# Patient Record
Sex: FEMALE | Race: WHITE | NOT HISPANIC OR LATINO | Employment: OTHER | URBAN - NONMETROPOLITAN AREA
[De-identification: names, ages, dates, MRNs, and addresses within clinical notes are randomized per-mention and may not be internally consistent; named-entity substitution may affect disease eponyms.]

---

## 2017-08-01 ENCOUNTER — OFFICE VISIT (OUTPATIENT)
Dept: FAMILY MEDICINE | Facility: OTHER | Age: 62
End: 2017-08-01
Attending: NURSE PRACTITIONER
Payer: COMMERCIAL

## 2017-08-01 VITALS
HEART RATE: 84 BPM | TEMPERATURE: 98.3 F | OXYGEN SATURATION: 99 % | SYSTOLIC BLOOD PRESSURE: 120 MMHG | WEIGHT: 128 LBS | HEIGHT: 65 IN | RESPIRATION RATE: 18 BRPM | DIASTOLIC BLOOD PRESSURE: 70 MMHG | BODY MASS INDEX: 21.33 KG/M2

## 2017-08-01 DIAGNOSIS — Z76.89 ENCOUNTER TO ESTABLISH CARE: Primary | ICD-10-CM

## 2017-08-01 PROCEDURE — 99213 OFFICE O/P EST LOW 20 MIN: CPT | Performed by: NURSE PRACTITIONER

## 2017-08-01 RX ORDER — VALACYCLOVIR HYDROCHLORIDE 500 MG/1
500 TABLET, FILM COATED ORAL DAILY
Refills: 0 | COMMUNITY
Start: 2017-02-13 | End: 2017-10-10 | Stop reason: SINTOL

## 2017-08-01 ASSESSMENT — ANXIETY QUESTIONNAIRES
1. FEELING NERVOUS, ANXIOUS, OR ON EDGE: NOT AT ALL
2. NOT BEING ABLE TO STOP OR CONTROL WORRYING: NOT AT ALL
6. BECOMING EASILY ANNOYED OR IRRITABLE: NOT AT ALL
7. FEELING AFRAID AS IF SOMETHING AWFUL MIGHT HAPPEN: NOT AT ALL
3. WORRYING TOO MUCH ABOUT DIFFERENT THINGS: NOT AT ALL
5. BEING SO RESTLESS THAT IT IS HARD TO SIT STILL: NOT AT ALL
GAD7 TOTAL SCORE: 0
IF YOU CHECKED OFF ANY PROBLEMS ON THIS QUESTIONNAIRE, HOW DIFFICULT HAVE THESE PROBLEMS MADE IT FOR YOU TO DO YOUR WORK, TAKE CARE OF THINGS AT HOME, OR GET ALONG WITH OTHER PEOPLE: NOT DIFFICULT AT ALL
4. TROUBLE RELAXING: NOT AT ALL

## 2017-08-01 ASSESSMENT — PAIN SCALES - GENERAL: PAINLEVEL: NO PAIN (0)

## 2017-08-01 NOTE — PATIENT INSTRUCTIONS
Psychologists/ counselors  [facility]  [phone]  Dr. Ermias Pete [phone] - for testing of ADD diagnosis  [facility]  [phone]  [facility] [phone]  Terry Leiva  [phone]   [facility]  [phone]  (kids)  [facility] [phone]  (teens)  [facility]      [phone]  [facility] Mental [facility] [phone]    [facility]     [phone]   [facility] counseling [phone]  [facility]  [phone]  Regan Mcmahon [phone]  Divina Caceres [phone]  [facility]     [phone]  [facility] adult family     [phone]  [facility] Psych[facility] Well[facility]     [phone]  [facility]     [phone]  [place]  Anand Lazaro  [phone]  [facility]     [phone]  [facility] Dr. ZORAIDA Wells     [phone]  [facility] Services     [phone]

## 2017-08-01 NOTE — PROGRESS NOTES
SUBJECTIVE:                                                    Anne Marie Nix is a 62 year old female who presents to clinic today for the following health issues:      New Patient/Transfer of Care  History of ADD      Duration: life long dx in 1996 was treated by Dr Prakash in [place] - who has now passed away    Description (location/character/radiation): difficulty focusing    Intensity:  moderate    Accompanying signs and symptoms: difficulty focusing    History (similar episodes/previous evaluation): life lone    Precipitating or alleviating factors: going through a divorce at this time    Therapies tried and outcome: Ritalin in the past - waiting on getting paperwork or a new Diagnosis for counselor    Anne Marie does not see a counselor medication only from             Problem list and histories reviewed & adjusted, as indicated.  Additional history: as documented    Patient Active Problem List   Diagnosis     Multiple respiratory allergies     ACP (advance care planning)     History reviewed. No pertinent surgical history.    Social History   Substance Use Topics     Smoking status: Never Smoker     Smokeless tobacco: Never Used     Alcohol use No     Family History   Problem Relation Age of Onset     Allergies Mother      Breast Cancer Mother      DIABETES Mother      Cardiovascular Paternal Grandfather      Allergies Paternal Grandfather      Other - See Comments Father          Current Outpatient Prescriptions   Medication Sig Dispense Refill     valACYclovir (VALTREX) 500 MG tablet Take 500 mg by mouth daily  0     pseudoePHEDrine (SUDAFED) 30 MG tablet Take 2 tablets by mouth every 4 hours as needed.       Allergies   Allergen Reactions     Amoxicillin Trihydrate Other (See Comments)     Headache  Stomach aches  Augmentin         Clavulanic Acid Potassium Other (See Comments)     Headaches  Stomach aches  Augmentin           Reviewed and updated as needed this visit by clinical staffTobacco   "Allergies  Meds  Med Hx  Surg Hx  Fam Hx  Soc Hx      Reviewed and updated as needed this visit by Provider  Allergies  Meds         ROS:  C: NEGATIVE for fever, chills, change in weight  I: NEGATIVE for worrisome rashes, moles or lesions  E: NEGATIVE for vision changes or irritation  E/M: NEGATIVE for ear, mouth and throat problems POSITIVE: sinus allergies   R: NEGATIVE for significant cough or SOB  CV: NEGATIVE for chest pain, palpitations or peripheral edema  GI: NEGATIVE for nausea, abdominal pain, heartburn, or change in bowel habits  : NEGATIVE for frequency, dysuria, or hematuria  M: NEGATIVE for significant arthralgias or myalgia  N: NEGATIVE for weakness, dizziness or paresthesias  E: NEGATIVE for temperature intolerance, skin/hair changes  H: NEGATIVE for bleeding problems  PSYCHIATRIC: hx of ADD - difficulty focusing - denies depression     OBJECTIVE:     /70 (BP Location: Right arm, Patient Position: Sitting, Cuff Size: Adult Regular)  Pulse 84  Temp 98.3  F (36.8  C) (Tympanic)  Resp 18  Ht 5' 5\" (1.651 m)  Wt 128 lb (58.1 kg)  SpO2 99%  BMI 21.3 kg/m2  Body mass index is 21.3 kg/(m^2).   GENERAL: healthy, alert and no distress  RESP: non-labored  CV: regular rate  PSYCH: mentation appears normal, affect normal/bright    Diagnostic Test Results:  none     ASSESSMENT:       PLAN:   ASSESSMENT / PLAN:  (Z76.89) Encounter to establish care  (primary encounter diagnosis)  Comment:   Plan:  Should have diagnostic testing for ADD   Provided a list of counselors       Should schedule a physical exam        DEEPA [facility] [place]    "

## 2017-08-01 NOTE — MR AVS SNAPSHOT
After Visit Summary   8/1/2017    Anne Marie Nix    MRN: [id number]           Patient Information     Date Of Birth          1955        Visit Information        Provider Department      8/1/2017 9:40 AM Kim Wheat APRN CNP [facility]        Care Instructions    Psychologists/ counselors  [facility]  [phone]  Dr. Ermias Pete [phone] - for testing of ADD diagnosis  [facility]  [phone]  [facility] [phone]  Terry Leiva  [phone]   [email or web address]  [phone]  (kids)  [email or web address] [phone]  (teens)  [facility] Behavioral Health      [phone]  [facility] Health [phone]    [facility]     [phone]   [facility] counseling [phone]  [facility]  [phone]  Regan Mcmahon [phone]  Divina Caceres [phone]  [facility]     [phone]  [facility] behavioral/ adult family     [phone]  [facility] Psych[facility] Health & Wellness     [phone]  [facility]     [phone]  [place]ie Lazaro  [phone]  [facility]     [phone]  [facility] Dr. ZORAIDA Wells     [phone]  [facility]     [phone]                          Follow-ups after your visit        Who to contact     If you have questions or need follow up information about today's clinic visit or your schedule please contact [facility] directly at [phone].  Normal or non-critical lab and imaging results will be communicated to you by MyChart, letter or phone within 4 business days after the clinic has received the results. If you do not hear from us within 7 days, please contact the clinic through MyChart or phone. If you have a critical or abnormal lab result, we will notify you by phone as soon as possible.  Submit refill requests through [employer] or call your pharmacy and  "they will forward the refill request to us. Please allow 3 business days for your refill to be completed.          Additional Information About Your Visit        NLP Logixhart Information     Proximic lets you send messages to your doctor, view your test results, renew your prescriptions, schedule appointments and more. To sign up, go to [email or web address] . Click on \"Log in\" on the left side of the screen, which will take you to the Welcome page. Then click on \"Sign up Now\" on the right side of the page.     You will be asked to enter the access code listed below, as well as some personal information. Please follow the directions to create your username and password.     Your access code is: [email or web address]  Expires: 10/30/2017 11:02 AM     Your access code will  in 90 days. If you need help or a new code, please call your [place] clinic or [phone].        Care EveryWhere ID     This is your [facility] EveryWhere ID. This could be used by other organizations to access your [place] medical records  [id number]        Your Vitals Were     Pulse Temperature Respirations Height Pulse Oximetry BMI (Body Mass Index)    84 98.3  F (36.8  C) (Tympanic) 18 5' 5\" (1.651 m) 99% 21.3 kg/m2       Blood Pressure from Last 3 Encounters:   17 120/70   16 109/72   13 116/76    Weight from Last 3 Encounters:   17 128 lb (58.1 kg)   16 124 lb (56.2 kg)   14 119 lb 6.4 oz (54.2 kg)              Today, you had the following     No orders found for display         Today's Medication Changes          These changes are accurate as of: 17 11:02 AM.  If you have any questions, ask your nurse or doctor.               Stop taking these medicines if you haven't already. Please contact your care team if you have questions.     clindamycin 1 % lotion   Commonly known as:  CLEOCIN T   Stopped by:  Kim Wheat APRN CNP                    Primary Care Provider    No Pcp Confirmed       No " address on file        Equal Access to Services     [facility] : Hadii aad gabbi nesha Lang, wadestinee lucezar, salvador gladisandresymone giraldo, waxjarrett john liebermansindyrolo damian. So [facility] [phone].    ATENCIÓN: Si habla español, tiene a mcconnell disposición servicios gratuitos de asistencia lingüística. Llame al [phone].    We comply with applicable federal civil rights laws and [place] laws. We do not discriminate on the basis of race, color, national origin, age, disability sex, sexual orientation or gender identity.            Thank you!     Thank you for choosing [facility]  for your care. Our goal is always to provide you with excellent care. Hearing back from our patients is one way we can continue to improve our services. Please take a few minutes to complete the written survey that you may receive in the mail after your visit with us. Thank you!             Your Updated Medication List - Protect others around you: Learn how to safely use, store and throw away your medicines at www.disposemymeds.org.          This list is accurate as of: 8/1/17 11:02 AM.  Always use your most recent med list.                   Brand Name Dispense Instructions for use Diagnosis    SUDAFED 30 MG tablet   Generic drug:  pseudoePHEDrine      Take 2 tablets by mouth every 4 hours as needed.        valACYclovir 500 MG tablet    VALTREX     Take 500 mg by mouth daily

## 2017-08-02 ASSESSMENT — ANXIETY QUESTIONNAIRES: GAD7 TOTAL SCORE: 0

## 2017-08-02 ASSESSMENT — PATIENT HEALTH QUESTIONNAIRE - PHQ9: SUM OF ALL RESPONSES TO PHQ QUESTIONS 1-9: 5

## 2017-08-23 ENCOUNTER — TRANSFERRED RECORDS (OUTPATIENT)
Dept: HEALTH INFORMATION MANAGEMENT | Facility: HOSPITAL | Age: 62
End: 2017-08-23

## 2017-09-12 ENCOUNTER — OFFICE VISIT (OUTPATIENT)
Dept: FAMILY MEDICINE | Facility: OTHER | Age: 62
End: 2017-09-12
Attending: NURSE PRACTITIONER
Payer: COMMERCIAL

## 2017-09-12 ENCOUNTER — TELEPHONE (OUTPATIENT)
Dept: FAMILY MEDICINE | Facility: OTHER | Age: 62
End: 2017-09-12

## 2017-09-12 VITALS
RESPIRATION RATE: 18 BRPM | OXYGEN SATURATION: 99 % | DIASTOLIC BLOOD PRESSURE: 70 MMHG | SYSTOLIC BLOOD PRESSURE: 128 MMHG | HEART RATE: 95 BPM | HEIGHT: 65 IN | BODY MASS INDEX: 21.33 KG/M2 | WEIGHT: 128 LBS | TEMPERATURE: 98.4 F

## 2017-09-12 DIAGNOSIS — F90.0 ATTENTION DEFICIT HYPERACTIVITY DISORDER (ADHD), PREDOMINANTLY INATTENTIVE TYPE: Primary | ICD-10-CM

## 2017-09-12 PROCEDURE — 99213 OFFICE O/P EST LOW 20 MIN: CPT | Performed by: NURSE PRACTITIONER

## 2017-09-12 RX ORDER — LISDEXAMFETAMINE DIMESYLATE 30 MG/1
30 CAPSULE ORAL EVERY MORNING
Qty: 30 CAPSULE | Refills: 0 | Status: SHIPPED | OUTPATIENT
Start: 2017-09-12 | End: 2017-09-19 | Stop reason: ALTCHOICE

## 2017-09-12 RX ORDER — VALACYCLOVIR HYDROCHLORIDE 500 MG/1
500 TABLET, FILM COATED ORAL DAILY
Qty: 14 TABLET | Refills: 0 | Status: CANCELLED | OUTPATIENT
Start: 2017-09-12

## 2017-09-12 ASSESSMENT — PAIN SCALES - GENERAL: PAINLEVEL: NO PAIN (0)

## 2017-09-12 NOTE — PROGRESS NOTES
SUBJECTIVE:   Anne Marie Nix is a 62 year old female who presents to clinic today for the following health issues:        ADHD      Duration: lifetime    Description (location/character/radiation): has received letter for ADHD recommendations, copies to chart    Intensity:  moderate    Accompanying signs and symptoms: hard to concentrate    History (similar episodes/previous evaluation): None    Precipitating or alleviating factors: None    Therapies tried and outcome: medications prior to this worked well.  Has been off medications for over 2 years Ritalin 10mg q4hr prn             Problem list and histories reviewed & adjusted, as indicated.  Additional history: as documented    Patient Active Problem List   Diagnosis     Multiple respiratory allergies     ACP (advance care planning)     Attention deficit hyperactivity disorder (ADHD), predominantly inattentive type     History reviewed. No pertinent surgical history.    Social History   Substance Use Topics     Smoking status: Never Smoker     Smokeless tobacco: Never Used     Alcohol use No     Family History   Problem Relation Age of Onset     Allergies Mother      Breast Cancer Mother      DIABETES Mother      Cardiovascular Paternal Grandfather      Allergies Paternal Grandfather      Other - See Comments Father          Current Outpatient Prescriptions   Medication Sig Dispense Refill     valACYclovir (VALTREX) 500 MG tablet Take 500 mg by mouth daily  0     pseudoePHEDrine (SUDAFED) 30 MG tablet Take 2 tablets by mouth every 4 hours as needed.       Allergies   Allergen Reactions     Amoxicillin Trihydrate Other (See Comments)     Headache  Stomach aches  Augmentin         Clavulanic Acid Potassium Other (See Comments)     Headaches  Stomach aches  Augmentin           Reviewed and updated as needed this visit by clinical staffTobacco  Allergies  Meds  Med Hx  Surg Hx  Fam Hx  Soc Hx      Reviewed and updated as needed this visit by Provider      "    ROS:  C: NEGATIVE for fever, chills, change in weight  E/M: NEGATIVE for ear, mouth and throat problems  R: NEGATIVE for significant cough or SOB  CV: NEGATIVE for chest pain, palpitations or peripheral edema  PSYCHIATRIC: Hx of ADHD    OBJECTIVE:     /70 (BP Location: Right arm, Patient Position: Sitting, Cuff Size: Adult Regular)  Pulse 95  Temp 98.4  F (36.9  C) (Tympanic)  Resp 18  Ht 5' 5\" (1.651 m)  Wt 128 lb (58.1 kg)  SpO2 99%  BMI 21.3 kg/m2  Body mass index is 21.3 kg/(m^2).   GENERAL: healthy, alert and no distress  RESP: non-labored breathing  CV: regular rate  PSYCH: mentation appears normal, anxious and appearance well groomed    Diagnostic Test Results:  none     ASSESSMENT:       PLAN:   ASSESSMENT / PLAN:  (F90.0) Attention deficit hyperactivity disorder (ADHD), predominantly inattentive type  (primary encounter diagnosis)  Comment:  Discussed plan of care with Anne Marie. She stated she used to be on Ritalin 10 every 4 hours.  Discussed starting her back on the Ritalin 10 mg today and following up in 3 weeks to increase if needed. She declined stating she has already scheduled an appointment with another provider in the [place] area where she lives. She states she does not have time to keep coming back and forth. Anne Marie was adamant that she was not drug seeking or would she abuse medication. She did not agree with the plan to start out slow. She stated she will follow up with another provider.  Copy of Luan Pete's assessment for ADHD to be scanned into the chart.    Anne Marie called back and would like to try Vyvanse for her ADHD. She understands she will need to follow up in 3 weeks. Will provide one month of medication and re-evaluate at 3 weeks for continued treatment or increase as needed per symptoms management for ADHD.    Anne Marie also wanted her valtrex reordered today, but when questioning her about her why she became defensive. Stating she has a history of cold sores. When going " "to re-order she stated \"do not worry about it my son-in-law is a dentist and he has been filling my valtrex and he can continue to fill it for me.\"          Kim Wheat, DEEPA [facility] [place]  "

## 2017-09-12 NOTE — MR AVS SNAPSHOT
"              After Visit Summary   2017    Anne Marie Nix    MRN: [id number]           Patient Information     Date Of Birth          1955        Visit Information        Provider Department      2017 11:40 AM Kim Wheat APRN CNP [facility]        Today's Diagnoses     Attention deficit hyperactivity disorder (ADHD), predominantly inattentive type    -  1       Follow-ups after your visit        Follow-up notes from your care team     Return in about 3 weeks (around 10/3/2017) for ADHD MED RECHECK (short).      Who to contact     If you have questions or need follow up information about today's clinic visit or your schedule please contact [facility] directly at [phone].  Normal or non-critical lab and imaging results will be communicated to you by Easy Home Solutionshart, letter or phone within 4 business days after the clinic has received the results. If you do not hear from us within 7 days, please contact the clinic through [employer] or phone. If you have a critical or abnormal lab result, we will notify you by phone as soon as possible.  Submit refill requests through BuyWithMe or call your pharmacy and they will forward the refill request to us. Please allow 3 business days for your refill to be completed.          Additional Information About Your Visit        Easy Home Solutionshart Information     BuyWithMe lets you send messages to your doctor, view your test results, renew your prescriptions, schedule appointments and more. To sign up, go to [email or web address] . Click on \"Log in\" on the left side of the screen, which will take you to the Welcome page. Then click on \"Sign up Now\" on the right side of the page.     You will be asked to enter the access code listed below, as well as some personal information. Please follow the directions to create your username and password.     Your access code is: [email or web address]  Expires: 10/30/2017 11:02 AM     Your access code will  in " "90 days. If you need help or a new code, please call your [place] clinic or [phone].        Care EveryWhere ID     This is your Care EveryWhere ID. This could be used by other organizations to access your [place] medical records  [id number]        Your Vitals Were     Pulse Temperature Respirations Height Pulse Oximetry BMI (Body Mass Index)    95 98.4  F (36.9  C) (Tympanic) 18 5' 5\" (1.651 m) 99% 21.3 kg/m2       Blood Pressure from Last 3 Encounters:   09/12/17 128/70   08/01/17 120/70   07/08/16 109/72    Weight from Last 3 Encounters:   09/12/17 128 lb (58.1 kg)   08/01/17 128 lb (58.1 kg)   07/08/16 124 lb (56.2 kg)              Today, you had the following     No orders found for display       Primary Care Provider Office Phone # Fax #    Kim DEEPA [facility] [phone]       [address] [facility] [address]        Equal Access to Services     [facility]: Hadii yan ku hadasho Soadina, waaxda luqadaha, qaybta kaalmada kristal, leah jackson . So [facility] [phone].    ATENCIÓN: Si habla español, tiene a mcconnell disposición servicios gratuitos de asistencia lingüística. Fede al [phone].    We comply with applicable federal civil rights laws and [place] laws. We do not discriminate on the basis of race, color, national origin, age, disability sex, sexual orientation or gender identity.            Thank you!     Thank you for choosing [facility]  for your care. Our goal is always to provide you with excellent care. Hearing back from our patients is one way we can continue to improve our services. Please take a few minutes to complete the written survey that you may receive in the mail after your visit with us. Thank you!             Your Updated Medication List - Protect others around you: Learn how to safely use, store and throw away your medicines at www.disposemymeds.org.          This list is accurate as of: 9/12/17 12:52 " PM.  Always use your most recent med list.                   Brand Name Dispense Instructions for use Diagnosis    SUDAFED 30 MG tablet   Generic drug:  pseudoePHEDrine      Take 2 tablets by mouth every 4 hours as needed.        valACYclovir 500 MG tablet    VALTREX     Take 500 mg by mouth daily

## 2017-09-12 NOTE — NURSING NOTE
"Chief Complaint   Patient presents with     RECHECK     post Psychological Assessment ADHD       Initial /70 (BP Location: Right arm, Patient Position: Sitting, Cuff Size: Adult Regular)  Pulse 95  Temp 98.4  F (36.9  C) (Tympanic)  Resp 18  Ht 5' 5\" (1.651 m)  Wt 128 lb (58.1 kg)  SpO2 99%  BMI 21.3 kg/m2 Estimated body mass index is 21.3 kg/(m^2) as calculated from the following:    Height as of this encounter: 5' 5\" (1.651 m).    Weight as of this encounter: 128 lb (58.1 kg).  Medication Reconciliation: complete   Nataliia Wilson      "

## 2017-09-12 NOTE — TELEPHONE ENCOUNTER
Order done.  Please notify she needs to follow up in 3 weeks or sooner if problems    Thank you  Kim ARENAS FNP-BC  Family Nurse Practitioner

## 2017-09-14 ENCOUNTER — TELEPHONE (OUTPATIENT)
Dept: FAMILY MEDICINE | Facility: OTHER | Age: 62
End: 2017-09-14

## 2017-09-18 ENCOUNTER — TELEPHONE (OUTPATIENT)
Dept: FAMILY MEDICINE | Facility: OTHER | Age: 62
End: 2017-09-18
Payer: COMMERCIAL

## 2017-09-18 NOTE — TELEPHONE ENCOUNTER
7:33 AM    Reason for Call: Phone Call    Description: Pt called and stated she had some adverse effects to the Vyvanse. Stated she had a lot of nausea and loss of appetite. She stopped taking med for last 2 days and nausea has improved. Please call her back at [phone]    Was an appointment offered for this call? Yes  If yes : Appointment type              Date    Preferred method for responding to this message: Telephone Call  What is your phone number ?    If we cannot reach you directly, may we leave a detailed response at the number you provided? Yes    Can this message wait until your PCP/provider returns, if available today? Not applicable    Aspen Barahona

## 2017-09-19 ENCOUNTER — TELEPHONE (OUTPATIENT)
Dept: FAMILY MEDICINE | Facility: OTHER | Age: 62
End: 2017-09-19

## 2017-09-19 DIAGNOSIS — F90.0 ATTENTION DEFICIT HYPERACTIVITY DISORDER (ADHD), PREDOMINANTLY INATTENTIVE TYPE: Primary | ICD-10-CM

## 2017-09-19 RX ORDER — METHYLPHENIDATE HYDROCHLORIDE 10 MG/1
10 TABLET ORAL 2 TIMES DAILY
Qty: 60 TABLET | Refills: 0 | Status: CANCELLED | OUTPATIENT
Start: 2017-09-19

## 2017-09-19 RX ORDER — METHYLPHENIDATE HYDROCHLORIDE 10 MG/1
10 TABLET ORAL 2 TIMES DAILY
Qty: 60 TABLET | Refills: 0 | Status: SHIPPED | OUTPATIENT
Start: 2017-09-19 | End: 2017-10-10

## 2017-09-19 NOTE — TELEPHONE ENCOUNTER
11:56 AM    Reason for Call: Phone Call    Description: Pt calling and stating that she needs a new medication due to fact gerardo she was having side effects from the vyvanse. Please call her back. She also stated she called about this 09/18/17.    Was an appointment offered for this call? No  If yes : Appointment type              Date    Preferred method for responding to this message: Telephone Call  What is your phone number ?    If we cannot reach you directly, may we leave a detailed response at the number you provided? Yes    Can this message wait until your PCP/provider returns, if available today?     Vanessa Sauceda

## 2017-09-19 NOTE — TELEPHONE ENCOUNTER
Spoke wit patient and she would like to restart the Ritalin 10mg BID.  Please send to Karthikey[facility].  Nataliia Wilson

## 2017-09-19 NOTE — PROGRESS NOTES
Dc vyvanse due to side effect.  Will try Ritalin 10 mg BID for now. 1 month supply ordered. Anne Marie should follow up in about 3 weeks for evaluation.

## 2017-09-19 NOTE — TELEPHONE ENCOUNTER
Stimulants for ADHD do cause a loss of appetite.  She was on Ritalin in the past did she have any problems with this?  Would could start her on 10 mg twice a day if she would like to restart that.    Thank you  Kim ARENAS P-BC  Family Nurse Practitioner

## 2017-09-20 ENCOUNTER — TELEPHONE (OUTPATIENT)
Dept: FAMILY MEDICINE | Facility: OTHER | Age: 62
End: 2017-09-20

## 2017-10-10 ENCOUNTER — OFFICE VISIT (OUTPATIENT)
Dept: FAMILY MEDICINE | Facility: OTHER | Age: 62
End: 2017-10-10
Attending: NURSE PRACTITIONER
Payer: COMMERCIAL

## 2017-10-10 VITALS
WEIGHT: 128 LBS | HEART RATE: 98 BPM | OXYGEN SATURATION: 98 % | DIASTOLIC BLOOD PRESSURE: 72 MMHG | HEIGHT: 65 IN | SYSTOLIC BLOOD PRESSURE: 126 MMHG | BODY MASS INDEX: 21.33 KG/M2 | RESPIRATION RATE: 18 BRPM | TEMPERATURE: 97.8 F

## 2017-10-10 DIAGNOSIS — F90.0 ATTENTION DEFICIT HYPERACTIVITY DISORDER (ADHD), PREDOMINANTLY INATTENTIVE TYPE: ICD-10-CM

## 2017-10-10 PROCEDURE — 99213 OFFICE O/P EST LOW 20 MIN: CPT | Performed by: NURSE PRACTITIONER

## 2017-10-10 RX ORDER — LISDEXAMFETAMINE DIMESYLATE 30 MG/1
CAPSULE ORAL
Refills: 0 | COMMUNITY
Start: 2017-09-14 | End: 2017-10-10 | Stop reason: ALTCHOICE

## 2017-10-10 RX ORDER — METHYLPHENIDATE HYDROCHLORIDE 10 MG/1
10 TABLET ORAL 3 TIMES DAILY
Qty: 90 TABLET | Refills: 0 | Status: SHIPPED | OUTPATIENT
Start: 2017-10-10 | End: 2017-11-14

## 2017-10-10 ASSESSMENT — ANXIETY QUESTIONNAIRES
7. FEELING AFRAID AS IF SOMETHING AWFUL MIGHT HAPPEN: NOT AT ALL
4. TROUBLE RELAXING: NOT AT ALL
GAD7 TOTAL SCORE: 0
5. BEING SO RESTLESS THAT IT IS HARD TO SIT STILL: NOT AT ALL
1. FEELING NERVOUS, ANXIOUS, OR ON EDGE: NOT AT ALL
2. NOT BEING ABLE TO STOP OR CONTROL WORRYING: NOT AT ALL
3. WORRYING TOO MUCH ABOUT DIFFERENT THINGS: NOT AT ALL
IF YOU CHECKED OFF ANY PROBLEMS ON THIS QUESTIONNAIRE, HOW DIFFICULT HAVE THESE PROBLEMS MADE IT FOR YOU TO DO YOUR WORK, TAKE CARE OF THINGS AT HOME, OR GET ALONG WITH OTHER PEOPLE: NOT DIFFICULT AT ALL
6. BECOMING EASILY ANNOYED OR IRRITABLE: NOT AT ALL

## 2017-10-10 ASSESSMENT — PATIENT HEALTH QUESTIONNAIRE - PHQ9: SUM OF ALL RESPONSES TO PHQ QUESTIONS 1-9: 0

## 2017-10-10 ASSESSMENT — PAIN SCALES - GENERAL: PAINLEVEL: NO PAIN (0)

## 2017-10-10 NOTE — MR AVS SNAPSHOT
After Visit Summary   10/10/2017    Anne Marie Nix    MRN: [id number]           Patient Information     Date Of Birth          1955        Visit Information        Provider Department      10/10/2017 9:20 AM Kim Wheat APRN [facility] [place]        Today's Diagnoses     Attention deficit hyperactivity disorder (ADHD), predominantly inattentive type          Care Instructions      Attention Deficit/Hyperactivity Disorder (ADHD, ADD) in Adults  You ve always had trouble concentrating. Your mind wanders, and it s hard to finish tasks. As a result, you didn t do well in school. And now, you often struggle with your job. Sometimes this makes you goldstein or depressed. These may be symptoms of attention deficit  hyperactivity disorder (ADHD) or may still be referred to as attention deficit disorder (ADD). To find out more, talk to your healthcare provider. He or she can offer guidance and support.  Symptoms  of ADD in adults  For an adult to be diagnosed with ADHD, the symptoms must have been present since childhood. The symptoms may include:    Trouble thinking things through    Low self-esteem    Depression    Trouble holding a job    Memory problems    Problems with a marriage or relationship    Lack of discipline   What is ADHD?  Attention deficit hyperactivity disorder makes it hard to focus your mind. You may daydream a lot. And you may be restless much of the time. As a result, you may have trouble with detailed or boring work. And it may be hard to stick with one project for very long. You also may forget things. Or, you may miss key points during a lecture or meeting. You may even have trouble sitting through a movie or concert. At times, you may feel frustrated or angry. This can affect your relationships with others.  Who does it affect?  ADHD starts in childhood. Sometimes, your symptoms may improve as you get older. But they also may persist into your adult years.  ADHD is often thought of as a  kid s problem.  That s why it s often missed in adults. In fact, many parents learn they have ADHD when their children are diagnosed.  What causes it?  The exact cause of ADHD isn t known. The disorder does run in families. Having one parent with ADHD makes it more likely you ll have it too. And the part of your brain that controls attention may be involved. Certain brain chemicals that are out of balance may also play a role.  What can be done?  The first step is finding out if you really have ADHD. Your doctor will use special guidelines to diagnose the disorder. Most adults with ADHD are greatly helped by therapy and coaching. In some cases, your doctor may also prescribe medicine to ease your symptoms.  Resources    National Resource Center on AD/HDwww.maqr9zjnp.org    Attention Deficit Disorder Associationwww.add.org   Date Last Reviewed: 1/1/2017 2000-2017 [employer]. [address] [place] [address]. All rights reserved. This information is not intended as a substitute for professional medical care. Always follow your healthcare professional's instructions.                Follow-ups after your visit        Who to contact     If you have questions or need follow up information about today's clinic visit or your schedule please contact [facility] directly at [phone].  Normal or non-critical lab and imaging results will be communicated to you by MyChart, letter or phone within 4 business days after the clinic has received the results. If you do not hear from us within 7 days, please contact the clinic through MyChart or phone. If you have a critical or abnormal lab result, we will notify you by phone as soon as possible.  Submit refill requests through [employer] or call your pharmacy and they will forward the refill request to us. Please allow 3 business days for your refill to be completed.          Additional Information About Your  "Visit        [employer] Information     [employer] lets you send messages to your doctor, view your test results, renew your prescriptions, schedule appointments and more. To sign up, go to [email or web address] . Click on \"Log in\" on the left side of the screen, which will take you to the Welcome page. Then click on \"Sign up Now\" on the right side of the page.     You will be asked to enter the access code listed below, as well as some personal information. Please follow the directions to create your username and password.     Your access code is: [email or web address]  Expires: 10/30/2017 11:02 AM     Your access code will  in 90 days. If you need help or a new code, please call your [facility] clinic or [phone].        Care EveryWhere ID     This is your Care EveryWhere ID. This could be used by other organizations to access your [facility] medical records  [id number]        Your Vitals Were     Pulse Temperature Respirations Height Pulse Oximetry BMI (Body Mass Index)    98 97.8  F (36.6  C) (Tympanic) 18 5' 5\" (1.651 m) 98% 21.3 kg/m2       Blood Pressure from Last 3 Encounters:   10/10/17 126/72   17 128/70   17 120/70    Weight from Last 3 Encounters:   10/10/17 128 lb (58.1 kg)   17 128 lb (58.1 kg)   17 128 lb (58.1 kg)              Today, you had the following     No orders found for display         Today's Medication Changes          These changes are accurate as of: 10/10/17  9:49 AM.  If you have any questions, ask your nurse or doctor.               These medicines have changed or have updated prescriptions.        Dose/Directions    methylphenidate 10 MG tablet   Commonly known as:  RITALIN   This may have changed:  when to take this   Used for:  Attention deficit hyperactivity disorder (ADHD), predominantly inattentive type   Changed by:  Kim Wheat APRN CNP        Dose:  10 mg   Take 1 tablet (10 mg) by mouth 3 times daily   Quantity:  90 tablet   Refills:  0       "   Stop taking these medicines if you haven't already. Please contact your care team if you have questions.     valACYclovir 500 MG tablet   Commonly known as:  VALTREX   Stopped by:  Kim Wheat APRN CNP           VYVANSE 30 MG capsule   Generic drug:  lisdexamfetamine   Stopped by:  Kim Wheat APRN CNP                Where to get your medicines      Some of these will need a paper prescription and others can be bought over the counter.  Ask your nurse if you have questions.     Bring a paper prescription for each of these medications     methylphenidate 10 MG tablet                Primary Care Provider Office Phone # Fax #    DEEPA Cobb [phone] [address]  [facility] [address]        Equal Access to Services     LESTER ALTMAN : Maria Alejandra blairo Soadina, waaxda luqadaha, qaybta kaalmada adeegyada, leah jackson . So [facility] [phone].    ATENCIÓN: Si habla español, tiene a mcconnell disposición servicios gratuitos de asistencia lingüística. Llame al [phone].    We comply with applicable federal civil rights laws and [place] laws. We do not discriminate on the basis of race, color, national origin, age, disability, sex, sexual orientation, or gender identity.            Thank you!     Thank you for choosing [facility]  for your care. Our goal is always to provide you with excellent care. Hearing back from our patients is one way we can continue to improve our services. Please take a few minutes to complete the written survey that you may receive in the mail after your visit with us. Thank you!             Your Updated Medication List - Protect others around you: Learn how to safely use, store and throw away your medicines at www.disposemymeds.org.          This list is accurate as of: 10/10/17  9:49 AM.  Always use your most recent med list.                   Brand Name Dispense Instructions for  use Diagnosis    methylphenidate 10 MG tablet    RITALIN    90 tablet    Take 1 tablet (10 mg) by mouth 3 times daily    Attention deficit hyperactivity disorder (ADHD), predominantly inattentive type       SUDAFED 30 MG tablet   Generic drug:  pseudoePHEDrine      Take 2 tablets by mouth every 4 hours as needed.

## 2017-10-10 NOTE — PATIENT INSTRUCTIONS
Attention Deficit/Hyperactivity Disorder (ADHD, ADD) in Adults  You ve always had trouble concentrating. Your mind wanders, and it s hard to finish tasks. As a result, you didn t do well in school. And now, you often struggle with your job. Sometimes this makes you goldstein or depressed. These may be symptoms of attention deficit  hyperactivity disorder (ADHD) or may still be referred to as attention deficit disorder (ADD). To find out more, talk to your healthcare provider. He or she can offer guidance and support.  Symptoms  of ADD in adults  For an adult to be diagnosed with ADHD, the symptoms must have been present since childhood. The symptoms may include:    Trouble thinking things through    Low self-esteem    Depression    Trouble holding a job    Memory problems    Problems with a marriage or relationship    Lack of discipline   What is ADHD?  Attention deficit hyperactivity disorder makes it hard to focus your mind. You may daydream a lot. And you may be restless much of the time. As a result, you may have trouble with detailed or boring work. And it may be hard to stick with one project for very long. You also may forget things. Or, you may miss key points during a lecture or meeting. You may even have trouble sitting through a movie or concert. At times, you may feel frustrated or angry. This can affect your relationships with others.  Who does it affect?  ADHD starts in childhood. Sometimes, your symptoms may improve as you get older. But they also may persist into your adult years. ADHD is often thought of as a  kid s problem.  That s why it s often missed in adults. In fact, many parents learn they have ADHD when their children are diagnosed.  What causes it?  The exact cause of ADHD isn t known. The disorder does run in families. Having one parent with ADHD makes it more likely you ll have it too. And the part of your brain that controls attention may be involved. Certain brain chemicals that are out  of balance may also play a role.  What can be done?  The first step is finding out if you really have ADHD. Your doctor will use special guidelines to diagnose the disorder. Most adults with ADHD are greatly helped by therapy and coaching. In some cases, your doctor may also prescribe medicine to ease your symptoms.  Resources    National Resource Center on AD/HDwww.prte9hipb.org    Attention Deficit Disorder Associationwww.add.org   Date Last Reviewed: 1/1/2017 2000-2017 [employer]. [address] [place] [address], [place] [address]. All rights reserved. This information is not intended as a substitute for professional medical care. Always follow your healthcare professional's instructions.

## 2017-10-10 NOTE — PROGRESS NOTES
SUBJECTIVE:   Anne Marie Nix is a 62 year old female who presents to clinic today for the following health issues:      ADHD      Duration: doing well on current medications does not last throughout the entire day    Description (location/character/radiation):     Intensity:  moderate    Accompanying signs and symptoms: higher stress    History (similar episodes/previous evaluation): yes previously treated    Precipitating or alleviating factors: None    Therapies tried and outcome: ritalin     At this time Anne Marie continues to see Efra Pete for counseling.               Problem list and histories reviewed & adjusted, as indicated.  Additional history: as documented    Patient Active Problem List   Diagnosis     Multiple respiratory allergies     ACP (advance care planning)     Attention deficit hyperactivity disorder (ADHD), predominantly inattentive type     History reviewed. No pertinent surgical history.    Social History   Substance Use Topics     Smoking status: Never Smoker     Smokeless tobacco: Never Used     Alcohol use No     Family History   Problem Relation Age of Onset     Allergies Mother      Breast Cancer Mother      DIABETES Mother      Cardiovascular Paternal Grandfather      Allergies Paternal Grandfather      Other - See Comments Father          Current Outpatient Prescriptions   Medication Sig Dispense Refill     methylphenidate (RITALIN) 10 MG tablet Take 1 tablet (10 mg) by mouth 2 times daily 60 tablet 0     pseudoePHEDrine (SUDAFED) 30 MG tablet Take 2 tablets by mouth every 4 hours as needed.       Allergies   Allergen Reactions     Amoxicillin Trihydrate Other (See Comments)     Headache  Stomach aches  Augmentin         Clavulanic Acid Potassium Other (See Comments)     Headaches  Stomach aches  Augmentin           Reviewed and updated as needed this visit by clinical staffTobacco  Allergies  Meds  Med Hx  Surg Hx  Fam Hx  Soc Hx      Reviewed and updated as needed this visit  "by Provider         ROS:  C: NEGATIVE for fever, chills, change in weight  ENT/MOUTH: sinus congestion - allergies  R: NEGATIVE for significant cough or SOB  CV: NEGATIVE for chest pain, palpitations or peripheral edema  PSYCHIATRIC: ADHD    OBJECTIVE:     /72 (BP Location: Left arm, Patient Position: Sitting, Cuff Size: Adult Regular)  Pulse 98  Temp 97.8  F (36.6  C) (Tympanic)  Resp 18  Ht 5' 5\" (1.651 m)  Wt 128 lb (58.1 kg)  SpO2 98%  BMI 21.3 kg/m2  Body mass index is 21.3 kg/(m^2).   GENERAL: healthy, alert and no distress  RESP: lungs clear to auscultation - no rales, rhonchi or wheezes  CV: regular rate and rhythm, normal S1 S2, no S3 or S4, no murmur, click or rub, no peripheral edema and peripheral pulses strong  PSYCH: mentation appears normal and anxious    Diagnostic Test Results:  none     ASSESSMENT:       PLAN:   ASSESSMENT / PLAN:  (F90.0) Attention deficit hyperactivity disorder (ADHD), predominantly inattentive type  Comment: Ritalin can take up to 3 tables daily if needed. Next visit possible urine drug screen. Follow up in 3 months  Plan: methylphenidate (RITALIN) 10 MG tablet              See Patient Instructions    DEEPA [facility] [place]  "

## 2017-10-10 NOTE — NURSING NOTE
"Chief Complaint   Patient presents with     A.D.H.D     fu       Initial /72 (BP Location: Left arm, Patient Position: Sitting, Cuff Size: Adult Regular)  Pulse 98  Temp 97.8  F (36.6  C) (Tympanic)  Resp 18  Ht 5' 5\" (1.651 m)  Wt 128 lb (58.1 kg)  SpO2 98%  BMI 21.3 kg/m2 Estimated body mass index is 21.3 kg/(m^2) as calculated from the following:    Height as of this encounter: 5' 5\" (1.651 m).    Weight as of this encounter: 128 lb (58.1 kg).  Medication Reconciliation: complete   Nataliia Wilson      "

## 2017-10-11 ASSESSMENT — ANXIETY QUESTIONNAIRES: GAD7 TOTAL SCORE: 0

## 2017-10-18 ENCOUNTER — TELEPHONE (OUTPATIENT)
Dept: FAMILY MEDICINE | Facility: OTHER | Age: 62
End: 2017-10-18

## 2017-10-18 NOTE — TELEPHONE ENCOUNTER
11:05 AM    Reason for Call: Phone Call    Description: Pt called and states that she would like a call back regarding her methylphenidate (RITALIN) 10 MG tablet that she is on.    Was an appointment offered for this call? No  If yes : Appointment type              Date    Preferred method for responding to this message: Telephone Call  What is your phone number ?    If we cannot reach you directly, may we leave a detailed response at the number you provided? Yes    Can this message wait until your PCP/provider returns, if available today? Not applicable,     Janis Live

## 2017-11-13 ENCOUNTER — TELEPHONE (OUTPATIENT)
Dept: FAMILY MEDICINE | Facility: OTHER | Age: 62
End: 2017-11-13

## 2017-11-13 NOTE — TELEPHONE ENCOUNTER
Reason for call:  Medication    1. Medication Name? Ritalin  2. Is this request for a refill? Yes  3. What Pharmacy do you use? [facility]  4. Have you contacted your pharmacy? No    5. If yes, when?  (Please note that the turn-around-time for prescriptions is 72 business hours; I am sending your request at this time. SEND TO  [facility]  )  Description: Patient states that the Rx has been changed and would like to clarify with the nurse so the correct Rx gets refilled  Was an appointment offered for this a call? No   Preferred method for responding to this messageTelephone Call [phone]  If we cannot reach you directly, may we leave a detailed response at the number you provided? Yes  Can this message wait until your PCP/Provider returns if not available today? Yes

## 2017-11-13 NOTE — TELEPHONE ENCOUNTER
Spoke with patient and she didn't realize she was given Rx at her visit 10-10-17.  Will look through her papers for it.  Rx 10mg one up to [facility].  #90.  Nataliia Wilson

## 2017-11-14 DIAGNOSIS — F90.0 ATTENTION DEFICIT HYPERACTIVITY DISORDER (ADHD), PREDOMINANTLY INATTENTIVE TYPE: ICD-10-CM

## 2017-11-14 RX ORDER — METHYLPHENIDATE HYDROCHLORIDE 10 MG/1
10 TABLET ORAL 3 TIMES DAILY
Qty: 90 TABLET | Refills: 0 | Status: SHIPPED | OUTPATIENT
Start: 2017-11-14 | End: 2017-12-15

## 2017-11-16 NOTE — TELEPHONE ENCOUNTER
Pt notified that the written RX is ready at the [facility]  registration to be picked up.

## 2017-11-26 ENCOUNTER — HEALTH MAINTENANCE LETTER (OUTPATIENT)
Age: 62
End: 2017-11-26

## 2017-12-15 DIAGNOSIS — F90.0 ATTENTION DEFICIT HYPERACTIVITY DISORDER (ADHD), PREDOMINANTLY INATTENTIVE TYPE: ICD-10-CM

## 2017-12-15 RX ORDER — METHYLPHENIDATE HYDROCHLORIDE 10 MG/1
10 TABLET ORAL 3 TIMES DAILY
Qty: 90 TABLET | Refills: 0 | Status: SHIPPED | OUTPATIENT
Start: 2017-12-15 | End: 2018-01-12

## 2017-12-15 NOTE — TELEPHONE ENCOUNTER
methylphenidate      Last Written Prescription Date: 11/14/17  Last Fill Quantity: 90,  # refills: 0   Last Office Visit with [facility], [facility] or [facility] prescribing provider: 10/10/17

## 2018-01-12 DIAGNOSIS — F90.0 ATTENTION DEFICIT HYPERACTIVITY DISORDER (ADHD), PREDOMINANTLY INATTENTIVE TYPE: ICD-10-CM

## 2018-01-12 RX ORDER — METHYLPHENIDATE HYDROCHLORIDE 10 MG/1
10 TABLET ORAL 3 TIMES DAILY
Qty: 90 TABLET | Refills: 0 | Status: SHIPPED | OUTPATIENT
Start: 2018-01-12 | End: 2018-02-16

## 2018-01-12 NOTE — TELEPHONE ENCOUNTER
Please encourage an appointment before next refill    Thank you  Kim ARENAS FNP-BC  Family Nurse Practitioner

## 2018-01-12 NOTE — TELEPHONE ENCOUNTER
methylphenidate      Last Written Prescription Date:  12/15/17  Last Fill Quantity: 90,   # refills: 0  Last Office Visit: 10/10/17  Future Office visit:       Routing refill request to provider for review/approval because:  Drug not on the [facility], [facility] or [facility] refill protocol or controlled substance

## 2018-01-17 NOTE — TELEPHONE ENCOUNTER
Left message that the written RX is ready at the [facility]  registration to be picked up.

## 2018-02-16 ENCOUNTER — TELEPHONE (OUTPATIENT)
Dept: FAMILY MEDICINE | Facility: OTHER | Age: 63
End: 2018-02-16

## 2018-02-16 DIAGNOSIS — F90.0 ATTENTION DEFICIT HYPERACTIVITY DISORDER (ADHD), PREDOMINANTLY INATTENTIVE TYPE: ICD-10-CM

## 2018-02-16 RX ORDER — METHYLPHENIDATE HYDROCHLORIDE 10 MG/1
10 TABLET ORAL 3 TIMES DAILY
Qty: 45 TABLET | Refills: 0 | Status: SHIPPED | OUTPATIENT
Start: 2018-02-16 | End: 2018-03-16

## 2018-02-19 NOTE — TELEPHONE ENCOUNTER
LM for patient to return call shirlene nelson requested Ritalin refill, needs appt-partial refill only.  Nataliia Wilson

## 2018-02-19 NOTE — TELEPHONE ENCOUNTER
Pt notified that the written RX is ready at the [facility]  registration to be picked up.

## 2018-03-16 ENCOUNTER — OFFICE VISIT (OUTPATIENT)
Dept: FAMILY MEDICINE | Facility: OTHER | Age: 63
End: 2018-03-16
Attending: NURSE PRACTITIONER
Payer: COMMERCIAL

## 2018-03-16 VITALS
HEIGHT: 65 IN | OXYGEN SATURATION: 98 % | WEIGHT: 131 LBS | BODY MASS INDEX: 21.83 KG/M2 | TEMPERATURE: 98.3 F | HEART RATE: 81 BPM | DIASTOLIC BLOOD PRESSURE: 78 MMHG | SYSTOLIC BLOOD PRESSURE: 130 MMHG | RESPIRATION RATE: 17 BRPM

## 2018-03-16 DIAGNOSIS — F90.0 ATTENTION DEFICIT HYPERACTIVITY DISORDER (ADHD), PREDOMINANTLY INATTENTIVE TYPE: Primary | ICD-10-CM

## 2018-03-16 PROCEDURE — 99213 OFFICE O/P EST LOW 20 MIN: CPT | Performed by: NURSE PRACTITIONER

## 2018-03-16 RX ORDER — METHYLPHENIDATE HYDROCHLORIDE 10 MG/1
10 TABLET ORAL 3 TIMES DAILY
Qty: 90 TABLET | Refills: 0 | Status: SHIPPED | OUTPATIENT
Start: 2018-03-16 | End: 2018-07-16

## 2018-03-16 ASSESSMENT — ANXIETY QUESTIONNAIRES
3. WORRYING TOO MUCH ABOUT DIFFERENT THINGS: NOT AT ALL
5. BEING SO RESTLESS THAT IT IS HARD TO SIT STILL: NOT AT ALL
6. BECOMING EASILY ANNOYED OR IRRITABLE: NOT AT ALL
7. FEELING AFRAID AS IF SOMETHING AWFUL MIGHT HAPPEN: NOT AT ALL
4. TROUBLE RELAXING: NOT AT ALL
1. FEELING NERVOUS, ANXIOUS, OR ON EDGE: NOT AT ALL
2. NOT BEING ABLE TO STOP OR CONTROL WORRYING: NOT AT ALL
GAD7 TOTAL SCORE: 0

## 2018-03-16 ASSESSMENT — PAIN SCALES - GENERAL: PAINLEVEL: NO PAIN (0)

## 2018-03-16 NOTE — PATIENT INSTRUCTIONS
Attention-Deficit/Hyperactivity Disorder (ADHD) in Adults  You ve always had trouble concentrating. Your mind wanders, and it s hard to finish tasks. As a result, you didn t do well in school. And now, you often struggle with your job. Sometimes this makes you goldstein or depressed. These may be symptoms of attention-deficit/hyperactivity disorder (ADHD). To find out more, talk to your healthcare provider. He or she can offer guidance and support.  Symptoms  of ADHD in adults  For an adult to be diagnosed with ADHD, the symptoms must have been present since childhood. The symptoms may include:    Trouble thinking things through    Low self-esteem    Depression    Trouble holding a job    Memory problems    Problems with a marriage or relationship    Lack of discipline   What is ADHD?  Attention-deficit/hyperactivity disorder makes it hard to focus your mind. You may daydream a lot. And you may be restless much of the time. As a result, you may have trouble with detailed or boring work. And it may be hard to stick with one project for very long. You also may forget things. Or, you may miss key points during a lecture or meeting. You may even have trouble sitting through a movie or concert. At times, you may feel frustrated or angry. This can affect your relationships with others.  Who does it affect?  ADHD starts in childhood. Sometimes, your symptoms may improve as you get older. But they also may persist into your adult years. ADHD is often thought of as a  kid s problem.  That s why it s often missed in adults. In fact, many parents learn they have ADHD when their children are diagnosed.  What causes it?  The exact cause of ADHD isn t known. The disorder does run in families. Having one parent with ADHD makes it more likely you ll have it too. And the part of your brain that controls attention may be involved. Certain brain chemicals that are out of balance may also play a role.  What can be done?  The first step  is finding out if you really have ADHD. Your doctor will use special guidelines to diagnose the disorder. Most adults with ADHD are greatly helped by therapy and coaching. In some cases, your doctor may also prescribe medicine to ease your symptoms.  Resources    National Resource Center on AD/HDwww.porg4yztk.org    Attention Deficit Disorder Associationwww.add.org   Date Last Reviewed: 1/1/2017 2000-2017 [employer]. [address] [place] [address], [place] [address]. All rights reserved. This information is not intended as a substitute for professional medical care. Always follow your healthcare professional's instructions.

## 2018-03-16 NOTE — NURSING NOTE
"Chief Complaint   Patient presents with     A.D.H.D     follow up       Initial /78 (BP Location: Right arm, Patient Position: Chair, Cuff Size: Adult Regular)  Pulse 81  Temp 98.3  F (36.8  C) (Tympanic)  Resp 17  Ht 5' 5\" (1.651 m)  Wt 131 lb (59.4 kg)  SpO2 98%  BMI 21.8 kg/m2 Estimated body mass index is 21.8 kg/(m^2) as calculated from the following:    Height as of this encounter: 5' 5\" (1.651 m).    Weight as of this encounter: 131 lb (59.4 kg).  Medication Reconciliation: complete   Tasia Hernandez LPN      "

## 2018-03-16 NOTE — PROGRESS NOTES
SUBJECTIVE:   Anne Marie Nix is a 62 year old female who presents to clinic today for the following health issues:    ADHD follow up    Anne Marie did run out of her methylphenidate. Plan was to due a drug screen today - will plan for drug screen at next visit    Amount of exercise or physical activity: 4-5 days/week for an average of greater than 60 minutes    Problems taking medications regularly: No    Medication side effects: none    Diet: regular (no restrictions)    Last follow up: 10/10/17              Problem list and histories reviewed & adjusted, as indicated.  Additional history: as documented    Patient Active Problem List   Diagnosis     Multiple respiratory allergies     ACP (advance care planning)     Attention deficit hyperactivity disorder (ADHD), predominantly inattentive type     History reviewed. No pertinent surgical history.    Social History   Substance Use Topics     Smoking status: Never Smoker     Smokeless tobacco: Never Used     Alcohol use No     Family History   Problem Relation Age of Onset     Allergies Mother      Breast Cancer Mother      DIABETES Mother      Cardiovascular Paternal Grandfather      Allergies Paternal Grandfather      Other - See Comments Father          Current Outpatient Prescriptions   Medication Sig Dispense Refill     methylphenidate (RITALIN) 10 MG tablet Take 1 tablet (10 mg) by mouth 3 times daily 90 tablet 0     valACYclovir (VALTREX) 500 MG tablet TK 4 TS AT FIRST SIGN OF ATTACK AND 4 TS 12 HOURS LATER  0     ibuprofen (ADVIL/MOTRIN) 600 MG tablet TK 1 T PO Q 6 HOURS PRF PAIN  0     HYDROcodone-acetaminophen (NORCO)  MG per tablet TK SS TO 1 T PO Q 6 HOURS PRF PAIN  0     pseudoePHEDrine (SUDAFED) 30 MG tablet Take 2 tablets by mouth every 4 hours as needed.       Allergies   Allergen Reactions     Amoxicillin Trihydrate Other (See Comments)     Headache  Stomach aches  Augmentin         Clavulanic Acid Potassium Other (See Comments)      "Headaches  Stomach aches  Augmentin         Reviewed and updated as needed this visit by clinical staff  Tobacco  Allergies  Meds  Med Hx  Surg Hx  Fam Hx  Soc Hx      Reviewed and updated as needed this visit by Provider         ROS:  CONSTITUTIONAL: NEGATIVE for fever, chills, change in weight  INTEGUMENTARY/SKIN: NEGATIVE for worrisome rashes, moles or lesions  RESP: NEGATIVE for significant cough or SOB  CV: NEGATIVE for chest pain, palpitations or peripheral edema  PSYCHIATRIC: ADHD - difficult to focus Ritalin helps her to focus    OBJECTIVE:     /78 (BP Location: Right arm, Patient Position: Chair, Cuff Size: Adult Regular)  Pulse 81  Temp 98.3  F (36.8  C) (Tympanic)  Resp 17  Ht 5' 5\" (1.651 m)  Wt 131 lb (59.4 kg)  SpO2 98%  BMI 21.8 kg/m2  Body mass index is 21.8 kg/(m^2).   GENERAL: healthy, alert and no distress  NECK: no adenopathy, no asymmetry, masses, or scars and thyroid normal to palpation  RESP: lungs clear to auscultation - no rales, rhonchi or wheezes  CV: regular rate and rhythm, normal S1 S2, no S3 or S4, no murmur, click or rub, no peripheral edema and peripheral pulses strong  PSYCH: mentation appears normal, affect normal/bright and anxious    Diagnostic Test Results:  none     ASSESSMENT/PLAN:     1. Attention deficit hyperactivity disorder (ADHD), predominantly inattentive type  Anne Marie states it is difficult to get to [place] on a regular basis to  her prescriptions. Discussed with Anne Marie that she will need a Drug Screen due to before next refill. She was given 1/2 a prescription last month due to needing an appointment. She was out of Ritalin for a few days. Filled script today and explained she will need to follow up in a month. Also discussed with her that there is a [facility] in [place] and she could establish with a PCP there for her convenience. She stated that may be easier. Anne Marie states she will follow up.  - methylphenidate (RITALIN) 10 MG " tablet; Take 1 tablet (10 mg) by mouth 3 times daily  Dispense: 90 tablet; Refill: 0        See Patient Instructions    DEEPA Jama [facility]

## 2018-03-16 NOTE — MR AVS SNAPSHOT
After Visit Summary   3/16/2018    Anne Marie Nix    MRN: [id number]           Patient Information     Date Of Birth          1955        Visit Information        Provider Department      3/16/2018 9:40 AM Kim Wheat APRN [facility]        Today's Diagnoses     Attention deficit hyperactivity disorder (ADHD), predominantly inattentive type    -  1      Care Instructions      Attention-Deficit/Hyperactivity Disorder (ADHD) in Adults  You ve always had trouble concentrating. Your mind wanders, and it s hard to finish tasks. As a result, you didn t do well in school. And now, you often struggle with your job. Sometimes this makes you goldstein or depressed. These may be symptoms of attention-deficit/hyperactivity disorder (ADHD). To find out more, talk to your healthcare provider. He or she can offer guidance and support.  Symptoms  of ADHD in adults  For an adult to be diagnosed with ADHD, the symptoms must have been present since childhood. The symptoms may include:    Trouble thinking things through    Low self-esteem    Depression    Trouble holding a job    Memory problems    Problems with a marriage or relationship    Lack of discipline   What is ADHD?  Attention-deficit/hyperactivity disorder makes it hard to focus your mind. You may daydream a lot. And you may be restless much of the time. As a result, you may have trouble with detailed or boring work. And it may be hard to stick with one project for very long. You also may forget things. Or, you may miss key points during a lecture or meeting. You may even have trouble sitting through a movie or concert. At times, you may feel frustrated or angry. This can affect your relationships with others.  Who does it affect?  ADHD starts in childhood. Sometimes, your symptoms may improve as you get older. But they also may persist into your adult years. ADHD is often thought of as a  kid s problem.  That s why it s often  missed in adults. In fact, many parents learn they have ADHD when their children are diagnosed.  What causes it?  The exact cause of ADHD isn t known. The disorder does run in families. Having one parent with ADHD makes it more likely you ll have it too. And the part of your brain that controls attention may be involved. Certain brain chemicals that are out of balance may also play a role.  What can be done?  The first step is finding out if you really have ADHD. Your doctor will use special guidelines to diagnose the disorder. Most adults with ADHD are greatly helped by therapy and coaching. In some cases, your doctor may also prescribe medicine to ease your symptoms.  Resources    National Resource Center on AD/HDwww.ysbn0poyz.org    Attention Deficit Disorder Associationwww.add.org   Date Last Reviewed: 1/1/2017 2000-2017 [employer]. [address] [place] [address]. All rights reserved. This information is not intended as a substitute for professional medical care. Always follow your healthcare professional's instructions.                Follow-ups after your visit        Follow-up notes from your care team     Return in about 4 weeks (around 4/13/2018).      Who to contact     If you have questions or need follow up information about today's clinic visit or your schedule please contact [facility] directly at [phone].  Normal or non-critical lab and imaging results will be communicated to you by MyChart, letter or phone within 4 business days after the clinic has received the results. If you do not hear from us within 7 days, please contact the clinic through MyChart or phone. If you have a critical or abnormal lab result, we will notify you by phone as soon as possible.  Submit refill requests through [employer] or call your pharmacy and they will forward the refill request to us. Please allow 3 business days for your refill to be completed.          Additional  "Information About Your Visit        MyChart Information     [employer] lets you send messages to your doctor, view your test results, renew your prescriptions, schedule appointments and more. To sign up, go to [email or web address] . Click on \"Log in\" on the left side of the screen, which will take you to the Welcome page. Then click on \"Sign up Now\" on the right side of the page.     You will be asked to enter the access code listed below, as well as some personal information. Please follow the directions to create your username and password.     Your access code is: [email or web address]  Expires: 6/10/2018 12:46 PM     Your access code will  in 90 days. If you need help or a new code, please call your [facility] clinic or [phone].        Care EveryWhere ID     This is your Care EveryWhere ID. This could be used by other organizations to access your [facility] medical records  [id number]        Your Vitals Were     Pulse Temperature Respirations Height Pulse Oximetry BMI (Body Mass Index)    81 98.3  F (36.8  C) (Tympanic) 17 5' 5\" (1.651 m) 98% 21.8 kg/m2       Blood Pressure from Last 3 Encounters:   18 130/78   10/10/17 126/72   17 128/70    Weight from Last 3 Encounters:   18 131 lb (59.4 kg)   10/10/17 128 lb (58.1 kg)   17 128 lb (58.1 kg)              Today, you had the following     No orders found for display         Where to get your medicines      Some of these will need a paper prescription and others can be bought over the counter.  Ask your nurse if you have questions.     Bring a paper prescription for each of these medications     methylphenidate 10 MG tablet          Primary Care Provider Office Phone # Fax #    DEEPA Cobb [phone] [address] [place] [address]        Equal Access to Services     ROCIO ALTMAN AH: Maria Alejandra Lang, nydia bourgeois, leah montanaarash la'aan " ah. So [facility] [phone].    ATENCIÓN: Si janessa kelly, tiene a mcconnell disposición servicios gratuitos de asistencia lingüística. Fede granger [phone].    We comply with applicable federal civil rights laws and [place] laws. We do not discriminate on the basis of race, color, national origin, age, disability, sex, sexual orientation, or gender identity.            Thank you!     Thank you for choosing [facility]  for your care. Our goal is always to provide you with excellent care. Hearing back from our patients is one way we can continue to improve our services. Please take a few minutes to complete the written survey that you may receive in the mail after your visit with us. Thank you!             Your Updated Medication List - Protect others around you: Learn how to safely use, store and throw away your medicines at www.disposemymeds.org.          This list is accurate as of 3/16/18 10:01 AM.  Always use your most recent med list.                   Brand Name Dispense Instructions for use Diagnosis    HYDROcodone-acetaminophen  MG per tablet    NORCO     TK SS TO 1 T PO Q 6 HOURS PRF PAIN        ibuprofen 600 MG tablet    ADVIL/MOTRIN     TK 1 T PO Q 6 HOURS PRF PAIN        methylphenidate 10 MG tablet    RITALIN    90 tablet    Take 1 tablet (10 mg) by mouth 3 times daily    Attention deficit hyperactivity disorder (ADHD), predominantly inattentive type       SUDAFED 30 MG tablet   Generic drug:  pseudoePHEDrine      Take 2 tablets by mouth every 4 hours as needed.        valACYclovir 500 MG tablet    VALTREX     TK 4 TS AT FIRST SIGN OF ATTACK AND 4 TS 12 HOURS LATER

## 2018-03-17 ASSESSMENT — PATIENT HEALTH QUESTIONNAIRE - PHQ9: SUM OF ALL RESPONSES TO PHQ QUESTIONS 1-9: 0

## 2018-03-17 ASSESSMENT — ANXIETY QUESTIONNAIRES: GAD7 TOTAL SCORE: 0

## 2018-04-11 ENCOUNTER — APPOINTMENT (OUTPATIENT)
Dept: LAB | Facility: OTHER | Age: 63
End: 2018-04-11
Attending: NURSE PRACTITIONER
Payer: COMMERCIAL

## 2018-04-11 ENCOUNTER — OFFICE VISIT (OUTPATIENT)
Dept: FAMILY MEDICINE | Facility: OTHER | Age: 63
End: 2018-04-11
Attending: NURSE PRACTITIONER
Payer: COMMERCIAL

## 2018-04-11 VITALS
WEIGHT: 128 LBS | BODY MASS INDEX: 21.33 KG/M2 | HEIGHT: 65 IN | RESPIRATION RATE: 18 BRPM | SYSTOLIC BLOOD PRESSURE: 132 MMHG | DIASTOLIC BLOOD PRESSURE: 78 MMHG | TEMPERATURE: 98.2 F | HEART RATE: 96 BPM | OXYGEN SATURATION: 98 %

## 2018-04-11 DIAGNOSIS — F90.0 ATTENTION DEFICIT HYPERACTIVITY DISORDER (ADHD), PREDOMINANTLY INATTENTIVE TYPE: Primary | ICD-10-CM

## 2018-04-11 PROCEDURE — 80307 DRUG TEST PRSMV CHEM ANLYZR: CPT | Mod: 90 | Performed by: NURSE PRACTITIONER

## 2018-04-11 PROCEDURE — 99213 OFFICE O/P EST LOW 20 MIN: CPT | Performed by: NURSE PRACTITIONER

## 2018-04-11 PROCEDURE — 99000 SPECIMEN HANDLING OFFICE-LAB: CPT | Performed by: NURSE PRACTITIONER

## 2018-04-11 RX ORDER — METHYLPHENIDATE HYDROCHLORIDE 10 MG/1
10 TABLET ORAL 3 TIMES DAILY
Qty: 90 TABLET | Refills: 0 | Status: SHIPPED | OUTPATIENT
Start: 2018-04-11 | End: 2018-05-11

## 2018-04-11 ASSESSMENT — ANXIETY QUESTIONNAIRES
6. BECOMING EASILY ANNOYED OR IRRITABLE: NOT AT ALL
2. NOT BEING ABLE TO STOP OR CONTROL WORRYING: NOT AT ALL
GAD7 TOTAL SCORE: 0
4. TROUBLE RELAXING: NOT AT ALL
1. FEELING NERVOUS, ANXIOUS, OR ON EDGE: NOT AT ALL
3. WORRYING TOO MUCH ABOUT DIFFERENT THINGS: NOT AT ALL
5. BEING SO RESTLESS THAT IT IS HARD TO SIT STILL: NOT AT ALL
7. FEELING AFRAID AS IF SOMETHING AWFUL MIGHT HAPPEN: NOT AT ALL

## 2018-04-11 ASSESSMENT — PAIN SCALES - GENERAL: PAINLEVEL: NO PAIN (0)

## 2018-04-11 NOTE — MR AVS SNAPSHOT
After Visit Summary   4/11/2018    Anne Marie Nix    MRN: [id number]           Patient Information     Date Of Birth          1955        Visit Information        Provider Department      4/11/2018 1:00 PM Kim Wheat APRN [facility]        Today's Diagnoses     Attention deficit hyperactivity disorder (ADHD), predominantly inattentive type    -  1      Care Instructions      Attention-Deficit/Hyperactivity Disorder (ADHD) in Adults  You ve always had trouble concentrating. Your mind wanders, and it s hard to finish tasks. As a result, you didn t do well in school. And now, you often struggle with your job. Sometimes this makes you goldstein or depressed. These may be symptoms of attention-deficit/hyperactivity disorder (ADHD). To find out more, talk to your healthcare provider. He or she can offer guidance and support.  Symptoms  of ADHD in adults  For an adult to be diagnosed with ADHD, the symptoms must have been present since childhood. The symptoms may include:    Trouble thinking things through    Low self-esteem    Depression    Trouble holding a job    Memory problems    Problems with a marriage or relationship    Lack of discipline   What is ADHD?  Attention-deficit/hyperactivity disorder makes it hard to focus your mind. You may daydream a lot. And you may be restless much of the time. As a result, you may have trouble with detailed or boring work. And it may be hard to stick with one project for very long. You also may forget things. Or, you may miss key points during a lecture or meeting. You may even have trouble sitting through a movie or concert. At times, you may feel frustrated or angry. This can affect your relationships with others.  Who does it affect?  ADHD starts in childhood. Sometimes, your symptoms may improve as you get older. But they also may persist into your adult years. ADHD is often thought of as a  kid s problem.  That s why it s often  missed in adults. In fact, many parents learn they have ADHD when their children are diagnosed.  What causes it?  The exact cause of ADHD isn t known. The disorder does run in families. Having one parent with ADHD makes it more likely you ll have it too. And the part of your brain that controls attention may be involved. Certain brain chemicals that are out of balance may also play a role.  What can be done?  The first step is finding out if you really have ADHD. Your doctor will use special guidelines to diagnose the disorder. Most adults with ADHD are greatly helped by therapy and coaching. In some cases, your doctor may also prescribe medicine to ease your symptoms.  Resources    National Resource Center on AD/HDwww.brwk8ppoa.org    Attention Deficit Disorder Associationwww.add.org   Date Last Reviewed: 1/1/2017 2000-2017 [employer]. [address] [place] [address]. All rights reserved. This information is not intended as a substitute for professional medical care. Always follow your healthcare professional's instructions.                Follow-ups after your visit        Follow-up notes from your care team     Return in about 3 months (around 7/11/2018) for Physical Exam, ADHD.      Your next 10 appointments already scheduled     Jul 10, 2018  8:00 AM CDT   (Arrive by 7:45 AM)   PHYSICAL with DEEPA Cobb CNP   [facility] ([facility] )    [address] [place] [address] [place] [address] [phone]              Who to contact     If you have questions or need follow up information about today's clinic visit or your schedule please contact [facility] directly at [phone].  Normal or non-critical lab and imaging results will be communicated to you by MyChart, letter or phone within 4 business days after the clinic has received the results. If you do not hear from us within 7 days, please contact the clinic through MyChart or  "phone. If you have a critical or abnormal lab result, we will notify you by phone as soon as possible.  Submit refill requests through [employer] or call your pharmacy and they will forward the refill request to us. Please allow 3 business days for your refill to be completed.          Additional Information About Your Visit        [employer] Information     [employer] lets you send messages to your doctor, view your test results, renew your prescriptions, schedule appointments and more. To sign up, go to [email or web address] . Click on \"Log in\" on the left side of the screen, which will take you to the Welcome page. Then click on \"Sign up Now\" on the right side of the page.     You will be asked to enter the access code listed below, as well as some personal information. Please follow the directions to create your username and password.     Your access code is: [email or web address]  Expires: 6/10/2018 12:46 PM     Your access code will  in 90 days. If you need help or a new code, please call your [facility] clinic or [phone].        Care EveryWhere ID     This is your Care EveryWhere ID. This could be used by other organizations to access your [facility] medical records  [id number]        Your Vitals Were     Pulse Temperature Respirations Height Pulse Oximetry BMI (Body Mass Index)    96 98.2  F (36.8  C) (Tympanic) 18 5' 5\" (1.651 m) 98% 21.3 kg/m2       Blood Pressure from Last 3 Encounters:   18 132/78   18 130/78   10/10/17 126/72    Weight from Last 3 Encounters:   18 128 lb (58.1 kg)   18 131 lb (59.4 kg)   10/10/17 128 lb (58.1 kg)              We Performed the Following     Pain Drug Scr UR W Rptd Meds          Today's Medication Changes          These changes are accurate as of 18  1:42 PM.  If you have any questions, ask your nurse or doctor.               These medicines have changed or have updated prescriptions.        Dose/Directions    * methylphenidate 10 MG tablet "   Commonly known as:  RITALIN   This may have changed:  Another medication with the same name was added. Make sure you understand how and when to take each.   Used for:  Attention deficit hyperactivity disorder (ADHD), predominantly inattentive type   Changed by:  Kim Wheat APRN CNP        Dose:  10 mg   Take 1 tablet (10 mg) by mouth 3 times daily   Quantity:  90 tablet   Refills:  0       * methylphenidate 10 MG tablet   Commonly known as:  RITALIN   This may have changed:  You were already taking a medication with the same name, and this prescription was added. Make sure you understand how and when to take each.   Used for:  Attention deficit hyperactivity disorder (ADHD), predominantly inattentive type   Changed by:  Kim Wheat APRN CNP        Dose:  10 mg   Take 1 tablet (10 mg) by mouth 3 times daily   Quantity:  90 tablet   Refills:  0       * methylphenidate 10 MG tablet   Commonly known as:  RITALIN   This may have changed:  You were already taking a medication with the same name, and this prescription was added. Make sure you understand how and when to take each.   Used for:  Attention deficit hyperactivity disorder (ADHD), predominantly inattentive type   Changed by:  Kim Wheat APRN CNP        Dose:  10 mg   Take 1 tablet (10 mg) by mouth 3 times daily   Quantity:  90 tablet   Refills:  0       * methylphenidate 10 MG tablet   Commonly known as:  RITALIN   This may have changed:  You were already taking a medication with the same name, and this prescription was added. Make sure you understand how and when to take each.   Used for:  Attention deficit hyperactivity disorder (ADHD), predominantly inattentive type   Changed by:  Kim Wheat APRN CNP        Dose:  10 mg   Take 1 tablet (10 mg) by mouth 3 times daily   Quantity:  90 tablet   Refills:  0       * Notice:  This list has 4 medication(s) that are the same as other medications prescribed for  you. Read the directions carefully, and ask your doctor or other care provider to review them with you.         Where to get your medicines      Some of these will need a paper prescription and others can be bought over the counter.  Ask your nurse if you have questions.     Bring a paper prescription for each of these medications     methylphenidate 10 MG tablet    methylphenidate 10 MG tablet    methylphenidate 10 MG tablet                Primary Care Provider Office Phone # Fax #    DEEPA Cobb [facility] [phone] [address] [address]        Equal Access to Services     [facility]: Hadii aad ku hadasho Soomaali, waaxda luqadaha, qaybta kaalmada adeegyada, leah jackson . So [facility] [phone].    ATENCIÓN: Si habla español, tiene a mcconnell disposición servicios gratuitos de asistencia lingüística. [facility] [phone].    We comply with applicable federal civil rights laws and [place] laws. We do not discriminate on the basis of race, color, national origin, age, disability, sex, sexual orientation, or gender identity.            Thank you!     Thank you for choosing [facility]  for your care. Our goal is always to provide you with excellent care. Hearing back from our patients is one way we can continue to improve our services. Please take a few minutes to complete the written survey that you may receive in the mail after your visit with us. Thank you!             Your Updated Medication List - Protect others around you: Learn how to safely use, store and throw away your medicines at www.disposemymeds.org.          This list is accurate as of 4/11/18  1:42 PM.  Always use your most recent med list.                   Brand Name Dispense Instructions for use Diagnosis    HYDROcodone-acetaminophen  MG per tablet    NORCO     TK SS TO 1 T PO Q 6 HOURS PRF PAIN        ibuprofen 600 MG tablet    ADVIL/MOTRIN     TK 1 T PO Q  6 HOURS PRF PAIN        * methylphenidate 10 MG tablet    RITALIN    90 tablet    Take 1 tablet (10 mg) by mouth 3 times daily    Attention deficit hyperactivity disorder (ADHD), predominantly inattentive type       * methylphenidate 10 MG tablet    RITALIN    90 tablet    Take 1 tablet (10 mg) by mouth 3 times daily    Attention deficit hyperactivity disorder (ADHD), predominantly inattentive type       * methylphenidate 10 MG tablet    RITALIN    90 tablet    Take 1 tablet (10 mg) by mouth 3 times daily    Attention deficit hyperactivity disorder (ADHD), predominantly inattentive type       * methylphenidate 10 MG tablet    RITALIN    90 tablet    Take 1 tablet (10 mg) by mouth 3 times daily    Attention deficit hyperactivity disorder (ADHD), predominantly inattentive type       SUDAFED 30 MG tablet   Generic drug:  pseudoePHEDrine      Take 2 tablets by mouth every 4 hours as needed.        valACYclovir 500 MG tablet    VALTREX     TK 4 TS AT FIRST SIGN OF ATTACK AND 4 TS 12 HOURS LATER        * Notice:  This list has 4 medication(s) that are the same as other medications prescribed for you. Read the directions carefully, and ask your doctor or other care provider to review them with you.

## 2018-04-11 NOTE — PROGRESS NOTES
SUBJECTIVE:   Anne Marie Nix is a 62 year old female who presents to clinic today for the following health issues:      ADHD      Duration: life long    Description (location/character/radiation): difficulty focusing without medication    Intensity:  moderate    Accompanying signs and symptoms: irritable    History (similar episodes/previous evaluation): family history of ADHD    Precipitating or alleviating factors: None    Therapies tried and outcome: ritalin TID           Problem list and histories reviewed & adjusted, as indicated.  Additional history: as documented    Patient Active Problem List   Diagnosis     Multiple respiratory allergies     ACP (advance care planning)     Attention deficit hyperactivity disorder (ADHD), predominantly inattentive type     History reviewed. No pertinent surgical history.    Social History   Substance Use Topics     Smoking status: Never Smoker     Smokeless tobacco: Never Used     Alcohol use No     Family History   Problem Relation Age of Onset     Allergies Mother      Breast Cancer Mother      DIABETES Mother      Cardiovascular Paternal Grandfather      Allergies Paternal Grandfather      Other - See Comments Father          Current Outpatient Prescriptions   Medication Sig Dispense Refill     methylphenidate (RITALIN) 10 MG tablet Take 1 tablet (10 mg) by mouth 3 times daily 90 tablet 0     valACYclovir (VALTREX) 500 MG tablet TK 4 TS AT FIRST SIGN OF ATTACK AND 4 TS 12 HOURS LATER  0     ibuprofen (ADVIL/MOTRIN) 600 MG tablet TK 1 T PO Q 6 HOURS PRF PAIN  0     HYDROcodone-acetaminophen (NORCO)  MG per tablet TK SS TO 1 T PO Q 6 HOURS PRF PAIN  0     pseudoePHEDrine (SUDAFED) 30 MG tablet Take 2 tablets by mouth every 4 hours as needed.       Allergies   Allergen Reactions     Amoxicillin Trihydrate Other (See Comments)     Headache  Stomach aches  Augmentin         Clavulanic Acid Potassium Other (See Comments)     Headaches  Stomach aches  Augmentin    "      Reviewed and updated as needed this visit by clinical staff  Tobacco  Allergies  Meds  Med Hx  Surg Hx  Fam Hx  Soc Hx      Reviewed and updated as needed this visit by Provider         ROS:  CONSTITUTIONAL: NEGATIVE for fever, chills, change in weight  INTEGUMENTARY/SKIN: NEGATIVE for worrisome rashes, moles or lesions  RESP: NEGATIVE for significant cough or SOB  CV: NEGATIVE for chest pain, palpitations or peripheral edema  PSYCHIATRIC: ADHD    OBJECTIVE:     /78  Pulse 96  Temp 98.2  F (36.8  C) (Tympanic)  Resp 18  Ht 5' 5\" (1.651 m)  Wt 128 lb (58.1 kg)  SpO2 98%  BMI 21.3 kg/m2  Body mass index is 21.3 kg/(m^2).   GENERAL: healthy, alert and no distress  RESP: lungs clear to auscultation - no rales, rhonchi or wheezes  CV: regular rate and rhythm, normal S1 S2, no S3 or S4, no murmur, click or rub, no peripheral edema and peripheral pulses strong  SKIN: no suspicious lesions or rashes  PSYCH: mentation appears normal, affect normal/bright    Diagnostic Test Results:  Drug screen pending    ASSESSMENT/PLAN:     1. Attention deficit hyperactivity disorder (ADHD), predominantly inattentive type  Continue current therapy. Anne Marie states she is able to focus with Ritalin.   - Pain Drug Scr UR W Rptd Meds  - methylphenidate (RITALIN) 10 MG tablet; Take 1 tablet (10 mg) by mouth 3 times daily  Dispense: 90 tablet; Refill: 0  - methylphenidate (RITALIN) 10 MG tablet; Take 1 tablet (10 mg) by mouth 3 times daily  Dispense: 90 tablet; Refill: 0  - methylphenidate (RITALIN) 10 MG tablet; Take 1 tablet (10 mg) by mouth 3 times daily  Dispense: 90 tablet; Refill: 0    Plan for follow up in 3 months.     Discussed with Anne Marie she is due for a physical. Plan for physical with next visit. Anne Marie agrees with plan.     See Patient Instructions    DEEPA Jama [facility] [place]    "

## 2018-04-11 NOTE — NURSING NOTE
"Chief Complaint   Patient presents with     A.D.H.D       Initial /78  Pulse 96  Temp 98.2  F (36.8  C) (Tympanic)  Resp 18  Ht 5' 5\" (1.651 m)  Wt 128 lb (58.1 kg)  SpO2 98%  BMI 21.3 kg/m2 Estimated body mass index is 21.3 kg/(m^2) as calculated from the following:    Height as of this encounter: 5' 5\" (1.651 m).    Weight as of this encounter: 128 lb (58.1 kg).  Medication Reconciliation: complete   Nataliia Wilson      "

## 2018-04-11 NOTE — PATIENT INSTRUCTIONS
Attention-Deficit/Hyperactivity Disorder (ADHD) in Adults  You ve always had trouble concentrating. Your mind wanders, and it s hard to finish tasks. As a result, you didn t do well in school. And now, you often struggle with your job. Sometimes this makes you goldstein or depressed. These may be symptoms of attention-deficit/hyperactivity disorder (ADHD). To find out more, talk to your healthcare provider. He or she can offer guidance and support.  Symptoms  of ADHD in adults  For an adult to be diagnosed with ADHD, the symptoms must have been present since childhood. The symptoms may include:    Trouble thinking things through    Low self-esteem    Depression    Trouble holding a job    Memory problems    Problems with a marriage or relationship    Lack of discipline   What is ADHD?  Attention-deficit/hyperactivity disorder makes it hard to focus your mind. You may daydream a lot. And you may be restless much of the time. As a result, you may have trouble with detailed or boring work. And it may be hard to stick with one project for very long. You also may forget things. Or, you may miss key points during a lecture or meeting. You may even have trouble sitting through a movie or concert. At times, you may feel frustrated or angry. This can affect your relationships with others.  Who does it affect?  ADHD starts in childhood. Sometimes, your symptoms may improve as you get older. But they also may persist into your adult years. ADHD is often thought of as a  kid s problem.  That s why it s often missed in adults. In fact, many parents learn they have ADHD when their children are diagnosed.  What causes it?  The exact cause of ADHD isn t known. The disorder does run in families. Having one parent with ADHD makes it more likely you ll have it too. And the part of your brain that controls attention may be involved. Certain brain chemicals that are out of balance may also play a role.  What can be done?  The first step  is finding out if you really have ADHD. Your doctor will use special guidelines to diagnose the disorder. Most adults with ADHD are greatly helped by therapy and coaching. In some cases, your doctor may also prescribe medicine to ease your symptoms.  Resources    National Resource Center on AD/HDwww.cqwv8xdpk.org    Attention Deficit Disorder Associationwww.add.org   Date Last Reviewed: 1/1/2017 2000-2017 [employer], .com. [address] [place] [address], [place] [address]. All rights reserved. This information is not intended as a substitute for professional medical care. Always follow your healthcare professional's instructions.

## 2018-04-12 ASSESSMENT — ANXIETY QUESTIONNAIRES: GAD7 TOTAL SCORE: 0

## 2018-04-12 ASSESSMENT — PATIENT HEALTH QUESTIONNAIRE - PHQ9: SUM OF ALL RESPONSES TO PHQ QUESTIONS 1-9: 0

## 2018-04-18 LAB — PAIN DRUG SCR UR W RPTD MEDS: NORMAL

## 2018-06-22 ENCOUNTER — OFFICE VISIT (OUTPATIENT)
Dept: FAMILY MEDICINE | Facility: OTHER | Age: 63
End: 2018-06-22
Attending: FAMILY MEDICINE
Payer: COMMERCIAL

## 2018-06-22 VITALS
SYSTOLIC BLOOD PRESSURE: 130 MMHG | OXYGEN SATURATION: 99 % | DIASTOLIC BLOOD PRESSURE: 70 MMHG | HEIGHT: 65 IN | HEART RATE: 91 BPM | RESPIRATION RATE: 18 BRPM | TEMPERATURE: 97.8 F | WEIGHT: 128 LBS | BODY MASS INDEX: 21.33 KG/M2

## 2018-06-22 DIAGNOSIS — J01.90 ACUTE SINUSITIS WITH SYMPTOMS > 10 DAYS: Primary | ICD-10-CM

## 2018-06-22 DIAGNOSIS — Z91.09 ENVIRONMENTAL ALLERGIES: ICD-10-CM

## 2018-06-22 PROCEDURE — 99213 OFFICE O/P EST LOW 20 MIN: CPT | Performed by: FAMILY MEDICINE

## 2018-06-22 RX ORDER — CEFPROZIL 500 MG/1
500 TABLET, FILM COATED ORAL 2 TIMES DAILY
Qty: 20 TABLET | Refills: 0 | Status: SHIPPED | OUTPATIENT
Start: 2018-06-22 | End: 2018-09-10

## 2018-06-22 ASSESSMENT — ANXIETY QUESTIONNAIRES
4. TROUBLE RELAXING: NOT AT ALL
GAD7 TOTAL SCORE: 0
1. FEELING NERVOUS, ANXIOUS, OR ON EDGE: NOT AT ALL
7. FEELING AFRAID AS IF SOMETHING AWFUL MIGHT HAPPEN: NOT AT ALL
6. BECOMING EASILY ANNOYED OR IRRITABLE: NOT AT ALL
5. BEING SO RESTLESS THAT IT IS HARD TO SIT STILL: NOT AT ALL
2. NOT BEING ABLE TO STOP OR CONTROL WORRYING: NOT AT ALL
3. WORRYING TOO MUCH ABOUT DIFFERENT THINGS: NOT AT ALL

## 2018-06-22 ASSESSMENT — PAIN SCALES - GENERAL: PAINLEVEL: NO PAIN (0)

## 2018-06-22 NOTE — PROGRESS NOTES
SUBJECTIVE:                                                    Anne Marie Nix is a 62 year old female who presents to clinic today for the following health issues:      RESPIRATORY SYMPTOMS      Duration: A few weeks on and off.    Description  ear pain/pressure bilatearl, headache, fatigue/malaise and nausea, rhinorrhea    Severity: moderate    Accompanying signs and symptoms: green mucus    History (predisposing factors):  none    Precipitating or alleviating factors: None    Therapies tried and outcome:  Sudafed, but hasn't helped this time    Intolerant to antihistamines - too sedating    Intolerant to nasal steroid sprays - cause epistaxis    Did move recently, cleaning out boxes, basement, etc    No fever, cough, dyspnea, or wheezing, or chest pain    No vomiting or diarrhea      Problem list and histories reviewed & adjusted, as indicated.  Additional history: as documented    Current Outpatient Prescriptions   Medication     cefPROZIL (CEFZIL) 500 MG tablet     HYDROcodone-acetaminophen (NORCO)  MG per tablet     ibuprofen (ADVIL/MOTRIN) 600 MG tablet     methylphenidate (RITALIN) 10 MG tablet     pseudoePHEDrine (SUDAFED) 30 MG tablet     valACYclovir (VALTREX) 500 MG tablet     No current facility-administered medications for this visit.        Patient Active Problem List   Diagnosis     Multiple respiratory allergies     ACP (advance care planning)     Attention deficit hyperactivity disorder (ADHD), predominantly inattentive type     History reviewed. No pertinent surgical history.    Social History   Substance Use Topics     Smoking status: Never Smoker     Smokeless tobacco: Never Used     Alcohol use No     Family History   Problem Relation Age of Onset     Allergies Mother      Breast Cancer Mother      Diabetes Mother      Cardiovascular Paternal Grandfather      Allergies Paternal Grandfather      Other - See Comments Father            ROS:  Constitutional, HEENT, cardiovascular,  "pulmonary, gi and gu systems are negative, except as otherwise noted.    OBJECTIVE:     /70 (BP Location: Left arm, Patient Position: Sitting, Cuff Size: Adult Regular)  Pulse 91  Temp 97.8  F (36.6  C) (Tympanic)  Resp 18  Ht 5' 5\" (1.651 m)  Wt 128 lb (58.1 kg)  SpO2 99%  BMI 21.3 kg/m2  Body mass index is 21.3 kg/(m^2).  GENERAL: healthy, alert and no distress  EYES: Eyes grossly normal to inspection, PERRL and conjunctivae and sclerae normal  HENT: normal cephalic/atraumatic, ear canals and TM's normal, other than slightly dull light reflex left, nasal mucosa edematous , oropharynx clear, oral mucous membranes moist, sinuses: maxillary, frontal, ethmoid tenderness on bilaterally and post nasal drainage noted  NECK: no adenopathy, no asymmetry, masses, or scars and thyroid normal to palpation  RESP: lungs clear to auscultation - no rales, rhonchi or wheezes  CV: regular rate and rhythm, normal S1 S2, no S3 or S4, no murmur, click or rub, no peripheral edema and peripheral pulses strong  MS: no gross musculoskeletal defects noted, no edema  PSYCH: mentation appears normal, affect normal/bright      ASSESSMENT/PLAN:     (J01.90) Acute sinusitis with symptoms > 10 days  (primary encounter diagnosis)  Plan: cefPROZIL (CEFZIL) 500 MG tablet    (Z91.09) Environmental allergies    Allergy to Augementin - caused headaches; ok to try cephalosporin    Patient Instructions   Complete antibiotic course.  Start doing saline sinus rinses, neti pot.   Limit Sudafed use.  Intolerable to antihistamines and nasal steroid sprays.   Follow up as needed.              Ryann Angulo MD  [facility] [place]      "

## 2018-06-22 NOTE — MR AVS SNAPSHOT
"              After Visit Summary   6/22/2018    Anne Marie Nix    MRN: [id number]           Patient Information     Date Of Birth          1955        Visit Information        Provider Department      6/22/2018 1:30 PM Ryann Grant MD [facility]        Today's Diagnoses     Acute sinusitis with symptoms > 10 days    -  1    Environmental allergies          Care Instructions    Complete antibiotic course.  Start doing saline sinus rinses, neti pot.   Limit Sudafed use.  Intolerable to antihistamines and nasal steroid sprays.   Follow up as needed.            Follow-ups after your visit        Your next 10 appointments already scheduled     Jul 10, 2018  8:00 AM CDT   (Arrive by 7:45 AM)   PHYSICAL with DEEPA Cobb CNP   [facility] [place] ([facility] [place] )    [address] [place] [address] [phone]              Who to contact     If you have questions or need follow up information about today's clinic visit or your schedule please contact [facility] directly at [phone].  Normal or non-critical lab and imaging results will be communicated to you by [employer], letter or phone within 4 business days after the clinic has received the results. If you do not hear from us within 7 days, please contact the clinic through MyChart or phone. If you have a critical or abnormal lab result, we will notify you by phone as soon as possible.  Submit refill requests through [employer] or call your pharmacy and they will forward the refill request to us. Please allow 3 business days for your refill to be completed.          Additional Information About Your Visit        MyChart Information     [employer] lets you send messages to your doctor, view your test results, renew your prescriptions, schedule appointments and more. To sign up, go to [email or web address] . Click on \"Log in\" on the left side of the screen, which will take you to the " "Welcome page. Then click on \"Sign up Now\" on the right side of the page.     You will be asked to enter the access code listed below, as well as some personal information. Please follow the directions to create your username and password.     Your access code is: [email or web address]  Expires: 2018  2:05 PM     Your access code will  in 90 days. If you need help or a new code, please call your [facility] clinic or [phone].        Care EveryWhere ID     This is your Care EveryWhere ID. This could be used by other organizations to access your [facility] medical records  [id number]        Your Vitals Were     Pulse Temperature Respirations Height Pulse Oximetry BMI (Body Mass Index)    91 97.8  F (36.6  C) (Tympanic) 18 5' 5\" (1.651 m) 99% 21.3 kg/m2       Blood Pressure from Last 3 Encounters:   18 130/70   18 132/78   18 130/78    Weight from Last 3 Encounters:   18 128 lb (58.1 kg)   18 128 lb (58.1 kg)   18 131 lb (59.4 kg)              Today, you had the following     No orders found for display         Today's Medication Changes          These changes are accurate as of 18  2:05 PM.  If you have any questions, ask your nurse or doctor.               Start taking these medicines.        Dose/Directions    cefPROZIL 500 MG tablet   Commonly known as:  CEFZIL   Used for:  Acute sinusitis with symptoms > 10 days   Started by:  Ryann Grant MD        Dose:  500 mg   Take 1 tablet (500 mg) by mouth 2 times daily   Quantity:  20 tablet   Refills:  0            Where to get your medicines      [facility] [place]  [address] AT [facility] OF  [address] [address]     Phone:  [phone]     cefPROZIL 500 MG tablet                Primary Care Provider Office Phone # Fax #    Kim DEEPA Jordan [facility] [phone]       [address] " [id number]        Equal Access to Services     [facility]: Hadii yan seo nesha Lang, watamikoda luqadaha, qaybta kaandresymone giraldo, leah damian. So [facility] [phone].    ATENCIÓN: Si habla español, tiene a mcconnell disposición servicios gratuitos de asistencia lingüística. Merariame al [phone].    We comply with applicable federal civil rights laws and [place] laws. We do not discriminate on the basis of race, color, national origin, age, disability, sex, sexual orientation, or gender identity.            Thank you!     Thank you for choosing [facility]  for your care. Our goal is always to provide you with excellent care. Hearing back from our patients is one way we can continue to improve our services. Please take a few minutes to complete the written survey that you may receive in the mail after your visit with us. Thank you!             Your Updated Medication List - Protect others around you: Learn how to safely use, store and throw away your medicines at www.disposemymeds.org.          This list is accurate as of 6/22/18  2:05 PM.  Always use your most recent med list.                   Brand Name Dispense Instructions for use Diagnosis    cefPROZIL 500 MG tablet    CEFZIL    20 tablet    Take 1 tablet (500 mg) by mouth 2 times daily    Acute sinusitis with symptoms > 10 days       HYDROcodone-acetaminophen  MG per tablet    NORCO     TK SS TO 1 T PO Q 6 HOURS PRF PAIN        ibuprofen 600 MG tablet    ADVIL/MOTRIN     TK 1 T PO Q 6 HOURS PRF PAIN        methylphenidate 10 MG tablet    RITALIN    90 tablet    Take 1 tablet (10 mg) by mouth 3 times daily    Attention deficit hyperactivity disorder (ADHD), predominantly inattentive type       SUDAFED 30 MG tablet   Generic drug:  pseudoePHEDrine      Take 2 tablets by mouth every 4 hours as needed.        valACYclovir 500 MG tablet    VALTREX     TK 4 TS AT FIRST SIGN OF ATTACK AND 4 TS 12 HOURS LATER

## 2018-06-22 NOTE — PATIENT INSTRUCTIONS
Complete antibiotic course.  Start doing saline sinus rinses, neti pot.   Limit Sudafed use.  Intolerable to antihistamines and nasal steroid sprays.   Follow up as needed.

## 2018-06-22 NOTE — NURSING NOTE
"Chief Complaint   Patient presents with     URI       Initial /70 (BP Location: Left arm, Patient Position: Sitting, Cuff Size: Adult Regular)  Pulse 91  Temp 97.8  F (36.6  C) (Tympanic)  Resp 18  Ht 5' 5\" (1.651 m)  Wt 128 lb (58.1 kg)  SpO2 99%  BMI 21.3 kg/m2 Estimated body mass index is 21.3 kg/(m^2) as calculated from the following:    Height as of this encounter: 5' 5\" (1.651 m).    Weight as of this encounter: 128 lb (58.1 kg).  Medication Reconciliation: complete    Haven Maldonado LPN    "

## 2018-06-23 ASSESSMENT — ANXIETY QUESTIONNAIRES: GAD7 TOTAL SCORE: 0

## 2018-06-23 ASSESSMENT — PATIENT HEALTH QUESTIONNAIRE - PHQ9: SUM OF ALL RESPONSES TO PHQ QUESTIONS 1-9: 0

## 2018-07-16 ENCOUNTER — TELEPHONE (OUTPATIENT)
Dept: FAMILY MEDICINE | Facility: OTHER | Age: 63
End: 2018-07-16

## 2018-07-16 DIAGNOSIS — F90.0 ATTENTION DEFICIT HYPERACTIVITY DISORDER (ADHD), PREDOMINANTLY INATTENTIVE TYPE: ICD-10-CM

## 2018-07-16 RX ORDER — METHYLPHENIDATE HYDROCHLORIDE 10 MG/1
10 TABLET ORAL 3 TIMES DAILY
Qty: 90 TABLET | Refills: 0 | Status: SHIPPED | OUTPATIENT
Start: 2018-07-16 | End: 2018-08-17

## 2018-07-16 NOTE — TELEPHONE ENCOUNTER
Pt notified by phone rx for ritalin is ready to be picked up from registration.  CRISTINA GUAMAN

## 2018-07-16 NOTE — TELEPHONE ENCOUNTER
Patient would like to speak to nurse to see if provider will ok for a refill of her ritalin for one month. Patient is going out of town to be with daughter for a surgery and had to reschedule her physical appointment on 07/18 to 07/27. Patient was advised to call pharmacy to request the Rx and stated she would, but still wanted the nurse to call her back to see if this could be done because she will be out of medication tomorrow.

## 2018-08-16 DIAGNOSIS — F90.0 ATTENTION DEFICIT HYPERACTIVITY DISORDER (ADHD), PREDOMINANTLY INATTENTIVE TYPE: ICD-10-CM

## 2018-08-16 RX ORDER — METHYLPHENIDATE HYDROCHLORIDE 10 MG/1
10 TABLET ORAL 3 TIMES DAILY
Qty: 90 TABLET | Refills: 0 | Status: CANCELLED | OUTPATIENT
Start: 2018-08-16

## 2018-08-16 NOTE — TELEPHONE ENCOUNTER
Ritalin 10 mg      Last Written Prescription Date:  7/16/18  Last Fill Quantity: 90,   # refills: 0  Last Office Visit: 4/11/18  Future Office visit:    Next 5 appointments (look out 90 days)     Aug 24, 2018  9:40 AM CDT   (Arrive by 9:25 AM)   PHYSICAL with DEEPA Cobb CNP   [facility] ([facility] )    [address] [place] [address]   [phone]                   Routing refill request to provider for review/approval because:  Medication is reported/historical

## 2018-08-17 DIAGNOSIS — F90.0 ATTENTION DEFICIT HYPERACTIVITY DISORDER (ADHD), PREDOMINANTLY INATTENTIVE TYPE: ICD-10-CM

## 2018-08-17 RX ORDER — METHYLPHENIDATE HYDROCHLORIDE 10 MG/1
10 TABLET ORAL 3 TIMES DAILY
Qty: 90 TABLET | Refills: 0 | Status: SHIPPED | OUTPATIENT
Start: 2018-08-17 | End: 2018-12-20

## 2018-08-17 NOTE — TELEPHONE ENCOUNTER
Pt notified that the written RX is ready at the [facility]  registration to be picked up.

## 2018-08-17 NOTE — TELEPHONE ENCOUNTER
Ritalin  Last visit: 6.22.18  Last refill: 7.16.18 #90    Next 5 appointments (look out 90 days)     Aug 24, 2018  9:40 AM CDT   (Arrive by 9:25 AM)   PHYSICAL with DEEPA Cobb CNP   [facility] ([facility] [place] )    [address] [place] [address]   [phone]

## 2018-09-10 ENCOUNTER — OFFICE VISIT (OUTPATIENT)
Dept: FAMILY MEDICINE | Facility: OTHER | Age: 63
End: 2018-09-10
Attending: NURSE PRACTITIONER
Payer: COMMERCIAL

## 2018-09-10 VITALS
TEMPERATURE: 97 F | WEIGHT: 119 LBS | DIASTOLIC BLOOD PRESSURE: 72 MMHG | OXYGEN SATURATION: 100 % | BODY MASS INDEX: 19.8 KG/M2 | HEART RATE: 89 BPM | SYSTOLIC BLOOD PRESSURE: 126 MMHG

## 2018-09-10 DIAGNOSIS — J30.2 SEASONAL ALLERGIC RHINITIS, UNSPECIFIED CHRONICITY, UNSPECIFIED TRIGGER: ICD-10-CM

## 2018-09-10 DIAGNOSIS — Z12.11 COLON CANCER SCREENING: ICD-10-CM

## 2018-09-10 DIAGNOSIS — Z12.31 ENCOUNTER FOR SCREENING MAMMOGRAM FOR BREAST CANCER: ICD-10-CM

## 2018-09-10 DIAGNOSIS — F90.0 ATTENTION DEFICIT HYPERACTIVITY DISORDER (ADHD), PREDOMINANTLY INATTENTIVE TYPE: ICD-10-CM

## 2018-09-10 DIAGNOSIS — Z00.00 ROUTINE GENERAL MEDICAL EXAMINATION AT A HEALTH CARE FACILITY: Primary | ICD-10-CM

## 2018-09-10 LAB
ANION GAP SERPL CALCULATED.3IONS-SCNC: 6 MMOL/L (ref 3–14)
BUN SERPL-MCNC: 20 MG/DL (ref 7–30)
CALCIUM SERPL-MCNC: 8.7 MG/DL (ref 8.5–10.1)
CHLORIDE SERPL-SCNC: 108 MMOL/L (ref 94–109)
CHOLEST SERPL-MCNC: 202 MG/DL
CO2 SERPL-SCNC: 26 MMOL/L (ref 20–32)
CREAT SERPL-MCNC: 0.72 MG/DL (ref 0.52–1.04)
GFR SERPL CREATININE-BSD FRML MDRD: 82 ML/MIN/1.7M2
GLUCOSE SERPL-MCNC: 85 MG/DL (ref 70–99)
HDLC SERPL-MCNC: 101 MG/DL
LDLC SERPL CALC-MCNC: 95 MG/DL
NONHDLC SERPL-MCNC: 101 MG/DL
POTASSIUM SERPL-SCNC: 4.2 MMOL/L (ref 3.4–5.3)
SODIUM SERPL-SCNC: 140 MMOL/L (ref 133–144)
TRIGL SERPL-MCNC: 32 MG/DL

## 2018-09-10 PROCEDURE — 87624 HPV HI-RISK TYP POOLED RSLT: CPT | Mod: 90 | Performed by: NURSE PRACTITIONER

## 2018-09-10 PROCEDURE — 80048 BASIC METABOLIC PNL TOTAL CA: CPT | Performed by: NURSE PRACTITIONER

## 2018-09-10 PROCEDURE — G0123 SCREEN CERV/VAG THIN LAYER: HCPCS | Performed by: NURSE PRACTITIONER

## 2018-09-10 PROCEDURE — 99396 PREV VISIT EST AGE 40-64: CPT | Performed by: NURSE PRACTITIONER

## 2018-09-10 PROCEDURE — 80061 LIPID PANEL: CPT | Performed by: NURSE PRACTITIONER

## 2018-09-10 PROCEDURE — 36415 COLL VENOUS BLD VENIPUNCTURE: CPT | Performed by: NURSE PRACTITIONER

## 2018-09-10 PROCEDURE — 99000 SPECIMEN HANDLING OFFICE-LAB: CPT | Performed by: NURSE PRACTITIONER

## 2018-09-10 RX ORDER — METHYLPHENIDATE HYDROCHLORIDE 10 MG/1
10 TABLET ORAL 3 TIMES DAILY
Qty: 90 TABLET | Refills: 0 | Status: SHIPPED | OUTPATIENT
Start: 2018-09-10 | End: 2018-10-10

## 2018-09-10 ASSESSMENT — ANXIETY QUESTIONNAIRES
4. TROUBLE RELAXING: NOT AT ALL
3. WORRYING TOO MUCH ABOUT DIFFERENT THINGS: NOT AT ALL
7. FEELING AFRAID AS IF SOMETHING AWFUL MIGHT HAPPEN: NOT AT ALL
6. BECOMING EASILY ANNOYED OR IRRITABLE: NOT AT ALL
2. NOT BEING ABLE TO STOP OR CONTROL WORRYING: NOT AT ALL
5. BEING SO RESTLESS THAT IT IS HARD TO SIT STILL: NOT AT ALL
GAD7 TOTAL SCORE: 0
1. FEELING NERVOUS, ANXIOUS, OR ON EDGE: NOT AT ALL

## 2018-09-10 ASSESSMENT — PAIN SCALES - GENERAL: PAINLEVEL: NO PAIN (0)

## 2018-09-10 NOTE — LETTER
September 18, 2018      Anne Marie Nix  [address] [facility] [address]        Dear ,    We are writing to inform you of your test results.    Your test results fall within the expected range(s) or remain unchanged from previous results.  Please continue with current treatment plan.    NOTE:   Please notify of normal PAP and negative for HPV testing.     Kim ARENAS FNP-BC   Family Nurse Practitioner  Resulted Orders   A pap thin layer screen with  HPV - recommended age 30 - 65 years (select HPV order below)   Result Value Ref Range    PAP NIL     Copath Report         Patient Name: ANNE MARIE NIX  MR#: [id number]  Specimen #: [id number]  Collected: 9/10/2018  Received: 9/10/2018  Reported: 9/13/2018 10:09  Ordering Phy(s): KIM RODRIGUEZ    For improved result formatting, select 'View Enhanced Report Format' under   Linked Documents section.    SPECIMEN/STAIN PROCESS:  Pap thin layer prep screening ([facility])       Pap-Cyto x 1, HPV ordered x 1    SOURCE: Cervical  ----------------------------------------------------------------   Pap thin layer prep screening ([facility])  SPECIMEN ADEQUACY:  Satisfactory for evaluation.  -Transformation zone component present.    CYTOLOGIC INTERPRETATION:    Negative for intraepithelial lesion or malignancy    Electronically signed out by:  TRISTAN Florian ( ASCP)    Processed and screened at [facility],   Counts include 234 beds at the [facility]    CLINICAL HISTORY:    Post Menopausal,    Papanicolaou Test Limitations:  Cervical cytology is a screening test with   limited sensitivity; r egular  screening is critical for cancer prevention; Pap tests are primarily   effective for the diagnosis/prevention of  squamous cell carcinoma, not adenocarcinomas or other cancers.    TESTING LAB LOCATION:  [address] [address]  [phone]    COLLECTION SITE:  Client:  [facility]  Location: [facility] ()     Lipid Profile (Chol, Trig, HDL, LDL calc)   Result Value Ref Range    Cholesterol 202 (H) <200 mg/dL      Comment:      Desirable:       <200 mg/dl    Triglycerides 32 <150 mg/dL      Comment:      Fasting specimen    HDL Cholesterol 101 >49 mg/dL    LDL Cholesterol Calculated 95 <100 mg/dL      Comment:      Desirable:       <100 mg/dl    Non HDL Cholesterol 101 <130 mg/dL   Basic metabolic panel  (Ca, Cl, CO2, Creat, Gluc, K, Na, BUN)   Result Value Ref Range    Sodium 140 133 - 144 mmol/L    Potassium 4.2 3.4 - 5.3 mmol/L    Chloride 108 94 - 109 mmol/L    Carbon Dioxide 26 20 - 32 mmol/L    Anion Gap 6 3 - 14 mmol/L    Glucose 85 70 - 99 mg/dL      Comment:      Fasting specimen    Urea Nitrogen 20 7 - 30 mg/dL    Creatinine 0.72 0.52 - 1.04 mg/dL    GFR Estimate 82 >60 mL/min/1.7m2      Comment:      Non  GFR Calc    GFR Estimate If Black >90 >60 mL/min/1.7m2      Comment:       GFR Calc    Calcium 8.7 8.5 - 10.1 mg/dL   HPV High Risk Types DNA Cervical   Result Value Ref Range    HPV Source [facility]     HPV 16 DNA Negative NEG^Negative    HPV 18 DNA Negative NEG^Negative    Other HR HPV Negative NEG^Negative    Final Diagnosis This patient's sample is negative for HPV DNA.       Comment:      This test was developed and its performance characteristics determined by the   [facility], [facility]. It   has not been cleared or approved by the FDA. The laboratory is regulated under   CLIA as qualified to perform high-complexity testing. This test is used for   clinical purposes. It should not be regarded as investigational or for   research.  (Note)  METHODOLOGY:  The Roche yusuf 4800 system uses automated extraction,   simultaneous amplification of HPV (L1 region) and beta-globin,    followed by  real time detection of fluorescent labeled HPV and beta   globin using specific oligonucleotide  probes . The test specifically   identifies types HPV 16 DNA and HPV 18 DNA while concurrently   detecting the rest of the high risk types (31, 33, 35, 39, 45, 51,   52, 56, 58, 59, 66 or 68).  COMMENTS:  This test is not intended for use as a screening device   for women under age 30 with normal cervical cytology.  Results should   be correl  ated with cytologic and histologic findings. Close clinical   followup is recommended.      Specimen Description Cervical Cells       Comment:             If you have any questions or concerns, please call the clinic at the number listed above.       Sincerely,        DEEPA Jama CNP

## 2018-09-10 NOTE — MR AVS SNAPSHOT
After Visit Summary   9/10/2018    Anne Marie Nix    MRN: [id number]           Patient Information     Date Of Birth          1955        Visit Information        Provider Department      9/10/2018 9:20 AM Kim Wheat APRN [facility]        Today's Diagnoses     Routine general medical examination at a health care facility    -  1    Colon cancer screening        Encounter for screening mammogram for breast cancer        Seasonal allergic rhinitis, unspecified chronicity, unspecified trigger        Attention deficit hyperactivity disorder (ADHD), predominantly inattentive type          Care Instructions      Preventive Health Recommendations  Female Ages 50 - 64    Yearly exam: See your health care provider every year in order to  o Review health changes.   o Discuss preventive care.    o Review your medicines if your doctor has prescribed any.      Get a Pap test every three years (unless you have an abnormal result and your provider advises testing more often).    If you get Pap tests with HPV test, you only need to test every 5 years, unless you have an abnormal result.     You do not need a Pap test if your uterus was removed (hysterectomy) and you have not had cancer.    You should be tested each year for STDs (sexually transmitted diseases) if you're at risk.     Have a mammogram every 1 to 2 years.    Have a colonoscopy at age 50, or have a yearly FIT test (stool test). These exams screen for colon cancer.      Have a cholesterol test every 5 years, or more often if advised.    Have a diabetes test (fasting glucose) every three years. If you are at risk for diabetes, you should have this test more often.     If you are at risk for osteoporosis (brittle bone disease), think about having a bone density scan (DEXA).    Shots: Get a flu shot each year. Get a tetanus shot every 10 years.    Nutrition:     Eat at least 5 servings of fruits and vegetables each  day.    Eat whole-grain bread, whole-wheat pasta and brown rice instead of white grains and rice.    Get adequate Calcium and Vitamin D.     Lifestyle    Exercise at least 150 minutes a week (30 minutes a day, 5 days a week). This will help you control your weight and prevent disease.    Limit alcohol to one drink per day.    No smoking.     Wear sunscreen to prevent skin cancer.     See your dentist every six months for an exam and cleaning.    See your eye doctor every 1 to 2 years.            Follow-ups after your visit        Additional Services     GENERAL SURG ADULT REFERRAL       Your provider has referred you to: [facility] [phone]  [email or web address]    Please be aware that coverage of these services is subject to the terms and limitations of your health insurance plan.  Call member services at your health plan with any benefit or coverage questions.      Please bring the following with you to your appointment:    (1) Any X-Rays, CTs or MRIs which have been performed.  Contact the facility where they were done to arrange for  prior to your scheduled appointment.   (2) List of current medications   (3) This referral request   (4) Any documents/labs given to you for this referral            OTOLARYNGOLOGY REFERRAL       Your provider has referred you to: [facility] [phone]   [email or web address]    Please be aware that coverage of these services is subject to the terms and limitations of your health insurance plan.  Call member services at your health plan with any benefit or coverage questions.      Please bring the following to your appointment:  >>   Any x-rays, CTs or MRIs which have been performed.  Contact the facility where they were done to arrange for  prior to your scheduled appointment.  Any new CT, MRI or other procedures ordered by your  specialist must be performed at a [facility] facility or coordinated by your clinic's referral office.    >>   List of current medications   >>   This referral request   >>   Any documents/labs given to you for this referral                  Follow-up notes from your care team     Return in about 3 months (around 12/10/2018) for adhd.      Future tests that were ordered for you today     Open Future Orders        Priority Expected Expires Ordered    MA SCREENING DIGITAL BILATERAL ([place]) Routine  9/10/2019 9/10/2018            Who to contact     If you have questions or need follow up information about today's clinic visit or your schedule please contact [facility] [place] directly at [phone].  Normal or non-critical lab and imaging results will be communicated to you by MyChart, letter or phone within 4 business days after the clinic has received the results. If you do not hear from us within 7 days, please contact the clinic through MyChart or phone. If you have a critical or abnormal lab result, we will notify you by phone as soon as possible.  Submit refill requests through [employer] or call your pharmacy and they will forward the refill request to us. Please allow 3 business days for your refill to be completed.          Additional Information About Your Visit        Care EveryWhere ID     This is your Care EveryWhere ID. This could be used by other organizations to access your [facility] medical records  [id number]        Your Vitals Were     Pulse Temperature Pulse Oximetry BMI (Body Mass Index)          89 97  F (36.1  C) (Tympanic) 100% 19.8 kg/m2         Blood Pressure from Last 3 Encounters:   09/10/18 126/72   06/22/18 130/70   04/11/18 132/78    Weight from Last 3 Encounters:   09/10/18 119 lb (54 kg)   06/22/18 128 lb (58.1 kg)   04/11/18 128 lb (58.1 kg)              We Performed the Following     A pap thin layer screen with  HPV - recommended age 30 - 65 years (select HPV order below)      Basic metabolic panel  (Ca, Cl, CO2, Creat, Gluc, K, Na, BUN)     GENERAL SURG ADULT REFERRAL     Lipid Profile (Chol, Trig, HDL, LDL calc)     OTOLARYNGOLOGY REFERRAL          Today's Medication Changes          These changes are accurate as of 9/10/18 10:12 AM.  If you have any questions, ask your nurse or doctor.               These medicines have changed or have updated prescriptions.        Dose/Directions    * methylphenidate 10 MG tablet   Commonly known as:  RITALIN   This may have changed:  Another medication with the same name was added. Make sure you understand how and when to take each.   Used for:  Attention deficit hyperactivity disorder (ADHD), predominantly inattentive type   Changed by:  Kim Wheat APRN CNP        Dose:  10 mg   Take 1 tablet (10 mg) by mouth 3 times daily   Quantity:  90 tablet   Refills:  0       * methylphenidate 10 MG tablet   Commonly known as:  RITALIN   This may have changed:  You were already taking a medication with the same name, and this prescription was added. Make sure you understand how and when to take each.   Used for:  Attention deficit hyperactivity disorder (ADHD), predominantly inattentive type   Changed by:  Kim Wheat APRN CNP        Dose:  10 mg   Take 1 tablet (10 mg) by mouth 3 times daily   Quantity:  90 tablet   Refills:  0       * methylphenidate 10 MG tablet   Commonly known as:  RITALIN   This may have changed:  You were already taking a medication with the same name, and this prescription was added. Make sure you understand how and when to take each.   Used for:  Attention deficit hyperactivity disorder (ADHD), predominantly inattentive type   Changed by:  Kim Wheat APRN CNP        Dose:  10 mg   Take 1 tablet (10 mg) by mouth 3 times daily   Quantity:  90 tablet   Refills:  0       * methylphenidate 10 MG tablet   Commonly known as:  RITALIN   This may have changed:  You were already taking a medication with  the same name, and this prescription was added. Make sure you understand how and when to take each.   Used for:  Attention deficit hyperactivity disorder (ADHD), predominantly inattentive type   Changed by:  Kim Wheat APRN CNP        Dose:  10 mg   Take 1 tablet (10 mg) by mouth 3 times daily   Quantity:  90 tablet   Refills:  0       * Notice:  This list has 4 medication(s) that are the same as other medications prescribed for you. Read the directions carefully, and ask your doctor or other care provider to review them with you.         Where to get your medicines      Some of these will need a paper prescription and others can be bought over the counter.  Ask your nurse if you have questions.     Bring a paper prescription for each of these medications     methylphenidate 10 MG tablet    methylphenidate 10 MG tablet    methylphenidate 10 MG tablet                Primary Care Provider Office Phone # Fax #    DEEPA Cobb [phone] [address] [facility] [address]        Equal Access to Services     [facility] AH: Hadii aad ku hadasho Soomaali, waaxda luqadaha, qaybta kaalmada adeegyada, waxay idiin haynolan jackson . So [facility] [phone].    ATENCIÓN: Si habla español, tiene a mcconnell disposición servicios gratuitos de asistencia lingüística. Llame al [phone].    We comply with applicable federal civil rights laws and [place] laws. We do not discriminate on the basis of race, color, national origin, age, disability, sex, sexual orientation, or gender identity.            Thank you!     Thank you for choosing [facility]  for your care. Our goal is always to provide you with excellent care. Hearing back from our patients is one way we can continue to improve our services. Please take a few minutes to complete the written survey that you may receive in the mail after your visit with us. Thank you!             Your Updated  Medication List - Protect others around you: Learn how to safely use, store and throw away your medicines at www.disposemymeds.org.          This list is accurate as of 9/10/18 10:12 AM.  Always use your most recent med list.                   Brand Name Dispense Instructions for use Diagnosis    * methylphenidate 10 MG tablet    RITALIN    90 tablet    Take 1 tablet (10 mg) by mouth 3 times daily    Attention deficit hyperactivity disorder (ADHD), predominantly inattentive type       * methylphenidate 10 MG tablet    RITALIN    90 tablet    Take 1 tablet (10 mg) by mouth 3 times daily    Attention deficit hyperactivity disorder (ADHD), predominantly inattentive type       * methylphenidate 10 MG tablet    RITALIN    90 tablet    Take 1 tablet (10 mg) by mouth 3 times daily    Attention deficit hyperactivity disorder (ADHD), predominantly inattentive type       * methylphenidate 10 MG tablet    RITALIN    90 tablet    Take 1 tablet (10 mg) by mouth 3 times daily    Attention deficit hyperactivity disorder (ADHD), predominantly inattentive type       SUDAFED 30 MG tablet   Generic drug:  pseudoePHEDrine      Take 2 tablets by mouth every 4 hours as needed.        valACYclovir 500 MG tablet    VALTREX     TK 4 TS AT FIRST SIGN OF ATTACK AND 4 TS 12 HOURS LATER        * Notice:  This list has 4 medication(s) that are the same as other medications prescribed for you. Read the directions carefully, and ask your doctor or other care provider to review them with you.

## 2018-09-10 NOTE — PROGRESS NOTES
SUBJECTIVE:   CC: Anne Marie Nix is an 63 year old woman who presents for preventive health visit.     Healthy Habits:    Do you get at least three servings of calcium containing foods daily (dairy, green leafy vegetables, etc.)? yes    Amount of exercise or daily activities, outside of work: 3 day(s) per week    Problems taking medications regularly No    Medication side effects: No    Have you had an eye exam in the past two years? no    Do you see a dentist twice per year? yes    Do you have sleep apnea, excessive snoring or daytime drowsiness?no      Medication Followup of Ritalin    Taking Medication as prescribed: yes    Side Effects:  None    Medication Helping Symptoms:  yes       Today's PHQ-2 Score: No flowsheet data found.     PHQ-9 SCORE 4/11/2018 6/22/2018 9/10/2018   Total Score 0 0 0     FABIANA-7 SCORE 4/11/2018 6/22/2018 9/10/2018   Total Score 0 0 0         Abuse: Current or Past(Physical, Sexual or Emotional)- No  Do you feel safe in your environment - Yes    Social History   Substance Use Topics     Smoking status: Never Smoker     Smokeless tobacco: Never Used     Alcohol use No     If you drink alcohol do you typically have >3 drinks per day or >7 drinks per week? No                     Reviewed orders with patient.  Reviewed health maintenance and updated orders accordingly - Yes  BP Readings from Last 3 Encounters:   09/10/18 126/72   06/22/18 130/70   04/11/18 132/78    Wt Readings from Last 3 Encounters:   09/10/18 119 lb (54 kg)   06/22/18 128 lb (58.1 kg)   04/11/18 128 lb (58.1 kg)                  Patient Active Problem List   Diagnosis     Multiple respiratory allergies     ACP (advance care planning)     Attention deficit hyperactivity disorder (ADHD), predominantly inattentive type     History reviewed. No pertinent surgical history.    Social History   Substance Use Topics     Smoking status: Never Smoker     Smokeless tobacco: Never Used     Alcohol use No     Family History    Problem Relation Age of Onset     Allergies Mother      Breast Cancer Mother      Diabetes Mother      Cardiovascular Paternal Grandfather      Allergies Paternal Grandfather      Other - See Comments Father          Current Outpatient Prescriptions   Medication Sig Dispense Refill     methylphenidate (RITALIN) 10 MG tablet Take 1 tablet (10 mg) by mouth 3 times daily 90 tablet 0     pseudoePHEDrine (SUDAFED) 30 MG tablet Take 2 tablets by mouth every 4 hours as needed.       valACYclovir (VALTREX) 500 MG tablet TK 4 TS AT FIRST SIGN OF ATTACK AND 4 TS 12 HOURS LATER  0     Allergies   Allergen Reactions     Amoxicillin Trihydrate Other (See Comments)     Headache  Stomach aches  Augmentin         Clavulanic Acid Potassium Other (See Comments)     Headaches  Stomach aches  Augmentin         Patient over age 50, mutual decision to screen reflected in health maintenance.    Pertinent mammograms are reviewed under the imaging tab.  History of abnormal Pap smear: NO - age 30- 65 PAP every 3 years recommended     Reviewed and updated as needed this visit by clinical staff         Reviewed and updated as needed this visit by Provider            ROS:  CONSTITUTIONAL: NEGATIVE for fever, chills, change in weight  INTEGUMENTARY/SKIN: NEGATIVE for worrisome rashes, moles or lesions  EYES: NEGATIVE for vision changes or irritation  ENT: NEGATIVE for ear, mouth and throat problems  RESP: NEGATIVE for significant cough or SOB  BREAST: NEGATIVE for masses, tenderness or discharge  CV: NEGATIVE for chest pain, palpitations or peripheral edema  GI: NEGATIVE for nausea, abdominal pain, heartburn, or change in bowel habits  : NEGATIVE for unusual urinary or vaginal symptoms. No vaginal bleeding.  MUSCULOSKELETAL: NEGATIVE for significant arthralgias or myalgia  NEURO: NEGATIVE for weakness, dizziness or paresthesias  ENDOCRINE: NEGATIVE for temperature intolerance, skin/hair changes  PSYCHIATRIC: ADHD and situational anxiety.      OBJECTIVE:   There were no vitals taken for this visit.  EXAM:  GENERAL: healthy, alert and no distress  EYES: Eyes grossly normal to inspection, PERRL and conjunctivae and sclerae normal  HENT: ear canals and TM's normal, nose and mouth without ulcers or lesions  NECK: no adenopathy, no asymmetry, masses, or scars and thyroid normal to palpation  RESP: lungs clear to auscultation - no rales, rhonchi or wheezes  BREAST: normal without masses, tenderness or nipple discharge and no palpable axillary masses or adenopathy  CV: regular rate and rhythm, normal S1 S2, no S3 or S4, no murmur, click or rub, no peripheral edema and peripheral pulses strong  ABDOMEN: soft, nontender, no hepatosplenomegaly, no masses and bowel sounds normal   (female): normal female external genitalia, normal urethral meatus , vaginal mucosa pink, moist, well rugated and normal cervix, adnexae, and uterus without masses.  MS: no gross musculoskeletal defects noted, no edema  SKIN: no suspicious lesions or rashes  NEURO: Normal strength and tone, mentation intact and speech normal  PSYCH: mentation appears normal, affect normal/bright  LYMPH: normal ant/post cervical, supraclavicular nodes    Diagnostic Test Results:  Results for orders placed or performed in visit on 09/10/18 (from the past 24 hour(s))   Lipid Profile (Chol, Trig, HDL, LDL calc)   Result Value Ref Range    Cholesterol 202 (H) <200 mg/dL    Triglycerides 32 <150 mg/dL    HDL Cholesterol 101 >49 mg/dL    LDL Cholesterol Calculated 95 <100 mg/dL    Non HDL Cholesterol 101 <130 mg/dL   Basic metabolic panel  (Ca, Cl, CO2, Creat, Gluc, K, Na, BUN)   Result Value Ref Range    Sodium 140 133 - 144 mmol/L    Potassium 4.2 3.4 - 5.3 mmol/L    Chloride 108 94 - 109 mmol/L    Carbon Dioxide 26 20 - 32 mmol/L    Anion Gap 6 3 - 14 mmol/L    Glucose 85 70 - 99 mg/dL    Urea Nitrogen 20 7 - 30 mg/dL    Creatinine 0.72 0.52 - 1.04 mg/dL    GFR Estimate 82 >60 mL/min/1.7m2    GFR  "Estimate If Black >90 >60 mL/min/1.7m2    Calcium 8.7 8.5 - 10.1 mg/dL       ASSESSMENT/PLAN:   1. Routine general medical examination at a health care facility  Follow up yearly for physical  - A pap thin layer screen with  HPV - recommended age 30 - 65 years (select HPV order below)  - Lipid Profile (Chol, Trig, HDL, LDL calc)  - Basic metabolic panel  (Ca, Cl, CO2, Creat, Gluc, K, Na, BUN)    2. Colon cancer screening  Due for colonscreening  - GENERAL SURG ADULT REFERRAL    3. Encounter for screening mammogram for breast cancer  Due for mammogram  - MA SCREENING DIGITAL BILATERAL ([place]); Future    4. Seasonal allergic rhinitis, unspecified chronicity, unspecified trigger  Anne Marie states she moved into a new place and her seasonal allergies are worse. She states she had allergy shots in the past with good relief. She would like additional testing referral placed.   - OTOLARYNGOLOGY REFERRAL    5. Attention deficit hyperactivity disorder (ADHD), predominantly inattentive type  Continue current therapy. Plan for follow up in 3 months.   - methylphenidate (RITALIN) 10 MG tablet; Take 1 tablet (10 mg) by mouth 3 times daily  Dispense: 90 tablet; Refill: 0  - methylphenidate (RITALIN) 10 MG tablet; Take 1 tablet (10 mg) by mouth 3 times daily  Dispense: 90 tablet; Refill: 0  - methylphenidate (RITALIN) 10 MG tablet; Take 1 tablet (10 mg) by mouth 3 times daily  Dispense: 90 tablet; Refill: 0    COUNSELING:   Reviewed preventive health counseling, as reflected in patient instructions       Regular exercise       Healthy diet/nutrition    BP Readings from Last 1 Encounters:   09/10/18 126/72     Estimated body mass index is 21.3 kg/(m^2) as calculated from the following:    Height as of 6/22/18: 5' 5\" (1.651 m).    Weight as of 6/22/18: 128 lb (58.1 kg).    BP Screening:   Last 3 BP Readings:    BP Readings from Last 3 Encounters:   09/10/18 126/72   06/22/18 130/70   04/11/18 132/78       The following was recommended " to the patient:  Re-screen BP within a year and recommended lifestyle modifications       reports that she has never smoked. She has never used smokeless tobacco.      Counseling Resources:  ATP IV Guidelines  Pooled Cohorts Equation Calculator  Breast Cancer Risk Calculator  FRAX Risk Assessment  ICSI Preventive Guidelines  Dietary Guidelines for Americans, 2010  [employer]'s MyPlate  ASA Prophylaxis  Lung CA Screening    DEEPA Jama CNP  [facility]

## 2018-09-10 NOTE — NURSING NOTE
"Chief Complaint   Patient presents with     Physical       Initial /72  Pulse 89  Temp 97  F (36.1  C) (Tympanic)  Wt 119 lb (54 kg)  SpO2 100%  BMI 19.8 kg/m2 Estimated body mass index is 19.8 kg/(m^2) as calculated from the following:    Height as of 6/22/18: 5' 5\" (1.651 m).    Weight as of this encounter: 119 lb (54 kg).  Medication Reconciliation: complete    Nicci Ortega MA  "

## 2018-09-11 ASSESSMENT — PATIENT HEALTH QUESTIONNAIRE - PHQ9: SUM OF ALL RESPONSES TO PHQ QUESTIONS 1-9: 0

## 2018-09-11 ASSESSMENT — ANXIETY QUESTIONNAIRES: GAD7 TOTAL SCORE: 0

## 2018-09-12 ENCOUNTER — TELEPHONE (OUTPATIENT)
Dept: SURGERY | Facility: OTHER | Age: 63
End: 2018-09-12

## 2018-09-12 NOTE — TELEPHONE ENCOUNTER
Referral received for meet and greet colonoscopy. FIRST attempt to call patient to schedule LEFT MESSAGE TO RETURN CALL

## 2018-09-13 LAB
COPATH REPORT: NORMAL
PAP: NORMAL

## 2018-09-14 LAB
FINAL DIAGNOSIS: NORMAL
HPV HR 12 DNA CVX QL NAA+PROBE: NEGATIVE
HPV16 DNA SPEC QL NAA+PROBE: NEGATIVE
HPV18 DNA SPEC QL NAA+PROBE: NEGATIVE
SPECIMEN DESCRIPTION: NORMAL
SPECIMEN SOURCE CVX/VAG CYTO: NORMAL

## 2018-09-28 ENCOUNTER — TELEPHONE (OUTPATIENT)
Dept: SURGERY | Facility: OTHER | Age: 63
End: 2018-09-28

## 2018-09-28 NOTE — TELEPHONE ENCOUNTER
Referral received for meet and greet colonoscopy. 2nd attempt to call patient to schedule. Patient answered but states she needs to check on some dates and call back. Offered to give the patient a list of available dates and she declined. Tried to give patient direct phone number and she declined. She said she will just call what is on her caller ID. Advised patient that what she is seeing is not a direct line and she will have to ask to be transferred to clinic general surgery.   Does not need lab or x-ray prior to procedure.

## 2018-10-08 ENCOUNTER — TELEPHONE (OUTPATIENT)
Dept: SURGERY | Facility: OTHER | Age: 63
End: 2018-10-08

## 2018-10-08 NOTE — TELEPHONE ENCOUNTER
Referral received for meet and greet colonoscopy. 3rd attempt to call patient to schedule. No answer. Left message for patient to return call. Letter sent.

## 2018-10-08 NOTE — LETTER
October 8, 2018          Anne Marie Nix  [address] [place] [address]      Dear Anne Marie,     Our office has received a colonoscopy referral from Kim Wheat CNP for colon cancer screening. We have made three or more unsuccessful attempts to contact you. Colonoscopy is typically recommended every 5-10 years, depending on your history, in order to prevent and detect colon cancer at its earliest stages.  Colon cancer is now the second leading cause of cancer death in the [place] for both men and women. There are over 130,000 new cases and 50,000 deaths per year from colon cancer.  Colonoscopies can prevent a large majority of these deaths. This test is not only looking for cancer, but also precancerous lesions. These lesions can be removed before they ever become cancer. You can be given some sedation which makes the test comfortable for most people. You may schedule an appointment to discuss the procedure first if you prefer.    Colonoscopy is the best screening tool for colon cancer; however, if you do not wish to do a colonoscopy or cannot afford to do one at this time, there are other options.  One option is called a FIT test or Fecal Immunochemical Occult Blood Test  Which is a home stool sample kit that is mailed or returned to the clinic lab. This test can detect hidden bleeding in the lower colon and should be done every year. Another option is Cologuard which also involves collecting a stool sample at home. This test kit is shipped directly to your home and returned from any UPS location. It can identify altered DNA shed in the stool associated with the possibility of colon cancer or precancerous lesions and should be done every 3 years. Both tests are easy to do and do not require any dietary or medication restrictions and involve only one collection sample. If a positive result is obtained from either of these tests, you would be referred for a colonoscopy.    If you are under/uninsured,  you may contact our Patient Financial Services Office at [phone] to determine which benefits you may qualify for. If you have completed either one of these tests or had a flexible sigmoidoscopy in the past five years at another facility, please have the records sent to our clinic so that we can best coordinate your care.  Please call us at [phone] if you have questions or would like arrange your colonoscopy procedure, consultation appointment, or discuss other options.     Sincerely;       [facility] Surgery Team

## 2018-10-09 NOTE — TELEPHONE ENCOUNTER
This patient has not responded to 3 messages that were left to schedule colonoscopy. A letter was sent. Referring provider notified.

## 2018-10-24 ENCOUNTER — OFFICE VISIT (OUTPATIENT)
Dept: FAMILY MEDICINE | Facility: OTHER | Age: 63
End: 2018-10-24
Attending: NURSE PRACTITIONER
Payer: COMMERCIAL

## 2018-10-24 VITALS
HEART RATE: 87 BPM | OXYGEN SATURATION: 98 % | SYSTOLIC BLOOD PRESSURE: 112 MMHG | TEMPERATURE: 97.5 F | HEIGHT: 65 IN | DIASTOLIC BLOOD PRESSURE: 84 MMHG | WEIGHT: 118 LBS | BODY MASS INDEX: 19.66 KG/M2 | RESPIRATION RATE: 20 BRPM

## 2018-10-24 DIAGNOSIS — J01.90 ACUTE SINUSITIS WITH SYMPTOMS > 10 DAYS: Primary | ICD-10-CM

## 2018-10-24 PROCEDURE — 99213 OFFICE O/P EST LOW 20 MIN: CPT | Performed by: NURSE PRACTITIONER

## 2018-10-24 RX ORDER — AZITHROMYCIN 250 MG/1
TABLET, FILM COATED ORAL
Qty: 6 TABLET | Refills: 0 | Status: SHIPPED | OUTPATIENT
Start: 2018-10-24 | End: 2018-10-29

## 2018-10-24 ASSESSMENT — ANXIETY QUESTIONNAIRES
6. BECOMING EASILY ANNOYED OR IRRITABLE: NOT AT ALL
3. WORRYING TOO MUCH ABOUT DIFFERENT THINGS: NOT AT ALL
2. NOT BEING ABLE TO STOP OR CONTROL WORRYING: NOT AT ALL
GAD7 TOTAL SCORE: 0
5. BEING SO RESTLESS THAT IT IS HARD TO SIT STILL: NOT AT ALL
1. FEELING NERVOUS, ANXIOUS, OR ON EDGE: NOT AT ALL
4. TROUBLE RELAXING: NOT AT ALL
7. FEELING AFRAID AS IF SOMETHING AWFUL MIGHT HAPPEN: NOT AT ALL

## 2018-10-24 ASSESSMENT — PAIN SCALES - GENERAL: PAINLEVEL: MILD PAIN (2)

## 2018-10-24 NOTE — PATIENT INSTRUCTIONS
Self-Care for Sinusitis     Drinking plenty of water can help sinuses drain.   Sinusitis can often be managed with self-care. Self-care can keep sinuses moist and make you feel more comfortable. Remember to follow your doctor's instructions closely. This can make a big difference in getting your sinus problem under control.  Drink fluids  Drinking extra fluids helps thin your mucus. This lets it drain from your sinuses more easily. Have a glass of water every hour or two. A humidifier helps in much the same way. Fluids can also offset the drying effects of certain medicines. If you use a humidifier, follow the product maker's instructions on how to use it. Clean it on a regular schedule.  Use saltwater rinses  Rinses help keep your sinuses and nose moist. Mix a teaspoon of salt in 8 ounces of fresh, warm water. Use a bulb syringe to gently squirt the water into your nose a few times a day. You can also buy ready-made saline nasal sprays.  Apply hot or cold packs  Applying heat to the area surrounding your sinuses may make you feel more comfortable. Use a hot water bottle or a hand towel dipped in hot water. Some people also find ice packs effective for relieving pain.  Medicines  Your doctor may prescribe medications to help treat your sinusitis. If you have an infection, antibiotics can help clear it up. If you are prescribed antibiotics, take all pills on schedule until they are gone, even if you feel better. Decongestants help relieve swelling. Use decongestant sprays for short periods only under the direction of your doctor. If you have allergies, your doctor may prescribe medications to help relieve them.   Date Last Reviewed: 10/1/2016    3280-9444 [employer]. [address] [place] [address]. All rights reserved. This information is not intended as a substitute for professional medical care. Always follow your healthcare professional's instructions.

## 2018-10-24 NOTE — PROGRESS NOTES
SUBJECTIVE:   Anne Marie Nix is a 63 year old female who presents to clinic today for the following health issues:      RESPIRATORY SYMPTOMS      Duration: 4 weeks    Description  nasal congestion, facial pain/pressure, cough, ear pain bilateral, fatigue/malaise and hoarse voice    Severity: moderate    Accompanying signs and symptoms: green snot, fatigue    History (predisposing factors):  none    Precipitating or alleviating factors: None    Therapies tried and outcome:  rest and fluids antihistamine works short term but not recovering          Problem list and histories reviewed & adjusted, as indicated.  Additional history: as documented    Patient Active Problem List   Diagnosis     Multiple respiratory allergies     ACP (advance care planning)     Attention deficit hyperactivity disorder (ADHD), predominantly inattentive type     History reviewed. No pertinent surgical history.    Social History   Substance Use Topics     Smoking status: Never Smoker     Smokeless tobacco: Never Used     Alcohol use No     Family History   Problem Relation Age of Onset     Allergies Mother      Breast Cancer Mother      Diabetes Mother      Cardiovascular Paternal Grandfather      Allergies Paternal Grandfather      Other - See Comments Father          Current Outpatient Prescriptions   Medication Sig Dispense Refill     methylphenidate (RITALIN) 10 MG tablet Take 1 tablet (10 mg) by mouth 3 times daily 90 tablet 0     pseudoePHEDrine (SUDAFED) 30 MG tablet Take 2 tablets by mouth every 4 hours as needed.       valACYclovir (VALTREX) 500 MG tablet TK 4 TS AT FIRST SIGN OF ATTACK AND 4 TS 12 HOURS LATER  0     Allergies   Allergen Reactions     Amoxicillin Trihydrate Other (See Comments)     Headache  Stomach aches  Augmentin         Clavulanic Acid Potassium Other (See Comments)     Headaches  Stomach aches  Augmentin         Reviewed and updated as needed this visit by clinical staff  Tobacco  Allergies  Meds  Med Hx  " Surg Hx  Fam Hx  Soc Hx      Reviewed and updated as needed this visit by Provider         ROS:  CONSTITUTIONAL:chills and fatigue  INTEGUMENTARY/SKIN: NEGATIVE for worrisome rashes, moles or lesions  EYES: watery and itchy eyes  ENT/MOUTH: earache both, sinus pressure, sore throat and tooth pain  RESP:occasional coughing  CV: NEGATIVE for chest pain, palpitations or peripheral edema    OBJECTIVE:     /84  Pulse 87  Temp 97.5  F (36.4  C) (Tympanic)  Resp 20  Ht 5' 5\" (1.651 m)  Wt 118 lb (53.5 kg)  SpO2 98%  BMI 19.64 kg/m2  Body mass index is 19.64 kg/(m^2).   GENERAL: alert and no distress  HENT: normal cephalic/atraumatic, both ears: clear effusion, nose and mouth without ulcers or lesions, oropharynx cobblestone appearance, oral mucous membranes moist and sinuses: maxillary, frontal tenderness on both sides  NECK: no adenopathy, no asymmetry, masses, or scars and thyroid normal to palpation  RESP: lungs clear to auscultation - no rales, rhonchi or wheezes  CV: regular rate and rhythm, normal S1 S2, no S3 or S4, no murmur, click or rub, no peripheral edema and peripheral pulses strong  ABDOMEN: soft, nontender, no hepatosplenomegaly, no masses and bowel sounds normal  SKIN: no suspicious lesions or rashes  PSYCH: mentation appears normal, affect normal/bright  LYMPH: no cervical adenopathy    Diagnostic Test Results:  none     ASSESSMENT/PLAN:     1. Acute sinusitis with symptoms > 10 days  With symptoms lasting about a month and minimal improvement plan to treat with antibiotic. She is encouraged to try nasal rinsing. She states she cannot use nasal steroids because they giver her nose bleeds. Discussed side effects and medication use.   - azithromycin (ZITHROMAX) 250 MG tablet; Two tablets first day, then one tablet daily for four days.  Dispense: 6 tablet; Refill: 0        Anne Marie agrees with plan of care   She should follow up if no improvement or worsening symptoms.   See Patient " Instructions    Kim Wheat, DEEPA [facility]

## 2018-10-24 NOTE — MR AVS SNAPSHOT
After Visit Summary   10/24/2018    Anne Marie Nix    MRN: [id number]           Patient Information     Date Of Birth          1955        Visit Information        Provider Department      10/24/2018 9:20 AM Kim Wheat APRN [facility] [place] [facility]        Today's Diagnoses     Acute sinusitis with symptoms > 10 days    -  1      Care Instructions      Self-Care for Sinusitis     Drinking plenty of water can help sinuses drain.   Sinusitis can often be managed with self-care. Self-care can keep sinuses moist and make you feel more comfortable. Remember to follow your doctor's instructions closely. This can make a big difference in getting your sinus problem under control.  Drink fluids  Drinking extra fluids helps thin your mucus. This lets it drain from your sinuses more easily. Have a glass of water every hour or two. A humidifier helps in much the same way. Fluids can also offset the drying effects of certain medicines. If you use a humidifier, follow the product maker's instructions on how to use it. Clean it on a regular schedule.  Use saltwater rinses  Rinses help keep your sinuses and nose moist. Mix a teaspoon of salt in 8 ounces of fresh, warm water. Use a bulb syringe to gently squirt the water into your nose a few times a day. You can also buy ready-made saline nasal sprays.  Apply hot or cold packs  Applying heat to the area surrounding your sinuses may make you feel more comfortable. Use a hot water bottle or a hand towel dipped in hot water. Some people also find ice packs effective for relieving pain.  Medicines  Your doctor may prescribe medications to help treat your sinusitis. If you have an infection, antibiotics can help clear it up. If you are prescribed antibiotics, take all pills on schedule until they are gone, even if you feel better. Decongestants help relieve swelling. Use decongestant sprays for short periods only under the direction of your  "doctor. If you have allergies, your doctor may prescribe medications to help relieve them.   Date Last Reviewed: 10/1/2016    9227-9674 [employer]. [address] [place] [address], [place] [address]. All rights reserved. This information is not intended as a substitute for professional medical care. Always follow your healthcare professional's instructions.                Follow-ups after your visit        Follow-up notes from your care team     Return if symptoms worsen or fail to improve.      Who to contact     If you have questions or need follow up information about today's clinic visit or your schedule please contact [facility] directly at [phone].  Normal or non-critical lab and imaging results will be communicated to you by MyChart, letter or phone within 4 business days after the clinic has received the results. If you do not hear from us within 7 days, please contact the clinic through MyChart or phone. If you have a critical or abnormal lab result, we will notify you by phone as soon as possible.  Submit refill requests through [employer] or call your pharmacy and they will forward the refill request to us. Please allow 3 business days for your refill to be completed.          Additional Information About Your Visit        Care EveryWhere ID     This is your Care EveryWhere ID. This could be used by other organizations to access your [facility] medical records  [id number]        Your Vitals Were     Pulse Temperature Respirations Height Pulse Oximetry BMI (Body Mass Index)    87 97.5  F (36.4  C) (Tympanic) 20 5' 5\" (1.651 m) 98% 19.64 kg/m2       Blood Pressure from Last 3 Encounters:   10/24/18 112/84   09/10/18 126/72   06/22/18 130/70    Weight from Last 3 Encounters:   10/24/18 118 lb (53.5 kg)   09/10/18 119 lb (54 kg)   06/22/18 128 lb (58.1 kg)              Today, you had the following     No orders found for display         Today's Medication Changes          These " changes are accurate as of 10/24/18  9:34 AM.  If you have any questions, ask your nurse or doctor.               Start taking these medicines.        Dose/Directions    azithromycin 250 MG tablet   Commonly known as:  ZITHROMAX   Used for:  Acute sinusitis with symptoms > 10 days   Started by:  Kim Wheat APRN CNP        Two tablets first day, then one tablet daily for four days.   Quantity:  6 tablet   Refills:  0            Where to get your medicines      These medications were sent to [id number] [place] [address]  AT [facility] [address]  [address] [place] [address]     Phone:  [phone]     azithromycin 250 MG tablet                Primary Care Provider Office Phone # Fax #    DEEPA Cobb [phone] [address] [place] [address]        Equal Access to Services     LESTER ALTMAN : Hadii aad ku hadasho Soomaali, waaxda luqadaha, qaybta kaalmada adeegyada, waxay idiin hayaan rashid khararolo jackson . So [facility] [phone].    ATENCIÓN: Si habla español, tiene a mcconnell disposición servicios gratuitos de asistencia lingüística. [facility] [phone].    We comply with applicable federal civil rights laws and [place] laws. We do not discriminate on the basis of race, color, national origin, age, disability, sex, sexual orientation, or gender identity.            Thank you!     Thank you for choosing [facility]  for your care. Our goal is always to provide you with excellent care. Hearing back from our patients is one way we can continue to improve our services. Please take a few minutes to complete the written survey that you may receive in the mail after your visit with us. Thank you!             Your Updated Medication List - Protect others around you: Learn how to safely use, store and throw away your medicines at www.disposemymeds.org.          This list is accurate as of  10/24/18  9:34 AM.  Always use your most recent med list.                   Brand Name Dispense Instructions for use Diagnosis    azithromycin 250 MG tablet    ZITHROMAX    6 tablet    Two tablets first day, then one tablet daily for four days.    Acute sinusitis with symptoms > 10 days       methylphenidate 10 MG tablet    RITALIN    90 tablet    Take 1 tablet (10 mg) by mouth 3 times daily    Attention deficit hyperactivity disorder (ADHD), predominantly inattentive type       SUDAFED 30 MG tablet   Generic drug:  pseudoePHEDrine      Take 2 tablets by mouth every 4 hours as needed.        valACYclovir 500 MG tablet    VALTREX     TK 4 TS AT FIRST SIGN OF ATTACK AND 4 TS 12 HOURS LATER

## 2018-10-24 NOTE — NURSING NOTE
"Chief Complaint   Patient presents with     Sinus Problem       Initial /84  Pulse 87  Temp 97.5  F (36.4  C) (Tympanic)  Resp 20  Ht 5' 5\" (1.651 m)  Wt 118 lb (53.5 kg)  SpO2 98%  BMI 19.64 kg/m2 Estimated body mass index is 19.64 kg/(m^2) as calculated from the following:    Height as of this encounter: 5' 5\" (1.651 m).    Weight as of this encounter: 118 lb (53.5 kg).  Medication Reconciliation: complete    Nataliia Wilson LPN    "

## 2018-10-25 ASSESSMENT — PATIENT HEALTH QUESTIONNAIRE - PHQ9: SUM OF ALL RESPONSES TO PHQ QUESTIONS 1-9: 7

## 2018-10-25 ASSESSMENT — ANXIETY QUESTIONNAIRES: GAD7 TOTAL SCORE: 0

## 2018-10-29 ENCOUNTER — TELEPHONE (OUTPATIENT)
Dept: FAMILY MEDICINE | Facility: OTHER | Age: 63
End: 2018-10-29

## 2018-10-29 DIAGNOSIS — J01.90 ACUTE SINUSITIS WITH SYMPTOMS > 10 DAYS: Primary | ICD-10-CM

## 2018-10-29 RX ORDER — LEVOFLOXACIN 500 MG/1
500 TABLET, FILM COATED ORAL DAILY
Qty: 10 TABLET | Refills: 0 | Status: SHIPPED | OUTPATIENT
Start: 2018-10-29 | End: 2019-07-31

## 2018-12-18 DIAGNOSIS — F90.0 ATTENTION DEFICIT HYPERACTIVITY DISORDER (ADHD), PREDOMINANTLY INATTENTIVE TYPE: ICD-10-CM

## 2018-12-18 NOTE — TELEPHONE ENCOUNTER
methylphenidate (RITALIN) 10 MG tablet   Last Written Prescription Date:  8/17/18  Last Fill Quantity: 90,   # refills: 0  Last Office Visit: 10/24/18  Future Office visit:       Routing refill request to provider for review/approval because:  Drug not on the [facility], P or [facility] refill protocol or controlled substance

## 2018-12-20 RX ORDER — METHYLPHENIDATE HYDROCHLORIDE 10 MG/1
10 TABLET ORAL 3 TIMES DAILY
Qty: 90 TABLET | Refills: 0 | Status: SHIPPED | OUTPATIENT
Start: 2018-12-20 | End: 2019-01-18

## 2019-01-18 DIAGNOSIS — F90.0 ATTENTION DEFICIT HYPERACTIVITY DISORDER (ADHD), PREDOMINANTLY INATTENTIVE TYPE: ICD-10-CM

## 2019-01-18 RX ORDER — METHYLPHENIDATE HYDROCHLORIDE 10 MG/1
10 TABLET ORAL 3 TIMES DAILY
Qty: 90 TABLET | Refills: 0 | Status: SHIPPED | OUTPATIENT
Start: 2019-01-18 | End: 2019-02-21

## 2019-01-18 NOTE — TELEPHONE ENCOUNTER
methylphenidate (RITALIN) 10 MG tablet  Last Written Prescription Date:  12/20/18  Last Fill Quantity: 90,   # refills: 0  Last Office Visit: 10/24/18  Future Office visit:       Routing refill request to provider for review/approval because:  Drug not on the [facility], P or [facility] refill protocol or controlled substance

## 2019-02-21 DIAGNOSIS — F90.0 ATTENTION DEFICIT HYPERACTIVITY DISORDER (ADHD), PREDOMINANTLY INATTENTIVE TYPE: ICD-10-CM

## 2019-02-21 RX ORDER — METHYLPHENIDATE HYDROCHLORIDE 10 MG/1
10 TABLET ORAL 3 TIMES DAILY
Qty: 90 TABLET | Refills: 0 | Status: SHIPPED | OUTPATIENT
Start: 2019-02-21 | End: 2019-03-22

## 2019-02-21 NOTE — TELEPHONE ENCOUNTER
methylphenidate (RITALIN) 10 MG tablet  Last Written Prescription Date:  1/18/19  Last Fill Quantity: 90,   # refills: 0  Last Office Visit: 10/24/18  Future Office visit:       Routing refill request to provider for review/approval because:  Drug not on the [facility], P or [facility] refill protocol or controlled substance

## 2019-03-21 DIAGNOSIS — F90.0 ATTENTION DEFICIT HYPERACTIVITY DISORDER (ADHD), PREDOMINANTLY INATTENTIVE TYPE: ICD-10-CM

## 2019-03-21 NOTE — TELEPHONE ENCOUNTER
methylphenidate (RITALIN) 10 MG tablet  Last Written Prescription Date:  2/21/19  Last Fill Quantity: 90,   # refills: 0  Last Office Visit: 10/24/18  Future Office visit:       Routing refill request to provider for review/approval because:  Drug not on the [facility], P or [facility] refill protocol or controlled substance

## 2019-03-22 RX ORDER — METHYLPHENIDATE HYDROCHLORIDE 10 MG/1
10 TABLET ORAL 3 TIMES DAILY
Qty: 90 TABLET | Refills: 0 | Status: SHIPPED | OUTPATIENT
Start: 2019-03-22 | End: 2019-04-23

## 2019-03-27 ENCOUNTER — TELEPHONE (OUTPATIENT)
Dept: FAMILY MEDICINE | Facility: OTHER | Age: 64
End: 2019-03-27

## 2019-04-19 DIAGNOSIS — F90.0 ATTENTION DEFICIT HYPERACTIVITY DISORDER (ADHD), PREDOMINANTLY INATTENTIVE TYPE: ICD-10-CM

## 2019-04-19 NOTE — TELEPHONE ENCOUNTER
methylphenidate (RITALIN) 10 MG tablet  Last Written Prescription Date:  3/22/19  Last Fill Quantity: 90,   # refills: 0  Last Office Visit: 10/24/18  Future Office visit:

## 2019-04-23 RX ORDER — METHYLPHENIDATE HYDROCHLORIDE 10 MG/1
10 TABLET ORAL 3 TIMES DAILY
Qty: 90 TABLET | Refills: 0 | Status: SHIPPED | OUTPATIENT
Start: 2019-04-23 | End: 2019-05-22

## 2019-05-01 DIAGNOSIS — F90.0 ATTENTION DEFICIT HYPERACTIVITY DISORDER (ADHD), PREDOMINANTLY INATTENTIVE TYPE: ICD-10-CM

## 2019-05-01 RX ORDER — METHYLPHENIDATE HYDROCHLORIDE 10 MG/1
10 TABLET ORAL 3 TIMES DAILY
Qty: 90 TABLET | Refills: 0 | OUTPATIENT
Start: 2019-05-01

## 2019-05-01 NOTE — TELEPHONE ENCOUNTER
Methylphenidate      Last Written Prescription Date:  4/23/19  Last Fill Quantity: 90,   # refills: 0  Last Office Visit: 10/24/18  Future Office visit:       Routing refill request to provider for review/approval because:

## 2019-05-21 ENCOUNTER — TELEPHONE (OUTPATIENT)
Dept: FAMILY MEDICINE | Facility: OTHER | Age: 64
End: 2019-05-21

## 2019-05-21 DIAGNOSIS — F90.0 ATTENTION DEFICIT HYPERACTIVITY DISORDER (ADHD), PREDOMINANTLY INATTENTIVE TYPE: ICD-10-CM

## 2019-05-21 NOTE — TELEPHONE ENCOUNTER
Ritalin 10 mg    Last Written Prescription Date:  04/23/19  Last Fill Quantity: 90,   # refills: 0  Last Office Visit: 10/24/18  Future Office visit:

## 2019-05-22 RX ORDER — METHYLPHENIDATE HYDROCHLORIDE 10 MG/1
10 TABLET ORAL 3 TIMES DAILY
Qty: 90 TABLET | Refills: 0 | Status: SHIPPED | OUTPATIENT
Start: 2019-05-22 | End: 2019-05-28

## 2019-05-28 RX ORDER — METHYLPHENIDATE HYDROCHLORIDE 10 MG/1
10 TABLET ORAL 3 TIMES DAILY
Qty: 90 TABLET | Refills: 0 | Status: SHIPPED | OUTPATIENT
Start: 2019-05-28 | End: 2019-06-26

## 2019-05-28 NOTE — TELEPHONE ENCOUNTER
Pt reports she has called a few times last week, reports script signed 5/22/19 not found. Script not at refill department.

## 2019-05-28 NOTE — TELEPHONE ENCOUNTER
Spoke with patient and confirmed that RX was signed by physician today.  RX will be at  registration for .  Patient will attempt to retrieve later today.

## 2019-06-25 DIAGNOSIS — F90.0 ATTENTION DEFICIT HYPERACTIVITY DISORDER (ADHD), PREDOMINANTLY INATTENTIVE TYPE: ICD-10-CM

## 2019-06-25 NOTE — TELEPHONE ENCOUNTER
methylphenidate (RITALIN) 10 MG tablet  Last Written Prescription Date:  5/28/19  Last Fill Quantity: 90,   # refills: 0  Last Office Visit: 10/24/18  Future Office visit:

## 2019-06-26 RX ORDER — METHYLPHENIDATE HYDROCHLORIDE 10 MG/1
10 TABLET ORAL 3 TIMES DAILY
Qty: 90 TABLET | Refills: 0 | Status: SHIPPED | OUTPATIENT
Start: 2019-06-26 | End: 2019-07-31

## 2019-06-26 NOTE — TELEPHONE ENCOUNTER
No further refills without an appointment   Due for ADHD appointment     Kim Wheat APRN FNP-BC  Family Nurse Practitioner

## 2019-06-27 NOTE — TELEPHONE ENCOUNTER
Pt informed that hard copy of script for ritalin is ready to be picked up at the . Also informed pt that per note below, pt is due for office visit. Pt will call back to schedule.

## 2019-07-11 NOTE — PROGRESS NOTES
"/84  Pulse 87  Temp 97.5  F (36.4  C) (Tympanic)  Resp 20  Ht 5' 5\" (1.651 m)  Wt 118 lb (53.5 kg)  SpO2 98%  BMI 19.64 kg/m2    " seizures at home

## 2019-07-25 NOTE — PROGRESS NOTES
Subjective     Anne Marie Nix is a 63 year old female who presents to clinic today for the following health issues:    HPI   ADD      Duration: life long    No problems with medications     Able to focus     Medication refill.       Scabbed areas around mouth.  Daughter recently diagnosed with impetigo         Patient Active Problem List   Diagnosis     Multiple respiratory allergies     ACP (advance care planning)     Attention deficit hyperactivity disorder (ADHD), predominantly inattentive type     History reviewed. No pertinent surgical history.    Social History     Tobacco Use     Smoking status: Never Smoker     Smokeless tobacco: Never Used   Substance Use Topics     Alcohol use: No     Family History   Problem Relation Age of Onset     Allergies Mother      Breast Cancer Mother      Diabetes Mother      Cardiovascular Paternal Grandfather      Allergies Paternal Grandfather      Other - See Comments Father          Current Outpatient Medications   Medication Sig Dispense Refill     methylphenidate (RITALIN) 10 MG tablet Take 1 tablet (10 mg) by mouth 3 times daily 90 tablet 0     pseudoePHEDrine (SUDAFED) 30 MG tablet Take 2 tablets by mouth every 4 hours as needed.       valACYclovir (VALTREX) 500 MG tablet TK 4 TS AT FIRST SIGN OF ATTACK AND 4 TS 12 HOURS LATER  0     Allergies   Allergen Reactions     Amoxicillin Trihydrate Other (See Comments)     Headache  Stomach aches  Augmentin         Clavulanic Acid Potassium Other (See Comments)     Headaches  Stomach aches  Augmentin           Reviewed and updated as needed this visit by Provider         Review of Systems   ROS COMP: CONSTITUTIONAL: NEGATIVE for fever, chills, change in weight  INTEGUMENTARY/SKIN: dry scabbed areas around mouth  RESP: NEGATIVE for significant cough or SOB  CV: NEGATIVE for chest pain, palpitations or peripheral edema  PSYCHIATRIC: hx ADHD      Objective    /62   Pulse 98   Temp 97.2  F (36.2  C) (Tympanic)   Resp 18   " Ht 1.651 m (5' 5\")   Wt 55.3 kg (122 lb)   SpO2 98%   BMI 20.30 kg/m    Body mass index is 20.3 kg/m .  Physical Exam   GENERAL: healthy, alert and no distress  NECK: no adenopathy, no asymmetry, masses, or scars and thyroid normal to palpation  RESP: lungs clear to auscultation - no rales, rhonchi or wheezes  CV: regular rate and rhythm, normal S1 S2, no S3 or S4, no murmur, click or rub, no peripheral edema and peripheral pulses strong  ABDOMEN: soft, nontender, no hepatosplenomegaly, no masses and bowel sounds normal  SKIN: scabbed areas around mouth  PSYCH: mentation appears normal, affect normal/bright    Diagnostic Test Results:  Labs reviewed in Epic  Results for orders placed or performed in visit on 09/10/18   A pap thin layer screen with  HPV - recommended age 30 - 65 years (select HPV order below)   Result Value Ref Range    PAP NIL     Copath Report         Patient Name: JORGE VAZQUEZ  MR#: [id number]  Specimen #: [id number]  Collected: 9/10/2018  Received: 9/10/2018  Reported: 9/13/2018 10:09  Ordering Phy(s): PIA RODRIGUEZ    For improved result formatting, select 'View Enhanced Report Format' under   Linked Documents section.    SPECIMEN/STAIN PROCESS:  Pap thin layer prep screening ([facility])       Pap-Cyto x 1, HPV ordered x 1    SOURCE: Cervical  ----------------------------------------------------------------   Pap thin layer prep screening ([facility])  SPECIMEN ADEQUACY:  Satisfactory for evaluation.  -Transformation zone component present.    CYTOLOGIC INTERPRETATION:    Negative for intraepithelial lesion or malignancy    Electronically signed out by:  TRISTAN Florian ( [facility])    Processed and screened at [facility],   [facility]    CLINICAL HISTORY:    Post Menopausal,    Papanicolaou Test Limitations:  Cervical cytology is a screening test with   limited sensitivity; r egular  screening is critical for cancer prevention; Pap tests " are primarily   effective for the diagnosis/prevention of  squamous cell carcinoma, not adenocarcinomas or other cancers.    TESTING LAB LOCATION:  [address] [address]  [phone]    COLLECTION SITE:  Client:  [facility]  Location: [facility] ([facility])     Lipid Profile (Chol, Trig, HDL, LDL calc)   Result Value Ref Range    Cholesterol 202 (H) <200 mg/dL    Triglycerides 32 <150 mg/dL    HDL Cholesterol 101 >49 mg/dL    LDL Cholesterol Calculated 95 <100 mg/dL    Non HDL Cholesterol 101 <130 mg/dL   Basic metabolic panel  (Ca, Cl, CO2, Creat, Gluc, K, Na, BUN)   Result Value Ref Range    Sodium 140 133 - 144 mmol/L    Potassium 4.2 3.4 - 5.3 mmol/L    Chloride 108 94 - 109 mmol/L    Carbon Dioxide 26 20 - 32 mmol/L    Anion Gap 6 3 - 14 mmol/L    Glucose 85 70 - 99 mg/dL    Urea Nitrogen 20 7 - 30 mg/dL    Creatinine 0.72 0.52 - 1.04 mg/dL    GFR Estimate 82 >60 mL/min/1.7m2    GFR Estimate If Black >90 >60 mL/min/1.7m2    Calcium 8.7 8.5 - 10.1 mg/dL   HPV High Risk Types DNA Cervical   Result Value Ref Range    HPV Source [facility]     HPV 16 DNA Negative NEG^Negative    HPV 18 DNA Negative NEG^Negative    Other HR HPV Negative NEG^Negative    Final Diagnosis This patient's sample is negative for HPV DNA.     Specimen Description Cervical Cells            Assessment & Plan     1. Recurrent cold sores  Continue current plan of care  - valACYclovir (VALTREX) 1000 mg tablet; TK 4 TS AT FIRST SIGN OF ATTACK AND 4 TS 12 HOURS LATER  Dispense: 12 tablet; Refill: 3    2. Impetigo  Discussed treatment plan. If topical does not work may need to do an oral antibiotic.  - mupirocin (BACTROBAN) 2 % external ointment; Apply topically 3 times daily  Dispense: 30 g; Refill: 0    3. Encounter for screening mammogram for breast cancer   due for screening  [facility]    4. Attention deficit hyperactivity disorder (ADHD),  predominantly inattentive type  Continue current plan of care  - methylphenidate (RITALIN) 10 MG tablet; Take 1 tablet (10 mg) by mouth 3 times daily  Dispense: 90 tablet; Refill: 0  - methylphenidate (RITALIN) 10 MG tablet; Take 1 tablet (10 mg) by mouth 3 times daily  Dispense: 90 tablet; Refill: 0  - methylphenidate (RITALIN) 10 MG tablet; Take 1 tablet (10 mg) by mouth 3 times daily  Dispense: 90 tablet; Refill: 0       Due for colon screening. Does not want to do the colonoscopy at this time, but agreed to cologuard.     See Patient Instructions    Return in about 3 months (around 10/31/2019) for ADHD.    DEEPA Jama [facility] - [facility]

## 2019-07-31 ENCOUNTER — OFFICE VISIT (OUTPATIENT)
Dept: FAMILY MEDICINE | Facility: OTHER | Age: 64
End: 2019-07-31
Attending: NURSE PRACTITIONER

## 2019-07-31 VITALS
DIASTOLIC BLOOD PRESSURE: 62 MMHG | BODY MASS INDEX: 20.33 KG/M2 | TEMPERATURE: 97.2 F | OXYGEN SATURATION: 98 % | SYSTOLIC BLOOD PRESSURE: 130 MMHG | RESPIRATION RATE: 18 BRPM | HEIGHT: 65 IN | HEART RATE: 98 BPM | WEIGHT: 122 LBS

## 2019-07-31 DIAGNOSIS — F90.0 ATTENTION DEFICIT HYPERACTIVITY DISORDER (ADHD), PREDOMINANTLY INATTENTIVE TYPE: ICD-10-CM

## 2019-07-31 DIAGNOSIS — Z12.31 ENCOUNTER FOR SCREENING MAMMOGRAM FOR BREAST CANCER: ICD-10-CM

## 2019-07-31 DIAGNOSIS — L01.00 IMPETIGO: ICD-10-CM

## 2019-07-31 DIAGNOSIS — B00.1 RECURRENT COLD SORES: Primary | ICD-10-CM

## 2019-07-31 PROCEDURE — 99213 OFFICE O/P EST LOW 20 MIN: CPT | Performed by: NURSE PRACTITIONER

## 2019-07-31 RX ORDER — METHYLPHENIDATE HYDROCHLORIDE 10 MG/1
10 TABLET ORAL 3 TIMES DAILY
Qty: 90 TABLET | Refills: 0 | Status: SHIPPED | OUTPATIENT
Start: 2019-07-31 | End: 2020-01-29

## 2019-07-31 RX ORDER — VALACYCLOVIR HYDROCHLORIDE 1 G/1
TABLET, FILM COATED ORAL
Qty: 12 TABLET | Refills: 3 | Status: SHIPPED | OUTPATIENT
Start: 2019-07-31 | End: 2019-08-01

## 2019-07-31 RX ORDER — METHYLPHENIDATE HYDROCHLORIDE 10 MG/1
10 TABLET ORAL 3 TIMES DAILY
Qty: 90 TABLET | Refills: 0 | Status: SHIPPED | OUTPATIENT
Start: 2019-07-31 | End: 2019-11-27

## 2019-07-31 RX ORDER — MUPIROCIN 20 MG/G
OINTMENT TOPICAL 3 TIMES DAILY
Qty: 30 G | Refills: 0 | Status: SHIPPED | OUTPATIENT
Start: 2019-07-31 | End: 2020-12-09

## 2019-07-31 RX ORDER — METHYLPHENIDATE HYDROCHLORIDE 10 MG/1
10 TABLET ORAL 3 TIMES DAILY
Qty: 90 TABLET | Refills: 0 | Status: SHIPPED | OUTPATIENT
Start: 2019-07-31 | End: 2019-10-29

## 2019-07-31 ASSESSMENT — PAIN SCALES - GENERAL: PAINLEVEL: NO PAIN (0)

## 2019-07-31 ASSESSMENT — MIFFLIN-ST. JEOR: SCORE: 1114.27

## 2019-07-31 NOTE — Clinical Note
Can we send in [facility] for Anne Marie?Kim Wheat APRN FNP-BCFamily Nurse Practitioner

## 2019-08-01 ENCOUNTER — TELEPHONE (OUTPATIENT)
Dept: FAMILY MEDICINE | Facility: OTHER | Age: 64
End: 2019-08-01

## 2019-08-01 DIAGNOSIS — L01.00 IMPETIGO: Primary | ICD-10-CM

## 2019-08-01 DIAGNOSIS — B00.1 RECURRENT COLD SORES: ICD-10-CM

## 2019-08-01 RX ORDER — VALACYCLOVIR HYDROCHLORIDE 1 G/1
TABLET, FILM COATED ORAL
Qty: 12 TABLET | Refills: 3 | Status: SHIPPED | OUTPATIENT
Start: 2019-08-01 | End: 2020-12-09

## 2019-08-01 RX ORDER — CEPHALEXIN 500 MG/1
500 CAPSULE ORAL 3 TIMES DAILY
Qty: 21 CAPSULE | Refills: 0 | Status: SHIPPED | OUTPATIENT
Start: 2019-08-01 | End: 2020-12-09

## 2019-08-01 NOTE — TELEPHONE ENCOUNTER
Patient called regarding the impetigo she diagnosed with last night. She is going to be around her small grandchildren this weekend and her daughter is also pregnant. She is requesting an antibiotic for this. Please call the patient when this is done.

## 2019-08-01 NOTE — NURSING NOTE
"Chief Complaint   Patient presents with     Attention Deficit Disorder       Initial /62   Pulse 98   Temp 97.2  F (36.2  C) (Tympanic)   Resp 18   Ht 1.651 m (5' 5\")   Wt 55.3 kg (122 lb)   SpO2 98%   BMI 20.30 kg/m   Estimated body mass index is 20.3 kg/m  as calculated from the following:    Height as of this encounter: 1.651 m (5' 5\").    Weight as of this encounter: 55.3 kg (122 lb).  Medication Reconciliation: complete   Florencia Stewart    "

## 2019-10-25 DIAGNOSIS — F90.0 ATTENTION DEFICIT HYPERACTIVITY DISORDER (ADHD), PREDOMINANTLY INATTENTIVE TYPE: ICD-10-CM

## 2019-10-25 NOTE — TELEPHONE ENCOUNTER
methylphenidate (RITALIN) 10 MG tablet  Last Written Prescription Date:  7/31/2019  Last Fill Quantity: 90,   # refills: 0  Last Office Visit: 7/31/2019  Future Office visit:

## 2019-10-29 RX ORDER — METHYLPHENIDATE HYDROCHLORIDE 10 MG/1
10 TABLET ORAL 3 TIMES DAILY
Qty: 90 TABLET | Refills: 0 | Status: SHIPPED | OUTPATIENT
Start: 2019-10-29 | End: 2020-01-03

## 2019-10-29 NOTE — TELEPHONE ENCOUNTER
Left message that the written RX, methylphenidate (RITALIN) 10 MG tablet, is ready at the [facility]  registration to be picked up.

## 2019-11-25 DIAGNOSIS — F90.0 ATTENTION DEFICIT HYPERACTIVITY DISORDER (ADHD), PREDOMINANTLY INATTENTIVE TYPE: ICD-10-CM

## 2019-11-25 NOTE — TELEPHONE ENCOUNTER
methylphenidate (RITALIN) 10 MG tablet ()  Last visit date with prescribing provider: 2019  Last refill date: 10-  Quantity: 90, Refills: 0    Ann Martinez LPN

## 2019-11-27 RX ORDER — METHYLPHENIDATE HYDROCHLORIDE 10 MG/1
10 TABLET ORAL 3 TIMES DAILY
Qty: 90 TABLET | Refills: 0 | Status: SHIPPED | OUTPATIENT
Start: 2019-11-27 | End: 2020-01-29

## 2019-12-31 DIAGNOSIS — F90.0 ATTENTION DEFICIT HYPERACTIVITY DISORDER (ADHD), PREDOMINANTLY INATTENTIVE TYPE: ICD-10-CM

## 2019-12-31 NOTE — TELEPHONE ENCOUNTER
methylphenidate (RITALIN) 10 MG tablet  Last visit date with prescribing provider: 7-  Last refill date: 11-  Quantity: 90, Refills: 0    Ann Martinez LPN

## 2020-01-03 RX ORDER — METHYLPHENIDATE HYDROCHLORIDE 10 MG/1
10 TABLET ORAL 3 TIMES DAILY
Qty: 90 TABLET | Refills: 0 | Status: SHIPPED | OUTPATIENT
Start: 2020-01-03 | End: 2020-01-29

## 2020-01-29 DIAGNOSIS — F90.0 ATTENTION DEFICIT HYPERACTIVITY DISORDER (ADHD), PREDOMINANTLY INATTENTIVE TYPE: ICD-10-CM

## 2020-01-29 RX ORDER — METHYLPHENIDATE HYDROCHLORIDE 10 MG/1
10 TABLET ORAL 3 TIMES DAILY
Qty: 90 TABLET | Refills: 0 | Status: SHIPPED | OUTPATIENT
Start: 2020-01-29 | End: 2020-03-02

## 2020-01-29 NOTE — TELEPHONE ENCOUNTER
methylphenidate (RITALIN) 10 MG tablet      Last Written Prescription Date:  1/3/20  Last Fill Quantity: 90,   # refills: 0  Last Office Visit: 7/31/19  Future Office visit:       Routing refill request to provider for review/approval because:  Drug not on the [facility], P or [facility] refill protocol or controlled substance

## 2020-02-21 NOTE — TELEPHONE ENCOUNTER
I'm not in [place] today and this was filled after hours, hard copy is in fax bin, my desk-black metal shelfing bottom level.  Could you get for patient, I thought this could be faxed.  Thanks Nataliia  
Patient notified that hard copy RX is ready at the [facility]  registration to be picked up.    
Prescription picked up by patient  ID Verified.  
Reason for call:  Medication    1. Medication Name? Vyvanse  2. Is this request for a refill? No  3. What Pharmacy do you use? [facility] [place]  4. Have you contacted your pharmacy? Yes    5. If yes, when?  (Please note that the turn-around-time for prescriptions is 72 business hours; I am sending your request at this time. SEND TO  [facility]  )  Description: Patient was prescribed Vyvanse on 9-12-17 by Kim Wheat and is inquiring where the prescription is?  Was an appointment offered for this a call? No   Preferred method for responding to this messageTelephone Call [phone]  If we cannot reach you directly, may we leave a detailed response at the number you provided? Yes  Can this message wait until your PCP/Provider returns if not available today? Yes    
We never received this hard copy in refill. Please advise patient on where hard copy is. Thank you  
0

## 2020-03-02 ENCOUNTER — TELEPHONE (OUTPATIENT)
Dept: FAMILY MEDICINE | Facility: OTHER | Age: 65
End: 2020-03-02

## 2020-03-02 DIAGNOSIS — F90.0 ATTENTION DEFICIT HYPERACTIVITY DISORDER (ADHD), PREDOMINANTLY INATTENTIVE TYPE: ICD-10-CM

## 2020-03-02 RX ORDER — METHYLPHENIDATE HYDROCHLORIDE 10 MG/1
10 TABLET ORAL 3 TIMES DAILY
Qty: 90 TABLET | Refills: 0 | Status: SHIPPED | OUTPATIENT
Start: 2020-03-02 | End: 2020-03-30

## 2020-03-02 NOTE — PROGRESS NOTES
Subjective     Anne Marie Nix is a 64 year old female who presents to clinic today for the following health issues:    HPI   Medication Followup of Ritalin for ADD    Taking Medication as prescribed: yes    Side Effects:  None    Medication Helping Symptoms:  yes   Due for mammogram, but unknown if insurance will cover  She will call once she checks into it       Patient Active Problem List   Diagnosis     Multiple respiratory allergies     ACP (advance care planning)     Attention deficit hyperactivity disorder (ADHD), predominantly inattentive type     No past surgical history on file.    Social History     Tobacco Use     Smoking status: Never Smoker     Smokeless tobacco: Never Used   Substance Use Topics     Alcohol use: No     Family History   Problem Relation Age of Onset     Allergies Mother      Breast Cancer Mother      Diabetes Mother      Cardiovascular Paternal Grandfather      Allergies Paternal Grandfather      Other - See Comments Father          Current Outpatient Medications   Medication Sig Dispense Refill     cephALEXin (KEFLEX) 500 MG capsule Take 1 capsule (500 mg) by mouth 3 times daily 21 capsule 0     methylphenidate (RITALIN) 10 MG tablet Take 1 tablet (10 mg) by mouth 3 times daily 90 tablet 0     mupirocin (BACTROBAN) 2 % external ointment Apply topically 3 times daily 30 g 0     pseudoePHEDrine (SUDAFED) 30 MG tablet Take 2 tablets by mouth every 4 hours as needed.       valACYclovir (VALTREX) 1000 mg tablet TK 4 TS AT FIRST SIGN OF ATTACK AND 4 TS 12 HOURS LATER 12 tablet 3     Allergies   Allergen Reactions     Amoxicillin Trihydrate Other (See Comments)     Headache  Stomach aches  Augmentin         Clavulanic Acid Potassium Other (See Comments)     Headaches  Stomach aches  Augmentin       BP Readings from Last 3 Encounters:   07/31/19 130/62   10/24/18 112/84   09/10/18 126/72    Wt Readings from Last 3 Encounters:   07/31/19 55.3 kg (122 lb)   10/24/18 53.5 kg (118 lb)  "  09/10/18 54 kg (119 lb)                 Reviewed and updated as needed this visit by Provider         Review of Systems   ROS COMP: CONSTITUTIONAL: NEGATIVE for fever, chills, change in weight  INTEGUMENTARY/SKIN: NEGATIVE for worrisome rashes, moles or lesions  RESP: NEGATIVE for significant cough or SOB  CV: NEGATIVE for chest pain, palpitations or peripheral edema  PSYCHIATRIC: HX ADD      Objective    /62   Pulse 90   Temp 96.1  F (35.6  C)   Ht 1.58 m (5' 2.21\")   Wt 58.1 kg (128 lb)   SpO2 99%   BMI 23.26 kg/m    Body mass index is 23.26 kg/m .  Physical Exam   GENERAL: healthy, alert and no distress  NECK: no adenopathy, no asymmetry, masses, or scars and thyroid normal to palpation  RESP: lungs clear to auscultation - no rales, rhonchi or wheezes  CV: regular rate and rhythm, normal S1 S2, no S3 or S4, no murmur, click or rub, no peripheral edema and peripheral pulses strong  ABDOMEN: soft, nontender, no hepatosplenomegaly, no masses and bowel sounds normal  SKIN: no suspicious lesions or rashes  PSYCH: mentation appears normal, affect normal/bright    Diagnostic Test Results:  Labs reviewed in Epic        Assessment & Plan     1. Encounter for immunization  Updated TDAP today  Declined shingles vaccine. She is encouraged to get vaccinated, but does not like to get shots and has concerns with immunizations.  She did agree to TDAP  - TDAP VACCINE  - ADMIN 1st VACCINE    2. Attention deficit hyperactivity disorder (ADHD), predominantly inattentive type  Continue current plan of care.  Can follow up every 6 months as she is stable and long term on same dose of medication     She is not sure her insurance will pay mammogram or colon screening. She will find out and let us know.         See Patient Instructions    Return in about 6 months (around 9/3/2020) for ADHD.    DEEPA Jama [facility] [place]      "

## 2020-03-02 NOTE — TELEPHONE ENCOUNTER
Called the patient and scheduled appointment for tomorrow as she will be out of her medication in 2 days.

## 2020-03-02 NOTE — TELEPHONE ENCOUNTER
Ritalin      Last Written Prescription Date:  01/29/20  Last Fill Quantity: 90,   # refills: 0  Last Office Visit: 07/31/19  Future Office visit:       Routing refill request to provider for review/approval because:  Drug not on the [facility], P or [facility] refill protocol or controlled substance

## 2020-03-03 ENCOUNTER — OFFICE VISIT (OUTPATIENT)
Dept: FAMILY MEDICINE | Facility: OTHER | Age: 65
End: 2020-03-03
Attending: NURSE PRACTITIONER
Payer: COMMERCIAL

## 2020-03-03 VITALS
HEIGHT: 62 IN | SYSTOLIC BLOOD PRESSURE: 124 MMHG | OXYGEN SATURATION: 99 % | DIASTOLIC BLOOD PRESSURE: 62 MMHG | BODY MASS INDEX: 23.55 KG/M2 | TEMPERATURE: 96.1 F | WEIGHT: 128 LBS | HEART RATE: 90 BPM

## 2020-03-03 DIAGNOSIS — F90.0 ATTENTION DEFICIT HYPERACTIVITY DISORDER (ADHD), PREDOMINANTLY INATTENTIVE TYPE: ICD-10-CM

## 2020-03-03 DIAGNOSIS — Z23 ENCOUNTER FOR IMMUNIZATION: Primary | ICD-10-CM

## 2020-03-03 PROCEDURE — 90715 TDAP VACCINE 7 YRS/> IM: CPT | Performed by: NURSE PRACTITIONER

## 2020-03-03 PROCEDURE — 90471 IMMUNIZATION ADMIN: CPT | Performed by: NURSE PRACTITIONER

## 2020-03-03 PROCEDURE — 99213 OFFICE O/P EST LOW 20 MIN: CPT | Mod: 25 | Performed by: NURSE PRACTITIONER

## 2020-03-03 ASSESSMENT — PAIN SCALES - GENERAL: PAINLEVEL: NO PAIN (0)

## 2020-03-03 ASSESSMENT — ANXIETY QUESTIONNAIRES
4. TROUBLE RELAXING: NOT AT ALL
5. BEING SO RESTLESS THAT IT IS HARD TO SIT STILL: NOT AT ALL
7. FEELING AFRAID AS IF SOMETHING AWFUL MIGHT HAPPEN: NOT AT ALL
3. WORRYING TOO MUCH ABOUT DIFFERENT THINGS: NOT AT ALL
2. NOT BEING ABLE TO STOP OR CONTROL WORRYING: NOT AT ALL
6. BECOMING EASILY ANNOYED OR IRRITABLE: NOT AT ALL
GAD7 TOTAL SCORE: 0
1. FEELING NERVOUS, ANXIOUS, OR ON EDGE: NOT AT ALL

## 2020-03-03 ASSESSMENT — MIFFLIN-ST. JEOR: SCORE: 1092.1

## 2020-03-03 ASSESSMENT — PATIENT HEALTH QUESTIONNAIRE - PHQ9: SUM OF ALL RESPONSES TO PHQ QUESTIONS 1-9: 0

## 2020-03-03 NOTE — NURSING NOTE
"Chief Complaint   Patient presents with     Medication Follow-up       Initial /62   Pulse 90   Temp 96.1  F (35.6  C)   Ht 1.58 m (5' 2.21\")   Wt 58.1 kg (128 lb)   SpO2 99%   BMI 23.26 kg/m   Estimated body mass index is 23.26 kg/m  as calculated from the following:    Height as of this encounter: 1.58 m (5' 2.21\").    Weight as of this encounter: 58.1 kg (128 lb).  Medication Reconciliation: complete  Harper Robison LPN    "

## 2020-03-04 ASSESSMENT — ANXIETY QUESTIONNAIRES: GAD7 TOTAL SCORE: 0

## 2020-03-30 DIAGNOSIS — F90.0 ATTENTION DEFICIT HYPERACTIVITY DISORDER (ADHD), PREDOMINANTLY INATTENTIVE TYPE: ICD-10-CM

## 2020-03-30 RX ORDER — METHYLPHENIDATE HYDROCHLORIDE 10 MG/1
10 TABLET ORAL 3 TIMES DAILY
Qty: 90 TABLET | Refills: 0 | Status: SHIPPED | OUTPATIENT
Start: 2020-03-30 | End: 2020-04-30

## 2020-03-30 NOTE — TELEPHONE ENCOUNTER
Ritalin      Last Written Prescription Date:  3/2/20  Last Fill Quantity: 90,   # refills: 0  Last Office Visit: 3/3/20  Future Office visit:       Routing refill request to provider for review/approval because:

## 2020-04-30 DIAGNOSIS — F90.0 ATTENTION DEFICIT HYPERACTIVITY DISORDER (ADHD), PREDOMINANTLY INATTENTIVE TYPE: ICD-10-CM

## 2020-04-30 RX ORDER — METHYLPHENIDATE HYDROCHLORIDE 10 MG/1
10 TABLET ORAL 3 TIMES DAILY
Qty: 90 TABLET | Refills: 0 | Status: SHIPPED | OUTPATIENT
Start: 2020-04-30 | End: 2020-05-29

## 2020-04-30 NOTE — TELEPHONE ENCOUNTER
Ritalin  Last Written Prescription Date: 3/30/20  Last Fill Quantity: 90 # of Refills: 0  Last Office Visit: 3/3/20

## 2020-05-29 DIAGNOSIS — F90.0 ATTENTION DEFICIT HYPERACTIVITY DISORDER (ADHD), PREDOMINANTLY INATTENTIVE TYPE: ICD-10-CM

## 2020-05-29 RX ORDER — METHYLPHENIDATE HYDROCHLORIDE 10 MG/1
10 TABLET ORAL 3 TIMES DAILY
Qty: 90 TABLET | Refills: 0 | Status: SHIPPED | OUTPATIENT
Start: 2020-05-29 | End: 2020-07-01

## 2020-05-29 NOTE — TELEPHONE ENCOUNTER
[facility] [place]    Ritalin  Last Written Prescription Date:  4.30.2020  Last Fill Quantity: 90  # refills: 0  Last Office Visit: 3.3.2020  Future Office visit:       Routing refill request to provider for review/approval because:  Drug not on the [facility], [facility] or [facility] refill protocol or controlled substance

## 2020-07-01 DIAGNOSIS — F90.0 ATTENTION DEFICIT HYPERACTIVITY DISORDER (ADHD), PREDOMINANTLY INATTENTIVE TYPE: ICD-10-CM

## 2020-07-01 RX ORDER — METHYLPHENIDATE HYDROCHLORIDE 10 MG/1
10 TABLET ORAL 3 TIMES DAILY
Qty: 90 TABLET | Refills: 0 | Status: SHIPPED | OUTPATIENT
Start: 2020-07-01 | End: 2020-07-30

## 2020-07-01 NOTE — TELEPHONE ENCOUNTER
methylphenidate (RITALIN) 10 MG tablet       Last Written Prescription Date:  5/29/20  Last Fill Quantity: 90,   # refills: 0  Last Office Visit: 3/3/20  Future Office visit:       Routing refill request to provider for review/approval because:  Drug not on the [facility], P or [facility] refill protocol or controlled substance

## 2020-07-30 DIAGNOSIS — F90.0 ATTENTION DEFICIT HYPERACTIVITY DISORDER (ADHD), PREDOMINANTLY INATTENTIVE TYPE: ICD-10-CM

## 2020-07-30 RX ORDER — METHYLPHENIDATE HYDROCHLORIDE 10 MG/1
10 TABLET ORAL 3 TIMES DAILY
Qty: 90 TABLET | Refills: 0 | Status: SHIPPED | OUTPATIENT
Start: 2020-07-30 | End: 2020-08-28

## 2020-07-30 NOTE — TELEPHONE ENCOUNTER
methylphenidate (RITALIN) 10 MG tablet       Last Written Prescription Date:  7/1/20  Last Fill Quantity: 90,   # refills: 0  Last Office Visit: 3/3/20  Future Office visit:       Routing refill request to provider for review/approval because:  Drug not on the [facility], P or [facility] refill protocol or controlled substance

## 2020-08-28 DIAGNOSIS — F90.0 ATTENTION DEFICIT HYPERACTIVITY DISORDER (ADHD), PREDOMINANTLY INATTENTIVE TYPE: ICD-10-CM

## 2020-08-28 RX ORDER — METHYLPHENIDATE HYDROCHLORIDE 10 MG/1
10 TABLET ORAL 3 TIMES DAILY
Qty: 90 TABLET | Refills: 0 | Status: SHIPPED | OUTPATIENT
Start: 2020-08-28 | End: 2020-09-29

## 2020-08-28 NOTE — TELEPHONE ENCOUNTER
Ritalin  Last Written Prescription Date:  7.30.2020  Last Fill Quantity: 90,   # refills: 0  Last Office Visit: 3.3.2020  Future Office visit:       Routing refill request to provider for review/approval because:  Drug not on the [facility], P or [facility] refill protocol or controlled substance

## 2020-09-29 DIAGNOSIS — F90.0 ATTENTION DEFICIT HYPERACTIVITY DISORDER (ADHD), PREDOMINANTLY INATTENTIVE TYPE: ICD-10-CM

## 2020-09-29 RX ORDER — METHYLPHENIDATE HYDROCHLORIDE 10 MG/1
10 TABLET ORAL 3 TIMES DAILY
Qty: 90 TABLET | Refills: 0 | Status: SHIPPED | OUTPATIENT
Start: 2020-09-29 | End: 2020-10-30

## 2020-09-29 NOTE — TELEPHONE ENCOUNTER
Ritalin 10mg  Last Written Prescription Date:  8/28/20  Last Fill Quantity: 90,   # refills: 0  Last Office Visit: 3/3/20  Future Office visit:

## 2020-10-09 ENCOUNTER — TELEPHONE (OUTPATIENT)
Dept: FAMILY MEDICINE | Facility: OTHER | Age: 65
End: 2020-10-09

## 2020-10-29 DIAGNOSIS — F90.0 ATTENTION DEFICIT HYPERACTIVITY DISORDER (ADHD), PREDOMINANTLY INATTENTIVE TYPE: ICD-10-CM

## 2020-10-29 NOTE — TELEPHONE ENCOUNTER
Ritalin 10 mg      Last Written Prescription Date:  09/29/2020  Last Fill Quantity: 90,   # refills: 0  Last Office Visit: 03/03/2020  Future Office visit:

## 2020-10-30 RX ORDER — METHYLPHENIDATE HYDROCHLORIDE 10 MG/1
10 TABLET ORAL 3 TIMES DAILY
Qty: 90 TABLET | Refills: 0 | Status: SHIPPED | OUTPATIENT
Start: 2020-10-30 | End: 2020-12-02

## 2020-10-30 NOTE — TELEPHONE ENCOUNTER
Left message for patient to make a follow up appointment for ADHD with Kim Wheat- okay to be a telephone appointment. Gracia Bell LPN

## 2020-10-30 NOTE — TELEPHONE ENCOUNTER
Gracia can we schedule Anne Marie for an appointment.  We can do her ADHD as a phone call    Kim ARENAS P-BC  Family Nurse Practitioner

## 2020-11-06 ENCOUNTER — TELEPHONE (OUTPATIENT)
Dept: FAMILY MEDICINE | Facility: OTHER | Age: 65
End: 2020-11-06

## 2020-11-06 DIAGNOSIS — Z12.31 ENCOUNTER FOR SCREENING MAMMOGRAM FOR BREAST CANCER: Primary | ICD-10-CM

## 2020-11-13 ENCOUNTER — TELEPHONE (OUTPATIENT)
Dept: FAMILY MEDICINE | Facility: OTHER | Age: 65
End: 2020-11-13

## 2020-12-01 DIAGNOSIS — F90.0 ATTENTION DEFICIT HYPERACTIVITY DISORDER (ADHD), PREDOMINANTLY INATTENTIVE TYPE: ICD-10-CM

## 2020-12-01 RX ORDER — METHYLPHENIDATE HYDROCHLORIDE 10 MG/1
10 TABLET ORAL 3 TIMES DAILY
Qty: 90 TABLET | Refills: 0 | OUTPATIENT
Start: 2020-12-01

## 2020-12-02 ENCOUNTER — TELEPHONE (OUTPATIENT)
Dept: FAMILY MEDICINE | Facility: OTHER | Age: 65
End: 2020-12-02

## 2020-12-02 DIAGNOSIS — F90.0 ATTENTION DEFICIT HYPERACTIVITY DISORDER (ADHD), PREDOMINANTLY INATTENTIVE TYPE: ICD-10-CM

## 2020-12-02 RX ORDER — METHYLPHENIDATE HYDROCHLORIDE 10 MG/1
10 TABLET ORAL 3 TIMES DAILY
Qty: 90 TABLET | Refills: 0 | Status: SHIPPED | OUTPATIENT
Start: 2020-12-02 | End: 2020-12-30

## 2020-12-02 NOTE — TELEPHONE ENCOUNTER
Patient called and set up appointment for ADHD follow up on Wed 12/09/2020. Patient is wondering if you would fill for a few days to last her up to that point as patient is out of medication. Please advise.  Patient would like a return call to inform her either way. Thank you

## 2020-12-08 NOTE — PROGRESS NOTES
"Subjective     Anne Marie Nix is a 65 year old female who presents to clinic today for the following health issues:    HPI     Due for colon screening- declined (\"I do not feel a need for that\")     Due of mammogram- is going through a divorce and once things settle down she will schedule.          ADHD Follow-Up    Date of last ADHD office visit: 3/03/20  Status since last visit: Stable  Taking controlled (daily) medications as prescribed: Yes                       Parent/Patient Concerns with Medications: None  ADHD Medication     Stimulants - Misc. Disp Start End     methylphenidate (RITALIN) 10 MG tablet    90 tablet 12/2/2020     Sig - Route: Take 1 tablet (10 mg) by mouth 3 times daily - Oral    Class: E-Prescribe    Earliest Fill Date: 12/2/2020          Sleep: no problems  Home/Family Concerns: going through a divorce. Moving     Co-Morbid Diagnosis: None    Currently in counseling: was about 3 months ago- not currently. Has great support in family      Medication side effects:  Side effects noted: none       PHQ 10/24/2018 3/3/2020 12/9/2020   PHQ-9 Total Score 7 0 0   Q9: Thoughts of better off dead/self-harm past 2 weeks Not at all Not at all Not at all     FABIANA-7 SCORE 10/24/2018 3/3/2020 12/9/2020   Total Score 0 0 0         Review of Systems   CONSTITUTIONAL: NEGATIVE for fever, chills, change in weight  INTEGUMENTARY/SKIN: NEGATIVE for worrisome rashes, moles or lesions  EYES: NEGATIVE for vision changes or irritation  ENT/MOUTH: NEGATIVE for ear, mouth and throat problems  RESP: NEGATIVE for significant cough or SOB  CV: NEGATIVE for chest pain, palpitations or peripheral edema  GI: NEGATIVE for nausea, abdominal pain, heartburn, or change in bowel habits  : denies dysuria   MUSCULOSKELETAL: NEGATIVE for significant arthralgias or myalgia  NEURO: NEGATIVE for weakness, dizziness or paresthesias  ENDOCRINE: NEGATIVE for temperature intolerance, skin/hair changes  PSYCHIATRIC: HX anxiety    "   Objective    /68   Pulse 84   Temp 97.5  F (36.4  C) (Tympanic)   Wt 54.4 kg (120 lb)   SpO2 98%   BMI 21.80 kg/m    Body mass index is 21.8 kg/m .  Physical Exam   GENERAL: healthy, alert and no distress  NECK: no adenopathy, no asymmetry, masses, or scars and thyroid normal to palpation  RESP: lungs clear to auscultation - no rales, rhonchi or wheezes  CV: regular rate and rhythm, normal S1 S2, no S3 or S4, no murmur, click or rub, no peripheral edema and peripheral pulses strong  ABDOMEN: soft, nontender, no hepatosplenomegaly, no masses and bowel sounds normal  SKIN: no suspicious lesions or rashes  NEURO: Normal strength and tone, mentation intact and speech normal  PSYCH: mentation appears normal, affect normal/bright  LYMPH: no cervical adenopathy    Results for orders placed or performed in visit on 12/09/20 (from the past 24 hour(s))   Basic metabolic panel   Result Value Ref [facility]    Sodium 140 133 - 144 mmol/L    Potassium 4.4 3.4 - 5.3 mmol/L    Chloride 109 94 - 109 mmol/L    Carbon Dioxide 28 20 - 32 mmol/L    Anion Gap 3 3 - 14 mmol/L    Glucose 93 70 - 99 mg/dL    Urea Nitrogen 24 7 - 30 mg/dL    Creatinine 0.73 0.52 - 1.04 mg/dL    GFR Estimate 86 >60 mL/min/[1.73_m2]    GFR Estimate If Black >90 >60 mL/min/[1.73_m2]    Calcium 8.9 8.5 - 10.1 mg/dL   TSH with free T4 reflex   Result Value Ref [facility]    TSH 1.63 0.40 - 4.00 mU/L   CBC with platelets and differential   Result Value Ref [facility]    WBC 4.8 4.0 - 11.0 10e9/L    RBC Count 4.56 3.8 - 5.2 10e12/L    Hemoglobin 13.6 11.7 - 15.7 g/dL    Hematocrit 42.8 35.0 - 47.0 %    MCV 94 78 - 100 fl    MCH 29.8 26.5 - 33.0 pg    MCHC 31.8 31.5 - 36.5 g/dL    RDW 13.1 10.0 - 15.0 %    Platelet Count 277 150 - 450 10e9/L    Diff Method Automated Method     % Neutrophils 42.4 %    % Lymphocytes 38.5 %    % Monocytes 11.2 %    % Eosinophils 5.2 %    % Basophils 2.5 %    % Immature Granulocytes 0.2 %    Nucleated RBCs 0 0 /100    Absolute Neutrophil  2.1 1.6 - 8.3 10e9/L    Absolute Lymphocytes 1.9 0.8 - 5.3 10e9/L    Absolute Monocytes 0.5 0.0 - 1.3 10e9/L    Absolute Eosinophils 0.3 0.0 - 0.7 10e9/L    Absolute Basophils 0.1 0.0 - 0.2 10e9/L    Abs Immature Granulocytes 0.0 0 - 0.4 10e9/L    Absolute Nucleated RBC 0.0            Assessment & Plan     Attention deficit hyperactivity disorder (ADHD), predominantly inattentive type  Continue with current plan of care  Should follow up in clinic at least every 6 months   - Basic metabolic panel  - TSH with free T4 reflex  - CBC with platelets and differential    Recurrent cold sores  Re-ordered medications   - valACYclovir (VALTREX) 1000 mg tablet; TK 4 TS AT FIRST SIGN OF ATTACK AND 4 TS 12 HOURS LATER    Impetigo  To use as needed   - mupirocin (BACTROBAN) 2 % external ointment; Apply topically 3 times daily        See Patient Instructions    No follow-ups on file.    DEEPA Jama [facility] [place]

## 2020-12-09 ENCOUNTER — TELEPHONE (OUTPATIENT)
Dept: FAMILY MEDICINE | Facility: OTHER | Age: 65
End: 2020-12-09

## 2020-12-09 ENCOUNTER — OFFICE VISIT (OUTPATIENT)
Dept: FAMILY MEDICINE | Facility: OTHER | Age: 65
End: 2020-12-09
Attending: NURSE PRACTITIONER
Payer: COMMERCIAL

## 2020-12-09 ENCOUNTER — APPOINTMENT (OUTPATIENT)
Dept: LAB | Facility: OTHER | Age: 65
End: 2020-12-09
Attending: NURSE PRACTITIONER
Payer: COMMERCIAL

## 2020-12-09 VITALS
SYSTOLIC BLOOD PRESSURE: 110 MMHG | TEMPERATURE: 97.5 F | WEIGHT: 120 LBS | BODY MASS INDEX: 21.8 KG/M2 | DIASTOLIC BLOOD PRESSURE: 68 MMHG | HEART RATE: 84 BPM | OXYGEN SATURATION: 98 %

## 2020-12-09 DIAGNOSIS — B00.1 RECURRENT COLD SORES: ICD-10-CM

## 2020-12-09 DIAGNOSIS — L01.00 IMPETIGO: ICD-10-CM

## 2020-12-09 DIAGNOSIS — F90.0 ATTENTION DEFICIT HYPERACTIVITY DISORDER (ADHD), PREDOMINANTLY INATTENTIVE TYPE: Primary | ICD-10-CM

## 2020-12-09 LAB
ANION GAP SERPL CALCULATED.3IONS-SCNC: 3 MMOL/L (ref 3–14)
BASOPHILS # BLD AUTO: 0.1 10E9/L (ref 0–0.2)
BASOPHILS NFR BLD AUTO: 2.5 %
BUN SERPL-MCNC: 24 MG/DL (ref 7–30)
CALCIUM SERPL-MCNC: 8.9 MG/DL (ref 8.5–10.1)
CHLORIDE SERPL-SCNC: 109 MMOL/L (ref 94–109)
CO2 SERPL-SCNC: 28 MMOL/L (ref 20–32)
CREAT SERPL-MCNC: 0.73 MG/DL (ref 0.52–1.04)
DIFFERENTIAL METHOD BLD: NORMAL
EOSINOPHIL # BLD AUTO: 0.3 10E9/L (ref 0–0.7)
EOSINOPHIL NFR BLD AUTO: 5.2 %
ERYTHROCYTE [DISTWIDTH] IN BLOOD BY AUTOMATED COUNT: 13.1 % (ref 10–15)
GFR SERPL CREATININE-BSD FRML MDRD: 86 ML/MIN/{1.73_M2}
GLUCOSE SERPL-MCNC: 93 MG/DL (ref 70–99)
HCT VFR BLD AUTO: 42.8 % (ref 35–47)
HGB BLD-MCNC: 13.6 G/DL (ref 11.7–15.7)
IMM GRANULOCYTES # BLD: 0 10E9/L (ref 0–0.4)
IMM GRANULOCYTES NFR BLD: 0.2 %
LYMPHOCYTES # BLD AUTO: 1.9 10E9/L (ref 0.8–5.3)
LYMPHOCYTES NFR BLD AUTO: 38.5 %
MCH RBC QN AUTO: 29.8 PG (ref 26.5–33)
MCHC RBC AUTO-ENTMCNC: 31.8 G/DL (ref 31.5–36.5)
MCV RBC AUTO: 94 FL (ref 78–100)
MONOCYTES # BLD AUTO: 0.5 10E9/L (ref 0–1.3)
MONOCYTES NFR BLD AUTO: 11.2 %
NEUTROPHILS # BLD AUTO: 2.1 10E9/L (ref 1.6–8.3)
NEUTROPHILS NFR BLD AUTO: 42.4 %
NRBC # BLD AUTO: 0 10*3/UL
NRBC BLD AUTO-RTO: 0 /100
PLATELET # BLD AUTO: 277 10E9/L (ref 150–450)
POTASSIUM SERPL-SCNC: 4.4 MMOL/L (ref 3.4–5.3)
RBC # BLD AUTO: 4.56 10E12/L (ref 3.8–5.2)
SODIUM SERPL-SCNC: 140 MMOL/L (ref 133–144)
TSH SERPL DL<=0.005 MIU/L-ACNC: 1.63 MU/L (ref 0.4–4)
WBC # BLD AUTO: 4.8 10E9/L (ref 4–11)

## 2020-12-09 PROCEDURE — 36415 COLL VENOUS BLD VENIPUNCTURE: CPT | Performed by: NURSE PRACTITIONER

## 2020-12-09 PROCEDURE — 99213 OFFICE O/P EST LOW 20 MIN: CPT | Performed by: NURSE PRACTITIONER

## 2020-12-09 PROCEDURE — 80048 BASIC METABOLIC PNL TOTAL CA: CPT | Performed by: NURSE PRACTITIONER

## 2020-12-09 PROCEDURE — 84443 ASSAY THYROID STIM HORMONE: CPT | Performed by: NURSE PRACTITIONER

## 2020-12-09 PROCEDURE — 85025 COMPLETE CBC W/AUTO DIFF WBC: CPT | Performed by: NURSE PRACTITIONER

## 2020-12-09 RX ORDER — VALACYCLOVIR HYDROCHLORIDE 1 G/1
TABLET, FILM COATED ORAL
Qty: 12 TABLET | Refills: 3 | Status: SHIPPED | OUTPATIENT
Start: 2020-12-09 | End: 2020-12-09

## 2020-12-09 RX ORDER — VALACYCLOVIR HYDROCHLORIDE 1 G/1
2000 TABLET, FILM COATED ORAL 2 TIMES DAILY
Qty: 12 TABLET | Refills: 3 | Status: SHIPPED | OUTPATIENT
Start: 2020-12-09 | End: 2022-11-23

## 2020-12-09 RX ORDER — MUPIROCIN 20 MG/G
OINTMENT TOPICAL 3 TIMES DAILY
Qty: 30 G | Refills: 0 | Status: SHIPPED | OUTPATIENT
Start: 2020-12-09 | End: 2022-03-18

## 2020-12-09 ASSESSMENT — ANXIETY QUESTIONNAIRES
GAD7 TOTAL SCORE: 0
4. TROUBLE RELAXING: NOT AT ALL
3. WORRYING TOO MUCH ABOUT DIFFERENT THINGS: NOT AT ALL
6. BECOMING EASILY ANNOYED OR IRRITABLE: NOT AT ALL
7. FEELING AFRAID AS IF SOMETHING AWFUL MIGHT HAPPEN: NOT AT ALL
5. BEING SO RESTLESS THAT IT IS HARD TO SIT STILL: NOT AT ALL
1. FEELING NERVOUS, ANXIOUS, OR ON EDGE: NOT AT ALL
2. NOT BEING ABLE TO STOP OR CONTROL WORRYING: NOT AT ALL

## 2020-12-09 ASSESSMENT — PAIN SCALES - GENERAL: PAINLEVEL: NO PAIN (0)

## 2020-12-09 ASSESSMENT — PATIENT HEALTH QUESTIONNAIRE - PHQ9: SUM OF ALL RESPONSES TO PHQ QUESTIONS 1-9: 0

## 2020-12-09 NOTE — NURSING NOTE
"Chief Complaint   Patient presents with     Recheck Medication       Initial /68   Pulse 84   Temp 97.5  F (36.4  C) (Tympanic)   Wt 54.4 kg (120 lb)   SpO2 98%   BMI 21.80 kg/m   Estimated body mass index is 21.8 kg/m  as calculated from the following:    Height as of 3/3/20: 1.58 m (5' 2.21\").    Weight as of this encounter: 54.4 kg (120 lb).  Medication Reconciliation: complete  Gracia Bell LPN  "

## 2020-12-09 NOTE — TELEPHONE ENCOUNTER
10:04 AM    Reason for Call: Clarification of Valtrex Order     Description: Call from [facility] on clarification of order for Valtrex received 12/9/20.     Pharmacy received to take 4 gms on first day and take another 4 gms 12 hours later, reporting this is a high dose so in need of clarification of accuracy.     Please return call to [facility]    Was an appointment offered for this call? No  If yes : Appointment type              Date    Preferred method for responding to this message: Telephone Call  What is your phone number - [phone]    If we cannot reach you directly, may we leave a detailed response at the number you provided? Yes    Can this message wait until your PCP/provider returns, if available today? Not applicable      Jeanette Dos Santos RN

## 2020-12-09 NOTE — LETTER
December 9, 2020      Anne Marie Nix  [address] [place] [address]        Dear ,    We are writing to inform you of your test results.    Your test results fall within the expected range(s) or remain unchanged from previous results.  Please continue with current treatment plan. Please follow up in 6 months, or earlier if you need anything. Thank you.     Resulted Orders   Basic metabolic panel   Result Value Ref [facility]    Sodium 140 133 - 144 mmol/L    Potassium 4.4 3.4 - 5.3 mmol/L    Chloride 109 94 - 109 mmol/L    Carbon Dioxide 28 20 - 32 mmol/L    Anion Gap 3 3 - 14 mmol/L    Glucose 93 70 - 99 mg/dL    Urea Nitrogen 24 7 - 30 mg/dL    Creatinine 0.73 0.52 - 1.04 mg/dL    GFR Estimate 86 >60 mL/min/[1.73_m2]      Comment:      Non  GFR Calc  Starting 12/18/2018, serum creatinine based estimated GFR (eGFR) will be   calculated using the Chronic Kidney Disease Epidemiology Collaboration   (CKD-EPI) equation.      GFR Estimate If Black >90 >60 mL/min/[1.73_m2]      Comment:       GFR Calc  Starting 12/18/2018, serum creatinine based estimated GFR (eGFR) will be   calculated using the Chronic Kidney Disease Epidemiology Collaboration   (CKD-EPI) equation.      Calcium 8.9 8.5 - 10.1 mg/dL   TSH with free T4 reflex   Result Value Ref [facility]    TSH 1.63 0.40 - 4.00 mU/L   CBC with platelets and differential   Result Value Ref [facility]    WBC 4.8 4.0 - 11.0 10e9/L    RBC Count 4.56 3.8 - 5.2 10e12/L    Hemoglobin 13.6 11.7 - 15.7 g/dL    Hematocrit 42.8 35.0 - 47.0 %    MCV 94 78 - 100 fl    MCH 29.8 26.5 - 33.0 pg    MCHC 31.8 31.5 - 36.5 g/dL    RDW 13.1 10.0 - 15.0 %    Platelet Count 277 150 - 450 10e9/L    Diff Method Automated Method     % Neutrophils 42.4 %    % Lymphocytes 38.5 %    % Monocytes 11.2 %    % Eosinophils 5.2 %    % Basophils 2.5 %    % Immature Granulocytes 0.2 %    Nucleated RBCs 0 0 /100    Absolute Neutrophil 2.1 1.6 - 8.3 10e9/L    Absolute  Lymphocytes 1.9 0.8 - 5.3 10e9/L    Absolute Monocytes 0.5 0.0 - 1.3 10e9/L    Absolute Eosinophils 0.3 0.0 - 0.7 10e9/L    Absolute Basophils 0.1 0.0 - 0.2 10e9/L    Abs Immature Granulocytes 0.0 0 - 0.4 10e9/L    Absolute Nucleated RBC 0.0        If you have any questions or concerns, please call the clinic at the number listed above.       Sincerely,      DEEPA Cobb CNP

## 2020-12-10 ASSESSMENT — ANXIETY QUESTIONNAIRES: GAD7 TOTAL SCORE: 0

## 2020-12-30 DIAGNOSIS — F90.0 ATTENTION DEFICIT HYPERACTIVITY DISORDER (ADHD), PREDOMINANTLY INATTENTIVE TYPE: ICD-10-CM

## 2020-12-30 RX ORDER — METHYLPHENIDATE HYDROCHLORIDE 10 MG/1
10 TABLET ORAL 3 TIMES DAILY
Qty: 90 TABLET | Refills: 0 | Status: SHIPPED | OUTPATIENT
Start: 2020-12-30 | End: 2021-02-01

## 2020-12-30 NOTE — TELEPHONE ENCOUNTER
methylphenidate (RITALIN) 10 MG tablet      Last Written Prescription Date:  12/2/20  Last Fill Quantity: 90,   # refills: 0  Last Office Visit: 12/9/20  Future Office visit:       Routing refill request to provider for review/approval because:  Drug not on the [facility], P or [facility] refill protocol or controlled substance

## 2021-02-01 DIAGNOSIS — F90.0 ATTENTION DEFICIT HYPERACTIVITY DISORDER (ADHD), PREDOMINANTLY INATTENTIVE TYPE: ICD-10-CM

## 2021-02-01 RX ORDER — METHYLPHENIDATE HYDROCHLORIDE 10 MG/1
10 TABLET ORAL 3 TIMES DAILY
Qty: 90 TABLET | Refills: 0 | Status: SHIPPED | OUTPATIENT
Start: 2021-02-01 | End: 2021-02-04

## 2021-02-01 NOTE — TELEPHONE ENCOUNTER
methylphenidate (RITALIN) 10 MG tablet    Last Written Prescription Date:  12/30/20  Last Fill Quantity: 90,   # refills: 0  Last Office Visit: 12/9/20  Future Office visit:

## 2021-02-04 ENCOUNTER — TELEPHONE (OUTPATIENT)
Dept: FAMILY MEDICINE | Facility: OTHER | Age: 66
End: 2021-02-04

## 2021-02-04 DIAGNOSIS — F90.0 ATTENTION DEFICIT HYPERACTIVITY DISORDER (ADHD), PREDOMINANTLY INATTENTIVE TYPE: ICD-10-CM

## 2021-02-04 RX ORDER — METHYLPHENIDATE HYDROCHLORIDE 10 MG/1
10 TABLET ORAL 3 TIMES DAILY
Qty: 90 TABLET | Refills: 0 | Status: SHIPPED | OUTPATIENT
Start: 2021-02-04 | End: 2021-03-05

## 2021-02-04 NOTE — TELEPHONE ENCOUNTER
[facility] in [place] received a refill for the patients Ritalin, and some how it got deleted. They would like a new RX sent.

## 2021-03-05 DIAGNOSIS — F90.0 ATTENTION DEFICIT HYPERACTIVITY DISORDER (ADHD), PREDOMINANTLY INATTENTIVE TYPE: ICD-10-CM

## 2021-03-05 RX ORDER — METHYLPHENIDATE HYDROCHLORIDE 10 MG/1
10 TABLET ORAL 3 TIMES DAILY
Qty: 90 TABLET | Refills: 0 | Status: SHIPPED | OUTPATIENT
Start: 2021-03-05 | End: 2021-04-06

## 2021-03-05 NOTE — TELEPHONE ENCOUNTER
Ritalin 10  mg      Last Written Prescription Date:  2/4/21  Last Fill Quantity: 90,   # refills: 0  Last Office Visit: 12/9/20  Future Office visit:       Routing refill request to provider for review/approval because:

## 2021-03-05 NOTE — TELEPHONE ENCOUNTER
She is due for visit for ADHD - it can be a phone visit     Kim ARENAS FNP-BC  Family Nurse Practitioner

## 2021-03-05 NOTE — TELEPHONE ENCOUNTER
Left message for patient to schedule an appointment for ADHD. Appointment can be either in clinic or phone visit.

## 2021-03-29 ENCOUNTER — TELEPHONE (OUTPATIENT)
Dept: FAMILY MEDICINE | Facility: OTHER | Age: 66
End: 2021-03-29

## 2021-03-29 NOTE — TELEPHONE ENCOUNTER
Patient called stating that she found a lump in her right breast today and she would like a referral for a mammogram.  If provider wants her to be seen before or after please let her know.  Call her and let her know when this is done.

## 2021-03-29 NOTE — PROGRESS NOTES
Assessment & Plan     Encounter for screening mammogram for breast cancer  She is due for mammogram and now she has noted a lump in the right lateral lower breast about the size of a pea.  Order for mammogram and US placed.  She has a family history of breast cancer.    - MA SCREENING DIGITAL BILATERAL ([place]); Future  - US Breast Right Limited 1-3 Quadrants; Future    Breast lump on right side at 7 o'clock position  - MA SCREENING DIGITAL BILATERAL ([place]); Future  - US Breast Right Limited 1-3 Quadrants; Future    Attention deficit hyperactivity disorder (ADHD), predominantly inattentive type  Stable continue current dose   Follow up in 3 months - can be a phone visit       Due for colon screening. She is waiting until she has medicare in place     See Patient Instructions    No follow-ups on file.    DEEPA Jama [facility] [place]    Cori Kenney is a 65 year old who presents for the following health issues     HPI     Breast Concern  Onset/Duration: Noticed yesterday  Description:   Location: right breast lower outer quadrant, below nipple  Pain or tenderness: YES- pain/odd feeling down right arm  Redness:  no   Intensity: mild  Progression of Symptoms: same  Accompanying Signs & Symptoms:  Any lumps in axillary region: no  Movable: YES  Nipple discharge: none  Changes in the skin or nipple: none  On Hormone therapy:  no   Does it change with menstrual cycle:  no   Previous history of similar problem: none  First degree relative with breast cancer: a positive family history for breast cancer in her mother.  Precipitating factors:           Worsened by: none  Alleviating factors:            Improved by: none  Therapies tried and outcome: None    No LMP recorded. Patient is postmenopausal.  Mother had breast cancer at age 70        Review of Systems   CONSTITUTIONAL:chills  INTEGUMENTARY/SKIN: NEGATIVE for worrisome rashes, moles or lesions  RESP:NEGATIVE for  significant cough or SOB  BREAST: right lateral breast lump and discomfort  CV: NEGATIVE for chest pain, palpitations or peripheral edema  MUSCULOSKELETAL: back pain  NEURO: NEGATIVE for weakness, dizziness or paresthesias  PSYCHIATRIC: ADHD      Objective    /78 (BP Location: Right arm, Patient Position: Sitting, Cuff Size: Adult Regular)   Pulse 86   Temp 97  F (36.1  C) (Tympanic)   Resp 18   Wt 54 kg (119 lb)   SpO2 98%   BMI 21.62 kg/m    Body mass index is 21.62 kg/m .  Physical Exam   GENERAL: healthy, alert and no distress  NECK: no adenopathy, no asymmetry, masses, or scars and thyroid normal to palpation  RESP: lungs clear to auscultation - no rales, rhonchi or wheezes  BREAST: no palpable axillary masses or adenopathy, mass right breast between 7-8 oclock position and tenderness right lateral breast - lump about 0.5 cm round  CV: regular rate and rhythm, normal S1 S2, no S3 or S4, no murmur, click or rub, no peripheral edema and peripheral pulses strong  SKIN: no suspicious lesions or rashes  PSYCH: mentation appears normal and anxious    Mammogram and US ordered

## 2021-03-30 ENCOUNTER — OFFICE VISIT (OUTPATIENT)
Dept: FAMILY MEDICINE | Facility: OTHER | Age: 66
End: 2021-03-30
Attending: NURSE PRACTITIONER
Payer: COMMERCIAL

## 2021-03-30 VITALS
BODY MASS INDEX: 21.62 KG/M2 | TEMPERATURE: 97 F | DIASTOLIC BLOOD PRESSURE: 78 MMHG | HEART RATE: 86 BPM | RESPIRATION RATE: 18 BRPM | OXYGEN SATURATION: 98 % | WEIGHT: 119 LBS | SYSTOLIC BLOOD PRESSURE: 130 MMHG

## 2021-03-30 DIAGNOSIS — N63.13 BREAST LUMP ON RIGHT SIDE AT 7 O'CLOCK POSITION: ICD-10-CM

## 2021-03-30 DIAGNOSIS — F90.0 ATTENTION DEFICIT HYPERACTIVITY DISORDER (ADHD), PREDOMINANTLY INATTENTIVE TYPE: ICD-10-CM

## 2021-03-30 DIAGNOSIS — Z12.31 ENCOUNTER FOR SCREENING MAMMOGRAM FOR BREAST CANCER: Primary | ICD-10-CM

## 2021-03-30 PROCEDURE — 99213 OFFICE O/P EST LOW 20 MIN: CPT | Performed by: NURSE PRACTITIONER

## 2021-03-30 ASSESSMENT — PAIN SCALES - GENERAL: PAINLEVEL: NO PAIN (0)

## 2021-03-30 NOTE — NURSING NOTE
"Chief Complaint   Patient presents with     Breast Problem       Initial /78 (BP Location: Right arm, Patient Position: Sitting, Cuff Size: Adult Regular)   Pulse 86   Temp 97  F (36.1  C) (Tympanic)   Resp 18   Wt 54 kg (119 lb)   SpO2 98%   BMI 21.62 kg/m   Estimated body mass index is 21.62 kg/m  as calculated from the following:    Height as of 3/3/20: 1.58 m (5' 2.21\").    Weight as of this encounter: 54 kg (119 lb).  Medication Reconciliation: complete  Kamlesh Patton LPN  "

## 2021-03-30 NOTE — PATIENT INSTRUCTIONS
Patient Education     Breast Lump, Uncertain Cause    A lump was found in your breast. Most breast lumps are not cancer. They may be caused by normal changes in the breast tissue due to hormone variations that occur with your menstrual cycle. Some women may form lumps that are painful and tender. Others may form lumps that are painless.   At this time, it's not possible to know what caused your lump without further evaluation. This could include:     Another exam by your healthcare provider or a gynecologist    Imaging tests, such as a mammogram or ultrasound    Biopsy (procedure to remove small tissue samples from the breast lump)  Your healthcare provider will explain any additional testing that is needed. Be sure to get answers to any questions you may have.   Home care  Until a diagnosis is made, you may be advised to do the following:    If you are having breast pain:  ? Take an over-the-counter pain reliever, if directed to by your provider.  ? Wear a well-fitted bra or sports bra for extra support. If you have breast pain at night, try wearing the bra during sleep.  ? Apply a warm compress (towel soaked in warm water) to the breast. You may also use a hot water bottle.    Check your breasts each day. Keep a log of whether the lump seems to be changing in size or tenderness with your period. This can help your healthcare provider make the correct diagnosis.  Follow-up care  Follow up with your healthcare provider, or as directed. Keep all appointments. Also, get ready for any upcoming tests as directed.   When to get medical advice  Call your healthcare provider right away if any of these occur:    Fever of 100.4 F (38 C) or higher, or as directed    Redness or swelling of the breast    Discharge from the nipple    Visible changes in the skin over the nipple or breast    Lump grows larger, feels very hard, or has an irregular shape    New lumps form  Jyoti last reviewed this educational content on  5/1/2020 2000-2020 [employer]. All rights reserved. This information is not intended as a substitute for professional medical care. Always follow your healthcare professional's instructions.

## 2021-04-01 ENCOUNTER — NURSE TRIAGE (OUTPATIENT)
Dept: FAMILY MEDICINE | Facility: OTHER | Age: 66
End: 2021-04-01

## 2021-04-01 NOTE — TELEPHONE ENCOUNTER
Spoke with patient she does not want to come in to be seen she will wait until provider is in clinic tomorrow she states she will call back to have provider prescribe ABX without her needing to be seen.  Lucie Donahue LPN

## 2021-04-01 NOTE — TELEPHONE ENCOUNTER
Patient was just seen by provider for a lump in her breast.  States she did tell the provider that she might have a sinus infection. Her ears have been tingling and eyes have pressure with a headache.  Blowing yellow and green from the nose. She thinks she has a sinus infection and is wondering if she needs to come in again or if she can have an antibiotic prescribed  Please call her and let her know.    [phone]

## 2021-04-06 DIAGNOSIS — F90.0 ATTENTION DEFICIT HYPERACTIVITY DISORDER (ADHD), PREDOMINANTLY INATTENTIVE TYPE: ICD-10-CM

## 2021-04-06 RX ORDER — METHYLPHENIDATE HYDROCHLORIDE 10 MG/1
10 TABLET ORAL 3 TIMES DAILY
Qty: 90 TABLET | Refills: 0 | Status: SHIPPED | OUTPATIENT
Start: 2021-04-06 | End: 2021-05-07

## 2021-04-06 NOTE — TELEPHONE ENCOUNTER
ritalin      Last Written Prescription Date:  3/5/21  Last Fill Quantity: 90,   # refills: 0  Last Office Visit: 3/30/21  Future Office visit:       Routing refill request to provider for review/approval because:  Drug not on the [facility], [facility] or [facility] refill protocol or controlled substance

## 2021-04-09 ENCOUNTER — HOSPITAL ENCOUNTER (OUTPATIENT)
Dept: ULTRASOUND IMAGING | Facility: HOSPITAL | Age: 66
End: 2021-04-09
Attending: NURSE PRACTITIONER
Payer: COMMERCIAL

## 2021-04-09 ENCOUNTER — HOSPITAL ENCOUNTER (OUTPATIENT)
Dept: MAMMOGRAPHY | Facility: OTHER | Age: 66
End: 2021-04-09
Attending: NURSE PRACTITIONER
Payer: COMMERCIAL

## 2021-04-09 DIAGNOSIS — N63.13 BREAST LUMP ON RIGHT SIDE AT 7 O'CLOCK POSITION: ICD-10-CM

## 2021-04-09 DIAGNOSIS — Z12.31 ENCOUNTER FOR SCREENING MAMMOGRAM FOR BREAST CANCER: ICD-10-CM

## 2021-04-09 PROCEDURE — 77066 DX MAMMO INCL CAD BI: CPT | Mod: TC | Performed by: RADIOLOGY

## 2021-04-09 PROCEDURE — G0279 TOMOSYNTHESIS, MAMMO: HCPCS | Mod: TC | Performed by: RADIOLOGY

## 2021-04-09 PROCEDURE — 76642 ULTRASOUND BREAST LIMITED: CPT | Mod: RT

## 2021-04-12 ENCOUNTER — TELEPHONE (OUTPATIENT)
Dept: INTERVENTIONAL RADIOLOGY/VASCULAR | Facility: HOSPITAL | Age: 66
End: 2021-04-12

## 2021-04-12 RX ORDER — LIDOCAINE HYDROCHLORIDE 10 MG/ML
10 INJECTION, SOLUTION EPIDURAL; INFILTRATION; INTRACAUDAL; PERINEURAL ONCE
Status: CANCELLED | OUTPATIENT
Start: 2021-04-13

## 2021-04-13 ENCOUNTER — HOSPITAL ENCOUNTER (OUTPATIENT)
Dept: ULTRASOUND IMAGING | Facility: HOSPITAL | Age: 66
End: 2021-04-13
Attending: SURGERY | Admitting: RADIOLOGY
Payer: COMMERCIAL

## 2021-04-13 ENCOUNTER — HOSPITAL ENCOUNTER (OUTPATIENT)
Facility: HOSPITAL | Age: 66
Discharge: HOME | End: 2021-04-13
Attending: RADIOLOGY | Admitting: RADIOLOGY
Payer: COMMERCIAL

## 2021-04-13 VITALS — SYSTOLIC BLOOD PRESSURE: 148 MMHG | DIASTOLIC BLOOD PRESSURE: 95 MMHG | HEART RATE: 102 BPM | OXYGEN SATURATION: 100 %

## 2021-04-13 DIAGNOSIS — N63.13 MASS OF LOWER OUTER QUADRANT OF RIGHT BREAST: ICD-10-CM

## 2021-04-13 PROCEDURE — 88342 IMHCHEM/IMCYTCHM 1ST ANTB: CPT | Mod: TC,59 | Performed by: RADIOLOGY

## 2021-04-13 PROCEDURE — 88305 TISSUE EXAM BY PATHOLOGIST: CPT | Mod: TC | Performed by: RADIOLOGY

## 2021-04-13 PROCEDURE — 272N000414 US BREAST BIOPSY CORE NEEDLE RIGHT

## 2021-04-13 PROCEDURE — 999N001019 HC STATISTIC H-FISH PROCESS B/S: Performed by: RADIOLOGY

## 2021-04-13 PROCEDURE — 999N001020 HC STATISTIC H-SEND OUTS PREP: Performed by: RADIOLOGY

## 2021-04-13 PROCEDURE — 250N000009 HC RX 250

## 2021-04-13 PROCEDURE — 88360 TUMOR IMMUNOHISTOCHEM/MANUAL: CPT | Mod: TC | Performed by: RADIOLOGY

## 2021-04-13 PROCEDURE — 88377 M/PHMTRC ALYS ISHQUANT/SEMIQ: CPT | Mod: TC | Performed by: RADIOLOGY

## 2021-04-13 PROCEDURE — 88341 IMHCHEM/IMCYTCHM EA ADD ANTB: CPT | Mod: TC | Performed by: RADIOLOGY

## 2021-04-13 RX ORDER — LIDOCAINE HYDROCHLORIDE 10 MG/ML
INJECTION, SOLUTION EPIDURAL; INFILTRATION; INTRACAUDAL; PERINEURAL
Status: COMPLETED
Start: 2021-04-13 | End: 2021-04-13

## 2021-04-13 RX ORDER — LIDOCAINE HYDROCHLORIDE 10 MG/ML
10 INJECTION, SOLUTION EPIDURAL; INFILTRATION; INTRACAUDAL; PERINEURAL ONCE
Status: COMPLETED | OUTPATIENT
Start: 2021-04-13 | End: 2021-04-13

## 2021-04-13 RX ORDER — LIDOCAINE HYDROCHLORIDE 10 MG/ML
INJECTION, SOLUTION EPIDURAL; INFILTRATION; INTRACAUDAL; PERINEURAL
Status: DISPENSED
Start: 2021-04-13 | End: 2021-04-13

## 2021-04-13 RX ADMIN — LIDOCAINE HYDROCHLORIDE,EPINEPHRINE BITARTRATE 5 ML: 20; .01 INJECTION, SOLUTION INFILTRATION; PERINEURAL at 10:27

## 2021-04-13 RX ADMIN — LIDOCAINE HYDROCHLORIDE 6 ML: 10 INJECTION, SOLUTION EPIDURAL; INFILTRATION; INTRACAUDAL; PERINEURAL at 10:27

## 2021-04-13 NOTE — PROGRESS NOTES
Procedure: right breast biopsy, right    There were  no complications and patient has no symptoms..      Tolerated procedure well.    Radiologist:Dr Sweet    Time Out: Prior to the start of the procedure and with procedural staff participation, I verbally confirmed the patient s identity using two indicators, relevant allergies, that the procedure was appropriate and matched the consent or emergent situation, and that the correct equipment/implants were available. Immediately prior to starting the procedure I conducted the Time Out with the procedural staff and re-confirmed the patient s name, procedure, and site/side. (The Joint Commission universal protocol was followed.)  Yes    Position:  supine/left side lying    Pain:  0    Sedation:None. Local Anesthestic used  No sedation    Estimated Blood Loss: Minimal     Condition: Stable    Comments: See dictated procedure note for full details     Jessica Hernandez RN

## 2021-04-13 NOTE — IP AVS SNAPSHOT
After Visit Summary Template Not Found    This Print Group is only intended to be used in the After Visit Summary and can only be used in a report that uses a released After Visit Summary Template.                       MRN:[id number]                      After Visit Summary   4/13/2021    Anne Marie Nix    MRN: [id number]           Visit Information        Provider Department      4/13/2021 10:00 AM [facility]; [facility]; [facility]           Review of your medicines      Notice    This visit is during an admission. Changes to the med list made in this visit will be reflected in the After Visit Summary of the admission.           Protect others around you: Learn how to safely use, store and throw away your medicines at www.disposemymeds.org.       Follow-ups after your visit       Your next 10 appointments already scheduled    Apr 13, 2021 11:15 AM  (Arrive by 10:15 AM)  MA POST PROCEDURE RIGHT with [address] ) [address] [place] [address]  [phone]   How do I prepare for my exam? (Food and drink instructions)  No Food and Drink Restrictions.    How do I prepare for my exam? (Other instructions)  Do not use any powder, lotion or deodorant under your arms or on your breast. If you do, we will ask you to remove it before your exam.    What should I wear: Wear comfortable, two-piece clothing.    How long does the exam take: Most scans will take 15 minutes.    What should I bring: Bring any previous mammograms from other facilities or have them mailed to the breast center.    Do I need a :  No  is needed.    What do I need to tell my doctor: If you have any allergies, tell your care team.    What should I do after the exam: No restrictions, you may resume normal activities.    What is this test: This test is an x-ray of the breast to look for breast disease. The breast is pressed between two plates to flatten and spread the  "tissue. An X-ray is taken of the breast from different angles.    Who should I call with questions: If you have any questions, please call the Imaging Department where you will have your exam. Directions, parking instructions, and other information are available on our website, [email or web address].    Other information about my exam  Three-dimensional (3D) mammograms are available at [facility] locations in [facility], [place], [place], [facility], [place], [facility] and [place]. [facility] locations include [place] and the [facility] in [place].    Benefits of 3D mammograms include:  * Improved rate of cancer detection  * Decreases your chance of having to go back for more tests, which means fewer:  * \"False-positive\" results (This means that there is an abnormal area but it isn't cancer.)  * Invasive testing procedures, such as a biopsy or surgery  * Can provide clearer images of the breast if you have dense breast tissue.    *3D mammography is an optional exam that anyone can have with a 2D mammogram. It doesn't replace or take the place of a 2D mammogram. 2D mammograms remain an effective screening test for all women.  Not all insurance companies cover the cost of a 3D mammogram. Check with your insurance.     Apr 21, 2021 10:00 AM  (Arrive by 9:45 AM)  CONSULT with Reginaldo Chen MD  [facility] [place] ([facility] [place] ) [address]  [place] [address]  [phone]         Care Instructions       Further instructions from your care team         DISCHARGE INSTRUCTIONS FOR  BREAST BIOPSY    BREAST BIOPSY  A needle was used to locate the breast tissue. Tissue was removed to check the cells and be examined by a Pathologist. This will help your doctor plan any further treatment or follow-up. Your doctor will tell you the results of the tests in 3 to 5 days.    Follow these instructions:    Climb stairs slowly and use the hand rail. " "Avoid extra trips. No running or jumping.    No pushing, pulling or straining (vacuuming, shoveling, sweeping etc.)    You may shower tomorrow, pat the are dry around the tegaderm dressing.    Wear a bra at all times until your breast is fully healed.    Apply ice to the breast during the first few hours following the procedure to relieve swelling and bruising.    Steri strips stay in place for 7-10 days or until they fall off. Do not pull them off.    May remove pressure dressing after 24 hours.    Call your doctor if you have:    increased bleeding or drainage from your incision.    swelling, redness or opening of your incision.    foul smell from your incision or dressing.    red streaks from your incision.    chills or fever over 101 degrees (by mouth).    pain not helped by your pain medication.    trouble or pain with breathing.    any new problems or concerns.    Additional Information About Your Visit       [employer] Information    [employer] lets you send messages to your doctor, view your test results, renew your prescriptions, schedule appointments and more. To sign up, go to [email or web address] . Click on \"Log in\" on the left side of the screen, which will take you to the Welcome page. Then click on \"Sign up Now\" on the right side of the page.     You will be asked to enter the access code listed below, as well as some personal information. Please follow the directions to create your username and password.     Your access code is: [email or web address]  Expires: 2021  3:00 AM     Your access code will  in 60 days. If you need help or a new code, please call your   [facility] clinic or [phone].       Care EveryWhere ID    This is your Care EveryWhere ID. This could be used by other organizations to access your [facility] medical records  [id number]       Your Vitals Were  Most recent update: 2021 10:04 AM    Blood Pressure   148/95   (BP Location: Right arm)    Pulse   102    Pulse " Oximetry   100%            Primary Care Provider Office Phone # Fax #    DEEPA Cobb [phone]      Equal Access to Services    TREVERLESTER GARAYZORAIDA : Hadii aad ku hadclarissao Soomaali, waaxda luqadaha, qaybta kaalmada adeegyada, leah damian. So [facility] [phone].    ATENCIÓN: Si habla español, tiene a mcconnell disposición servicios gratuitos de asistencia lingüística. Llame al [phone].    We comply with applicable federal and state civil rights laws, including the [place] Human Rights Act. We do not discriminate on the basis of race, color, creed, Zoroastrianism, national origin, marital status, age, disability, sex, sexual orientation, or gender identity.    If you would like an itemization of your charges they will now be available in [employer] 30 days after discharge. To access the itemized statements in [employer] go to billing/billing summary. From there select view account. There will be multiple tabs showing an overview of your account, detail, payments, and communications. From the communications tab you can see your monthly statements, your itemized statements, and any billing letters generated for your account. If you do not have a [employer] account and need help getting access please contact [employer] support at [phone].  If you would prefer to have your itemized statements mailed please contact our automated itemized bill request line at [phone] option  2.       Thank you!    Thank you for choosing [facility] for your care. Our goal is always to provide you with excellent care. Hearing back from our patients is one way we can continue to improve our services. Please take a few minutes to complete the written survey that you may receive in the mail after you visit with us. Thank you!         Medication List     Notice    This visit is during an admission. Changes to the med list made in this visit will be reflected in the After Visit Summary of the  admission.

## 2021-04-13 NOTE — IP AVS SNAPSHOT
[facility]  [address] [place] [address]  Phone: [phone]                                    After Visit Summary   4/13/2021    Anne Marie Nix    MRN: [id number]           After Visit Summary Signature Page    I have received my discharge instructions, and my questions have been answered. I have discussed any challenges I see with this plan with the nurse or doctor.    ..........................................................................................................................................  Patient/Patient Representative Signature      ..........................................................................................................................................  Patient Representative Print Name and Relationship to Patient    ..................................................               ................................................  Date                                   Time    ..........................................................................................................................................  Reviewed by Signature/Title    ...................................................              ..............................................  Date                                               Time          22EPIC Rev 08/18

## 2021-04-19 LAB — COPATH REPORT: NORMAL

## 2021-04-21 ENCOUNTER — OFFICE VISIT (OUTPATIENT)
Dept: SURGERY | Facility: OTHER | Age: 66
End: 2021-04-21
Attending: SURGERY
Payer: COMMERCIAL

## 2021-04-21 ENCOUNTER — ANCILLARY PROCEDURE (OUTPATIENT)
Dept: GENERAL RADIOLOGY | Facility: OTHER | Age: 66
End: 2021-04-21
Attending: SURGERY
Payer: COMMERCIAL

## 2021-04-21 VITALS
DIASTOLIC BLOOD PRESSURE: 72 MMHG | WEIGHT: 119 LBS | HEIGHT: 62 IN | SYSTOLIC BLOOD PRESSURE: 138 MMHG | BODY MASS INDEX: 21.9 KG/M2 | HEART RATE: 78 BPM | OXYGEN SATURATION: 99 % | RESPIRATION RATE: 12 BRPM | TEMPERATURE: 97.6 F

## 2021-04-21 DIAGNOSIS — C50.911 INVASIVE LOBULAR CARCINOMA OF BREAST, STAGE 1, RIGHT (H): ICD-10-CM

## 2021-04-21 DIAGNOSIS — C50.911 INVASIVE LOBULAR CARCINOMA OF BREAST, STAGE 1, RIGHT (H): Primary | ICD-10-CM

## 2021-04-21 LAB
ALBUMIN SERPL-MCNC: 3.6 G/DL (ref 3.4–5)
ALP SERPL-CCNC: 57 U/L (ref 40–150)
ALT SERPL W P-5'-P-CCNC: 43 U/L (ref 0–50)
ANION GAP SERPL CALCULATED.3IONS-SCNC: 3 MMOL/L (ref 3–14)
AST SERPL W P-5'-P-CCNC: 24 U/L (ref 0–45)
BASOPHILS # BLD AUTO: 0.1 10E9/L (ref 0–0.2)
BASOPHILS NFR BLD AUTO: 1.9 %
BILIRUB SERPL-MCNC: 0.3 MG/DL (ref 0.2–1.3)
BUN SERPL-MCNC: 21 MG/DL (ref 7–30)
CALCIUM SERPL-MCNC: 9 MG/DL (ref 8.5–10.1)
CHLORIDE SERPL-SCNC: 108 MMOL/L (ref 94–109)
CO2 SERPL-SCNC: 26 MMOL/L (ref 20–32)
CREAT SERPL-MCNC: 0.66 MG/DL (ref 0.52–1.04)
DIFFERENTIAL METHOD BLD: NORMAL
EOSINOPHIL # BLD AUTO: 0.2 10E9/L (ref 0–0.7)
EOSINOPHIL NFR BLD AUTO: 3.4 %
ERYTHROCYTE [DISTWIDTH] IN BLOOD BY AUTOMATED COUNT: 13.5 % (ref 10–15)
GFR SERPL CREATININE-BSD FRML MDRD: >90 ML/MIN/{1.73_M2}
GLUCOSE SERPL-MCNC: 83 MG/DL (ref 70–99)
HCT VFR BLD AUTO: 39.6 % (ref 35–47)
HGB BLD-MCNC: 12.6 G/DL (ref 11.7–15.7)
IMM GRANULOCYTES # BLD: 0 10E9/L (ref 0–0.4)
IMM GRANULOCYTES NFR BLD: 0 %
LYMPHOCYTES # BLD AUTO: 2 10E9/L (ref 0.8–5.3)
LYMPHOCYTES NFR BLD AUTO: 37.1 %
MCH RBC QN AUTO: 30.4 PG (ref 26.5–33)
MCHC RBC AUTO-ENTMCNC: 31.8 G/DL (ref 31.5–36.5)
MCV RBC AUTO: 95 FL (ref 78–100)
MONOCYTES # BLD AUTO: 0.6 10E9/L (ref 0–1.3)
MONOCYTES NFR BLD AUTO: 10.9 %
NEUTROPHILS # BLD AUTO: 2.5 10E9/L (ref 1.6–8.3)
NEUTROPHILS NFR BLD AUTO: 46.7 %
NRBC # BLD AUTO: 0 10*3/UL
NRBC BLD AUTO-RTO: 0 /100
PLATELET # BLD AUTO: 237 10E9/L (ref 150–450)
POTASSIUM SERPL-SCNC: 4.1 MMOL/L (ref 3.4–5.3)
PROT SERPL-MCNC: 7.2 G/DL (ref 6.8–8.8)
RBC # BLD AUTO: 4.15 10E12/L (ref 3.8–5.2)
SODIUM SERPL-SCNC: 137 MMOL/L (ref 133–144)
WBC # BLD AUTO: 5.3 10E9/L (ref 4–11)

## 2021-04-21 PROCEDURE — 99205 OFFICE O/P NEW HI 60 MIN: CPT | Performed by: SURGERY

## 2021-04-21 PROCEDURE — 80053 COMPREHEN METABOLIC PANEL: CPT | Performed by: SURGERY

## 2021-04-21 PROCEDURE — 85025 COMPLETE CBC W/AUTO DIFF WBC: CPT | Performed by: SURGERY

## 2021-04-21 PROCEDURE — 36415 COLL VENOUS BLD VENIPUNCTURE: CPT | Performed by: SURGERY

## 2021-04-21 PROCEDURE — 71046 X-RAY EXAM CHEST 2 VIEWS: CPT | Mod: TC | Performed by: RADIOLOGY

## 2021-04-21 ASSESSMENT — PAIN SCALES - GENERAL: PAINLEVEL: NO PAIN (0)

## 2021-04-21 ASSESSMENT — MIFFLIN-ST. JEOR: SCORE: 1043.03

## 2021-04-21 NOTE — NURSING NOTE
"Chief Complaint   Patient presents with     Breast Biopsy Results     right breast biopsy 4/13/2021       Initial /72 (BP Location: Right arm, Patient Position: Sitting, Cuff Size: Adult Regular)   Pulse 78   Temp 97.6  F (36.4  C) (Tympanic)   Resp 12   Ht 1.575 m (5' 2\")   Wt 54 kg (119 lb)   SpO2 99%   BMI 21.77 kg/m   Estimated body mass index is 21.77 kg/m  as calculated from the following:    Height as of this encounter: 1.575 m (5' 2\").    Weight as of this encounter: 54 kg (119 lb).  Medication Reconciliation: complete  CRISTINA GUAMAN LPN  "

## 2021-04-21 NOTE — PATIENT INSTRUCTIONS
Thank you for allowing Dr. Chen and our surgical team to participate in your care. Please call our health unit coordinator at [phone] with scheduling questions or the nurse at [phone] with any other questions or concerns.    Lab and X-ray today.    You will be contacted by diagnostic imaging to schedule your MRI appointment. Please call [phone] if you are not contacted in a timely manner.

## 2021-04-28 ENCOUNTER — TELEPHONE (OUTPATIENT)
Dept: SURGERY | Facility: OTHER | Age: 66
End: 2021-04-28

## 2021-04-28 LAB — COPATH REPORT: NORMAL

## 2021-04-28 NOTE — TELEPHONE ENCOUNTER
Message received from Dr. Chen indicating that he was notified that patient had cancelled breast MRI by diagnostic imaging scheduler. He would like to know if the patient has arranged this elsewhere.     Other labs are still pending.

## 2021-04-28 NOTE — TELEPHONE ENCOUNTER
Called patient. She has decided to not do MRI because she doesn't know what she wants to do and is waiting for HER2 results. She says that she will let Dr. Chen know what she wants to do once she decides and after results are back, but that she may also seek a second opinion.

## 2021-04-28 NOTE — PROGRESS NOTES
"[facility] Surgery Consultation    CC:  Breast biopsy results     HPI:  This 65 year old year old female is seen at the request of Kim Wehat for evaluation and follow up right breast biopsy. She did note a palpable lump which brought this to her PCP attention. This lead to current biopsy. She denies any skin changes, nipple changes or discharge.     Age at first menses: > 14  Age at first birth: 22   1st degree relative with breast cancer: 1- mother   No prior breast biopsies   Non-smoker   Not obese   No hormone replacement     Past Medical History:   Diagnosis Date     Acne vulgaris      Premenstrual tension syndromes 10/14/2004       No past surgical history on file.    Allergies   Allergen Reactions     Amoxicillin Trihydrate Other (See Comments)     Headache  Stomach aches  Augmentin         Clavulanic Acid Potassium Other (See Comments)     Headaches  Stomach aches  Augmentin         Current Outpatient Medications   Medication     methylphenidate (RITALIN) 10 MG tablet     pseudoePHEDrine (SUDAFED) 30 MG tablet     valACYclovir (VALTREX) 1000 mg tablet     mupirocin (BACTROBAN) 2 % external ointment     No current facility-administered medications for this visit.        HABITS:    Social History     Tobacco Use     Smoking status: Never Smoker     Smokeless tobacco: Never Used   Substance Use Topics     Alcohol use: No     Drug use: No       Family History   Problem Relation Age of Onset     Allergies Mother      Breast Cancer Mother      Diabetes Mother      Cardiovascular Paternal Grandfather      Allergies Paternal Grandfather      Other - See Comments Father        REVIEW OF SYSTEMS:  Ten point review of systems negative except those mentioned in the HPI.     OBJECTIVE:    /72 (BP Location: Right arm, Patient Position: Sitting, Cuff Size: Adult Regular)   Pulse 78   Temp 97.6  F (36.4  C) (Tympanic)   Resp 12   Ht 1.575 m (5' 2\")   Wt 54 kg (119 lb)   SpO2 99%   BMI 21.77 kg/m  "     GENERAL: Generally appears well, in no distress with appropriate affect.  HEENT:   Sclerae anicteric - normocephalic atraumatic   Respiratory:  No acute distress, no splinting   Cardiovascular:  Regular Rate and Rhythm  BREASTS: In the seated position, with the arms elevated, breast are symmetric. There is no overlying skin change.   In the supine position, the right breast shows largely fatty replacement with scattered fibrofatty change. there is a discrete mass at the 7 o'clock position The subareolar region, axillary tail and right axilla are without finding. Axillary nodes were felt though did not feel enlarged.   The left breast shows largely fatty replacement with scattered fibrofatty change. There are no discrete or suspicious masses or regions of density. The subareolar region, axillary tail and left axilla are without finding. Axillary nodes felt though not enlarged.     :  deferred  Extremities:  Extremities normal. No deformities, edema, or skin discoloration.  Skin:  no suspicious lesions or rashes  Neurological: grossly intact  Psych:  Alert, oriented, affect appropriate with normal decision making ability.    IMPRESSION:   Invasive lobular cancer   Staging form: Breast, AJCC 8th Edition  - Clinical: cT1, cN0, cM0, ER+ (30%), MA-, HER2 Pending      Discussed that will need Her2 status before making any surgical plans. The tumor does appear small clinically but would like to confirm with breast MR as invasive lobular tend to be larger. We reviewed her Ami mode risk and she is 11.1% vs 8.1 %. Will get CXR and set of labs as well per NCCN guidelines for breast cancer staging. Will discuss results once MR is back. Her axillary nodes were somewhat palpable though she is very thin and both sides did appear symmetric will look further with MR to see if there is any that look pathological.             I reviewed the imaging, diagnosis, staging, and management (including surgery, chemotherapy, radiation  "therapy, and endocrine therapy) of breast cancer with patient. A copy of the biopsy pathology report was also provided.           The mainstay of treatment for resectable breast cancer is surgical resection, in the form of either breast conservation (segmental mastectomy plus radiation) or mastectomy.  We reviewed that the two strategies are equivalent in terms of overall survival.  The advantages and disadvantages of each were discussed.   The patient IS a candidate for breast conservation therapy.  We discussed that this involves two necessary components: the lumpectomy (or \"segmental mastectomy\"), and several weeks of whole breast radiation therapy.  We discussed that the overall survival after breast conservation therapy is identical to mastectomy and that local recurrence rates are significantly higher if segmental mastectomy was performed without subsequent radiation.  We also discussed the significance of clear margins and that a subsequent procedure may be necessary to achieve this.  The risks of a segmental mastectomy were discussed with the patient and family, including the risks of bleeding, wound infection, wound dehiscence, and post-operative contour change (including scar formation) to the breast.  Depending on the margin status post-resection, further surgery may be necessary.      Alternatively, we also discussed mastectomy.   The risks of a mastectomy were discussed with the patient and family, including the risks of bleeding, wound infection, wound dehiscence, skin flap/nipple necrosis, and seroma formation.    The option of having immediate versus delayed reconstruction was also discussed.   We reviewed that the advantages of immediate reconstruction includes superior cosmetics, as the skin is preserved.  However, the major disadvantage is increased postoperative risks, including skin flap ischemia and expander infection, which can potentially delay adjuvant oncologic treatments which may be " needed post-surgically. . Depending on the margin and kaelyn status post-mastectomy, radiation may be necessary.      In addition to the surgical management of the breast, a sentinel lymph node biopsy is recommended for kaelyn staging of the axilla.  This is performed with the combination of the radioactive colloid and lymphazurin. The risks of a sentinel lymph node biopsy were discussed with the patient and family, including the risks of lymphedema (5-10%), bleeding, wound infection, wound dehiscence, seroma formation, and paresthesias. There is also a small risk of anaphylaxis with lymphazurin injection as part of the procedure. There is an approximately 10% false negative rate associated with sentinel lymph node biopsy as published in the literature.  The findings of the sentinel lymph node biopsy may result in the need for further surgery (i.e. Axillary lymph node dissection) or radiation.      We discussed that surgical pathology results will be reviewed in person in clinic, at the postoperative visit. Because pathology reports are often complicated, results are not reviewed by phone.  Reviewing in person would also allow for careful discussion of next steps and for answering questions.     Finally, we reviewed that as part of team-based approach to breast cancer, medical oncology and radiation oncology referrals will also be made after surgery to discuss adjuvant systemic/endocrine therapy and radiation therapy.  The decision regarding adjuvant chemotherapy will be made after surgery.      All of the above was discussed with the patient all questions were answered.  She elected to proceed with the above imaging and will call the office with a decision regarding surgery.     Total time spent with the patient was 60 minutes, of which 75% was counseling.       PLAN:  See above     Reginaldo Chen MD,     4/28/2021  12:30 PM

## 2021-05-03 NOTE — TELEPHONE ENCOUNTER
I called patient and gave results, I gave recommendation that the next best step would be surgery, she wishes to discuss with her son in law, She may wish for a second opinion but will let me know. I discussed the best option would be surgery sooner than later to ensure it doesn't spread. She will be in touch with our clinic.     Reginaldo Chen MD

## 2021-05-07 DIAGNOSIS — F90.0 ATTENTION DEFICIT HYPERACTIVITY DISORDER (ADHD), PREDOMINANTLY INATTENTIVE TYPE: ICD-10-CM

## 2021-05-07 RX ORDER — METHYLPHENIDATE HYDROCHLORIDE 10 MG/1
10 TABLET ORAL 3 TIMES DAILY
Qty: 90 TABLET | Refills: 0 | Status: SHIPPED | OUTPATIENT
Start: 2021-05-07 | End: 2021-06-08

## 2021-05-07 NOTE — TELEPHONE ENCOUNTER
Ritalin       Last Written Prescription Date:  4/6/21  Last Fill Quantity: 90,   # refills: 0  Last Office Visit: 3/30/21  Future Office visit:       Routing refill request to provider for review/approval because:  Drug not active on patient's medication list

## 2021-06-08 DIAGNOSIS — F90.0 ATTENTION DEFICIT HYPERACTIVITY DISORDER (ADHD), PREDOMINANTLY INATTENTIVE TYPE: ICD-10-CM

## 2021-06-08 RX ORDER — METHYLPHENIDATE HYDROCHLORIDE 10 MG/1
10 TABLET ORAL 3 TIMES DAILY
Qty: 90 TABLET | Refills: 0 | Status: SHIPPED | OUTPATIENT
Start: 2021-06-08 | End: 2021-07-12

## 2021-06-08 NOTE — TELEPHONE ENCOUNTER
methylphenidate (RITALIN) 10 MG tablet      Last Written Prescription Date:  05/07/21  Last Fill Quantity: 90,   # refills: 0  Last Office Visit: 03/30/21  Future Office visit:       Routing refill request to provider for review/approval because:  Drug not on the [facility], P or [facility] refill protocol or controlled substance     Detail Level: Detailed

## 2021-06-14 NOTE — TELEPHONE ENCOUNTER
Attempt # 2  Outcome: Left Message   Comment: Left message to return call to clinic to schedule ADHD follow up with Kim Wheat CNP

## 2021-06-16 NOTE — TELEPHONE ENCOUNTER
Attempt # 3  Outcome: Letter sent - max attempts reached   Comment: I have been unable to reach this patient by phone.  A letter is being sent to the last known home address.

## 2021-06-24 ENCOUNTER — TELEPHONE (OUTPATIENT)
Dept: SURGERY | Facility: OTHER | Age: 66
End: 2021-06-24

## 2021-06-24 NOTE — TELEPHONE ENCOUNTER
Several attempts have been made to follow up about her diagnosis and to ensure she has sought out a second opinion. Left message today to call clinic.     Reginaldo Chen MD

## 2021-06-28 NOTE — TELEPHONE ENCOUNTER
I left a message for Anne Marie to try to call us back about surgical department trying to get a hold of her.  If she calls will you come and get me to talk to her    Kim ARENAS FNP-BC  Family Nurse Practitioner

## 2021-07-06 NOTE — PROGRESS NOTES
l    Assessment & Plan     Malignant neoplasm of right female breast, unspecified estrogen receptor status, unspecified site of breast (H)  Working with [facility] for breast cancer. Plan for lumpectomy and also working with integrative Health for lifestyle changes    Attention deficit hyperactivity disorder (ADHD), predominantly inattentive type  Stable - continue current plan of care  She is able to focus and complete task       Review of prior external note(s) from - CareEverywhere information from [facility] reviewed  30 minutes spent on the date of the encounter doing chart review, review of outside records, review of test results, interpretation of tests, patient visit and documentation        See Patient Instructions    Return in about 3 months (around 10/7/2021) for adhd.    DEEPA Jama [facility] - BASSEM Kenney is a 65 year old who presents for the following health issues     HPI     Breast Cancer  She is working with [facility] - she feel comfortable with functional medicine and surgeon  Functional medicine provider (for her diet and health) and general surgeon Dr Guerrero for removal (lumpectomy)      Depression and Anxiety Follow-Up/ADHD    How are you doing with your depression since your last visit? Improved     How are you doing with your anxiety since your last visit?  Improved     Are you having other symptoms that might be associated with depression or anxiety? No    Have you had a significant life event? Health Concerns     Do you have any concerns with your use of alcohol or other drugs? No     She states the ritalin helps her to focus     Reviewed  - medication as noted on chart     Social History     Tobacco Use     Smoking status: Never Smoker     Smokeless tobacco: Never Used   Substance Use Topics     Alcohol use: No     Drug use: No     PHQ 3/3/2020 12/9/2020 7/7/2021   PHQ-9 Total Score 0 0 0   Q9: Thoughts of better off dead/self-harm past 2  weeks Not at all Not at all Not at all     FABIANA-7 SCORE 3/3/2020 12/9/2020 7/7/2021   Total Score 0 0 0     Last PHQ-9 7/7/2021   1.  Little interest or pleasure in doing things 0   2.  Feeling down, depressed, or hopeless 0   3.  Trouble falling or staying asleep, or sleeping too much 0   4.  Feeling tired or having little energy 0   5.  Poor appetite or overeating 0   6.  Feeling bad about yourself 0   7.  Trouble concentrating 0   8.  Moving slowly or restless 0   Q9: Thoughts of better off dead/self-harm past 2 weeks 0   PHQ-9 Total Score 0   Difficulty at work, home, or with people Not difficult at all     FABIANA-7  7/7/2021   1. Feeling nervous, anxious, or on edge 0   2. Not being able to stop or control worrying 0   3. Worrying too much about different things 0   4. Trouble relaxing 0   5. Being so restless that it is hard to sit still 0   6. Becoming easily annoyed or irritable 0   7. Feeling afraid, as if something awful might happen 0   FABIANA-7 Total Score 0   If you checked any problems, how difficult have they made it for you to do your work, take care of things at home, or get along with other people? Not difficult at all       Suicide Assessment Five-step Evaluation and Treatment (SAFE-T)          Review of Systems   CONSTITUTIONAL:NEGATIVE for fever, chills, change in weight  INTEGUMENTARY/SKIN: NEGATIVE for worrisome rashes, moles or lesions  RESP:NEGATIVE for significant cough or SOB  BREAST: right breast cancer - lump remains no change   CV: NEGATIVE for chest pain, palpitations or peripheral edema  GI: NEGATIVE for nausea, abdominal pain, heartburn, or change in bowel habits  : denies dysuria   NEURO: NEGATIVE for weakness, dizziness or paresthesias  ENDOCRINE: slight weight loss  PSYCHIATRIC: ADHD      Objective    /60   Pulse 88   Temp 96.9  F (36.1  C)   Wt 51.7 kg (114 lb)   SpO2 99%   BMI 20.85 kg/m    Body mass index is 20.85 kg/m .  Physical Exam   GENERAL: alert, no distress and  some recent weigh loss   NECK: no adenopathy, no asymmetry, masses, or scars and thyroid normal to palpation  RESP: lungs clear to auscultation - no rales, rhonchi or wheezes  CV: regular rate and rhythm, normal S1 S2, no S3 or S4, no murmur, click or rub, no peripheral edema and peripheral pulses strong  ABDOMEN: soft, nontender, no hepatosplenomegaly, no masses and bowel sounds normal  SKIN: no suspicious lesions or rashes  NEURO: Normal strength and tone, mentation intact and speech normal  PSYCH: mentation appears normal, affect normal/bright    Office Visit on 04/21/2021   Component Date Value Ref Range Status     WBC 04/21/2021 5.3  4.0 - 11.0 10e9/L Final     RBC Count 04/21/2021 4.15  3.8 - 5.2 10e12/L Final     Hemoglobin 04/21/2021 12.6  11.7 - 15.7 g/dL Final     Hematocrit 04/21/2021 39.6  35.0 - 47.0 % Final     MCV 04/21/2021 95  78 - 100 fl Final     MCH 04/21/2021 30.4  26.5 - 33.0 pg Final     MCHC 04/21/2021 31.8  31.5 - 36.5 g/dL Final     RDW 04/21/2021 13.5  10.0 - 15.0 % Final     Platelet Count 04/21/2021 237  150 - 450 10e9/L Final     Diff Method 04/21/2021 Automated Method   Final     % Neutrophils 04/21/2021 46.7  % Final     % Lymphocytes 04/21/2021 37.1  % Final     % Monocytes 04/21/2021 10.9  % Final     % Eosinophils 04/21/2021 3.4  % Final     % Basophils 04/21/2021 1.9  % Final     % Immature Granulocytes 04/21/2021 0.0  % Final     Nucleated RBCs 04/21/2021 0  0 /100 Final     Absolute Neutrophil 04/21/2021 2.5  1.6 - 8.3 10e9/L Final     Absolute Lymphocytes 04/21/2021 2.0  0.8 - 5.3 10e9/L Final     Absolute Monocytes 04/21/2021 0.6  0.0 - 1.3 10e9/L Final     Absolute Eosinophils 04/21/2021 0.2  0.0 - 0.7 10e9/L Final     Absolute Basophils 04/21/2021 0.1  0.0 - 0.2 10e9/L Final     Abs Immature Granulocytes 04/21/2021 0.0  0 - 0.4 10e9/L Final     Absolute Nucleated RBC 04/21/2021 0.0   Final     Sodium 04/21/2021 137  133 - 144 mmol/L Final     Potassium 04/21/2021 4.1  3.4 -  5.3 mmol/L Final     Chloride 04/21/2021 108  94 - 109 mmol/L Final     Carbon Dioxide 04/21/2021 26  20 - 32 mmol/L Final     Anion Gap 04/21/2021 3  3 - 14 mmol/L Final     Glucose 04/21/2021 83  70 - 99 mg/dL Final     Urea Nitrogen 04/21/2021 21  7 - 30 mg/dL Final     Creatinine 04/21/2021 0.66  0.52 - 1.04 mg/dL Final     GFR Estimate 04/21/2021 >90  >60 mL/min/[1.73_m2] Final    Comment: Non  GFR Calc  Starting 12/18/2018, serum creatinine based estimated GFR (eGFR) will be   calculated using the Chronic Kidney Disease Epidemiology Collaboration   (CKD-EPI) equation.       GFR Estimate If Black 04/21/2021 >90  >60 mL/min/[1.73_m2] Final    Comment:  GFR Calc  Starting 12/18/2018, serum creatinine based estimated GFR (eGFR) will be   calculated using the Chronic Kidney Disease Epidemiology Collaboration   (CKD-EPI) equation.       Calcium 04/21/2021 9.0  8.5 - 10.1 mg/dL Final     Bilirubin Total 04/21/2021 0.3  0.2 - 1.3 mg/dL Final     Albumin 04/21/2021 3.6  3.4 - 5.0 g/dL Final     Protein Total 04/21/2021 7.2  6.8 - 8.8 g/dL Final     Alkaline Phosphatase 04/21/2021 57  40 - 150 U/L Final     ALT 04/21/2021 43  0 - 50 U/L Final     AST 04/21/2021 24  0 - 45 U/L Final

## 2021-07-07 ENCOUNTER — OFFICE VISIT (OUTPATIENT)
Dept: FAMILY MEDICINE | Facility: OTHER | Age: 66
End: 2021-07-07
Attending: NURSE PRACTITIONER
Payer: MEDICARE

## 2021-07-07 ENCOUNTER — TELEPHONE (OUTPATIENT)
Dept: FAMILY MEDICINE | Facility: OTHER | Age: 66
End: 2021-07-07

## 2021-07-07 VITALS
HEART RATE: 88 BPM | DIASTOLIC BLOOD PRESSURE: 60 MMHG | TEMPERATURE: 96.9 F | WEIGHT: 114 LBS | SYSTOLIC BLOOD PRESSURE: 130 MMHG | OXYGEN SATURATION: 99 % | BODY MASS INDEX: 20.85 KG/M2

## 2021-07-07 DIAGNOSIS — F90.0 ATTENTION DEFICIT HYPERACTIVITY DISORDER (ADHD), PREDOMINANTLY INATTENTIVE TYPE: ICD-10-CM

## 2021-07-07 DIAGNOSIS — C50.911 MALIGNANT NEOPLASM OF RIGHT FEMALE BREAST, UNSPECIFIED ESTROGEN RECEPTOR STATUS, UNSPECIFIED SITE OF BREAST (H): Primary | ICD-10-CM

## 2021-07-07 PROCEDURE — G0463 HOSPITAL OUTPT CLINIC VISIT: HCPCS

## 2021-07-07 PROCEDURE — 99214 OFFICE O/P EST MOD 30 MIN: CPT | Performed by: NURSE PRACTITIONER

## 2021-07-07 PROCEDURE — G0463 HOSPITAL OUTPT CLINIC VISIT: HCPCS | Mod: 25

## 2021-07-07 ASSESSMENT — ANXIETY QUESTIONNAIRES

## 2021-07-07 ASSESSMENT — PATIENT HEALTH QUESTIONNAIRE - PHQ9: SUM OF ALL RESPONSES TO PHQ QUESTIONS 1-9: 0

## 2021-07-07 NOTE — NURSING NOTE
"No chief complaint on file.      Initial /60   Pulse 88   Temp 96.9  F (36.1  C)   Wt 51.7 kg (114 lb)   SpO2 99%   BMI 20.85 kg/m   Estimated body mass index is 20.85 kg/m  as calculated from the following:    Height as of 4/21/21: 1.575 m (5' 2\").    Weight as of this encounter: 51.7 kg (114 lb).  Medication Reconciliation: complete  Demarcus Molina LPN    "

## 2021-07-08 ASSESSMENT — ANXIETY QUESTIONNAIRES: GAD7 TOTAL SCORE: 0

## 2021-07-09 DIAGNOSIS — F90.0 ATTENTION DEFICIT HYPERACTIVITY DISORDER (ADHD), PREDOMINANTLY INATTENTIVE TYPE: ICD-10-CM

## 2021-07-09 NOTE — TELEPHONE ENCOUNTER
methylphenidate (RITALIN) 10 MG tablet  Last Written Prescription Date:  6/8/21  Last Fill Quantity: 90,  # refills: 0   Last office visit: 7/7/2021   Future Office Visit:      Routing refill request to provider for review/approval because:  Drug not on the [facility] refill protocol

## 2021-07-12 RX ORDER — METHYLPHENIDATE HYDROCHLORIDE 10 MG/1
10 TABLET ORAL 3 TIMES DAILY
Qty: 90 TABLET | Refills: 0 | Status: SHIPPED | OUTPATIENT
Start: 2021-07-12 | End: 2021-08-10

## 2021-08-10 DIAGNOSIS — F90.0 ATTENTION DEFICIT HYPERACTIVITY DISORDER (ADHD), PREDOMINANTLY INATTENTIVE TYPE: ICD-10-CM

## 2021-08-10 RX ORDER — METHYLPHENIDATE HYDROCHLORIDE 10 MG/1
10 TABLET ORAL 3 TIMES DAILY
Qty: 90 TABLET | Refills: 0 | Status: SHIPPED | OUTPATIENT
Start: 2021-08-10 | End: 2021-09-17

## 2021-08-10 NOTE — TELEPHONE ENCOUNTER
ritalin      Last Written Prescription Date:  7/12/21  Last Fill Quantity: 90,   # refills: 0  Last Office Visit: 7/7/21  Future Office visit:       Routing refill request to provider for review/approval because:  Drug not on the [facility], P or [facility] refill protocol or controlled substance

## 2021-09-09 ENCOUNTER — PATIENT OUTREACH (OUTPATIENT)
Dept: RADIATION ONCOLOGY | Facility: HOSPITAL | Age: 66
End: 2021-09-09

## 2021-09-09 DIAGNOSIS — Z17.0 MALIGNANT NEOPLASM OF LOWER-OUTER QUADRANT OF RIGHT BREAST OF FEMALE, ESTROGEN RECEPTOR POSITIVE (H): ICD-10-CM

## 2021-09-09 DIAGNOSIS — C50.511 MALIGNANT NEOPLASM OF LOWER-OUTER QUADRANT OF RIGHT BREAST OF FEMALE, ESTROGEN RECEPTOR POSITIVE (H): ICD-10-CM

## 2021-09-09 ASSESSMENT — ACTIVITIES OF DAILY LIVING (ADL): DEPENDENT_IADLS:: INDEPENDENT

## 2021-09-09 NOTE — PROGRESS NOTES
PRE VISIT MEDICAL ONCOLOGY     REASON FOR APPOINTMENT    Type of Cancer -   [id number]      SYMPTOMS   Symptom onset - found on self breast exam    Medical Provider Seen Initially - Kim Wheat    PROVIDERS  Referring MD -    PCP - Kim Wheat  Surgeon -   Other provider(s) seen for consultation or treatment -      TREATMENT TO-DATE FOR THIS CANCER  Biopsy -4-13-21 Invasive lobular carcinoma Grade 2  ER + HER2/BRITTANY (-)  Surgery - Lumpectomy with SNB 1/2 positive  Invasive lobular carcinoma 3.0 cm      Chemotherapy -      Oncologist -  Friday  Hormonal therapy used - None  Other treatments for this Cancer (including over-the-counter) - None    PRIOR HISTORY OF CANCER  none    RECENT IMAGING STUDIES    (list setting/date)  U/S-Mammogram - 4-9-21  PET - NA  CT - (CAP)-Chest-Abdomen-Pelvis  /  Head-Neck  Bone Scan (Dexa) - NA  MRI -Breast MRI 7-9-21  ECHO -    X-Ray - last CXR   /   EKG  Other -     CENTRAL LINE/PORT   None         ADDITIONAL INFORMATION FOR BREAST AND GYN MALIGNANCIES  Age at first menses - 17  Age at menopause or LMP - 38 or 40  Age at first pregnancy - 22  Number of pregnancies - 5  Breast Fed - Yes nursed all 4      HEALTH SCREENING (list most recent dates)  Mammogram -  Colonoscopy -  Dental Exam -   T-Dap - 9-3-20  Pneumonia - 8-1-21  Flu Shot -   Shingles - 8-1-6    FAMILY CANCER HISTORY (list relative/type of cancer)      Mother had breast and lung cancer  Cousins breast            TOBACCO USE HISTORY      Never smoker     OTHER PERTINENT CANCER INFORMATION NOT IDENTIFIED ELSEWHERE      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 She has multiple questions and is not very confident in the health care system She denies pain, SOB, cough, bladder and bowel concerns. She is currently involved with nutritionist, integrative medicine, functional medicine as well as conventional medicine. She had requested a referral to the [facility] and it is currently in review. She has has been referred  to genetic counseling, but needed to cancel d/t conflicting appointments. She will call to make an appointment prior to appointment with radiation oncology. If she is not able to get in, in a reasonable amount of time she would like a referral to the [place].

## 2021-09-09 NOTE — PROGRESS NOTES
Clinic Care Coordination Contact    Clinic Care Coordination Contact  OUTREACH    Referral Information:       Primary Diagnosis: Oncology    Chief Complaint   Patient presents with     Clinic Care Coordination - Initial        Universal Utilization: Records retrieved from [facility]  Clinic Utilization  Difficulty keeping appointments:: No  Compliance Concerns: No  No-Show Concerns: No  No PCP office visit in Past Year: No  Utilization    Hospital Admissions  1             ED Visits  0             No Show Count (past year)  0                Current as of: 9/7/2021  6:25 PM              Clinical Concerns:  Current Medical Concerns:  No concerns    Current Behavioral Concerns: none    Education Provided to patient: Education provided on radiation therapy, genetic counseling, the different types of mastectomies.   Pain  Pain (GOAL):: No  Health Maintenance Reviewed:    Clinical Pathway: None    Medication Management:  Medication review status: Medications reviewed and no changes reported per patient.        Patient is able to manage own medications     Functional Status:  Dependent ADLs:: Independent  Dependent IADLs:: Independent  Bed or wheelchair confined:: No  Mobility Status: Independent  Fallen 2 or more times in the past year?: No  Any fall with injury in the past year?: No    Living Situation:  Current living arrangement:: I live in a private home  Type of residence:: Private home - [facility]    Lifestyle & Psychosocial Needs:    Social Determinants of Health     Tobacco Use: Low Risk      Smoking Tobacco Use: Never Smoker     Smokeless Tobacco Use: Never Used   Alcohol Use:      Frequency of Alcohol Consumption:      Average Number of Drinks:      Frequency of Binge Drinking:    Financial Resource Strain:      Difficulty of Paying Living Expenses:    Food Insecurity:      Worried About Running Out of Food in the Last Year:      Ran Out of Food in the Last Year:    Transportation Needs:      Lack of  Transportation (Medical):      Lack of Transportation (Non-Medical):    Physical Activity:      Days of Exercise per Week:      Minutes of Exercise per Session:    Stress:      Feeling of Stress :    Social Connections:      Frequency of Communication with Friends and Family:      Frequency of Social Gatherings with Friends and Family:      Attends Denominational Services:      Active Member of Clubs or Organizations:      Attends Club or Organization Meetings:      Marital Status:    Intimate Partner Violence:      Fear of Current or Ex-Partner:      Emotionally Abused:      Physically Abused:      Sexually Abused:    Depression: Not at risk     PHQ-2 Score: 0   Housing Stability:      Unable to Pay for Housing in the Last Year:      Number of Places Lived in the Last Year:      Unstable Housing in the Last Year:      Diet:: Other (Keto diet)  Inadequate nutrition (GOAL):: No  Tube Feeding: No  Inadequate activity/exercise (GOAL):: No  Significant changes in sleep pattern (GOAL): No  Transportation means:: Regular car     Denominational or spiritual beliefs that impact treatment:: No  Mental health DX:: No  Mental health management concern (GOAL):: No             Resources and Interventions:  Current Resources:      Community Resources: None  Supplies used at home:: None  Equipment Currently Used at Home: none            Advance Care Plan/Directive  Advanced Care Plans/Directives on file:: No    Referrals Placed: None     Goals:       Patient/Caregiver understanding: Patient verbalizes understanding of what to expect with upcoming appointment.       Future Appointments              [facility]          Plan: consult with Dr. Garcia

## 2021-09-10 ENCOUNTER — ONCOLOGY VISIT (OUTPATIENT)
Dept: RADIATION ONCOLOGY | Facility: HOSPITAL | Age: 66
End: 2021-09-10
Attending: RADIOLOGY
Payer: MEDICARE

## 2021-09-10 VITALS — WEIGHT: 117.6 LBS | BODY MASS INDEX: 20.84 KG/M2 | HEIGHT: 63 IN

## 2021-09-10 DIAGNOSIS — Z17.0 MALIGNANT NEOPLASM OF LOWER-OUTER QUADRANT OF RIGHT BREAST OF FEMALE, ESTROGEN RECEPTOR POSITIVE (H): Primary | ICD-10-CM

## 2021-09-10 DIAGNOSIS — C50.511 MALIGNANT NEOPLASM OF LOWER-OUTER QUADRANT OF RIGHT BREAST OF FEMALE, ESTROGEN RECEPTOR POSITIVE (H): Primary | ICD-10-CM

## 2021-09-10 PROCEDURE — G0463 HOSPITAL OUTPT CLINIC VISIT: HCPCS | Performed by: RADIOLOGY

## 2021-09-10 PROCEDURE — 99205 OFFICE O/P NEW HI 60 MIN: CPT | Performed by: RADIOLOGY

## 2021-09-10 PROCEDURE — 99417 PROLNG OP E/M EACH 15 MIN: CPT | Performed by: RADIOLOGY

## 2021-09-10 ASSESSMENT — MIFFLIN-ST. JEOR: SCORE: 1047.56

## 2021-09-10 ASSESSMENT — PATIENT HEALTH QUESTIONNAIRE - PHQ9: SUM OF ALL RESPONSES TO PHQ QUESTIONS 1-9: 1

## 2021-09-10 ASSESSMENT — PAIN SCALES - GENERAL: PAINLEVEL: NO PAIN (0)

## 2021-09-10 NOTE — NURSING NOTE
"Oncology Rooming Note    September 10, 2021 8:59 AM   Anne Marie Nix is a 66 year old female who presents for:    No chief complaint on file.    Initial Vitals: Ht 1.6 m (5' 3\")   Wt 53.3 kg (117 lb 9.6 oz)   BMI 20.83 kg/m   Estimated body mass index is 20.83 kg/m  as calculated from the following:    Height as of this encounter: 1.6 m (5' 3\").    Weight as of this encounter: 53.3 kg (117 lb 9.6 oz). Body surface area is 1.54 meters squared.  No Pain (0) Comment: Data Unavailable   No LMP recorded. Patient is postmenopausal.  Allergies reviewed: Yes  Medications reviewed: Yes    Medications: Medication refills not needed today.  Pharmacy name entered into [facility]:    [facility][id number] - [place], [place] - [address]  [id number] - [place] - [address] [facility] [address]  [id number] - [place], [place] - [address]  [address] [facility] [address]  [facility] [address] - [place], [place] - [address]    Clinical concerns: Wants keep control  Dr. Garcia was notified.      Earnestine Stanton RN              "

## 2021-09-17 DIAGNOSIS — F90.0 ATTENTION DEFICIT HYPERACTIVITY DISORDER (ADHD), PREDOMINANTLY INATTENTIVE TYPE: ICD-10-CM

## 2021-09-17 RX ORDER — METHYLPHENIDATE HYDROCHLORIDE 10 MG/1
10 TABLET ORAL 3 TIMES DAILY
Qty: 90 TABLET | Refills: 0 | Status: SHIPPED | OUTPATIENT
Start: 2021-09-17 | End: 2021-10-20

## 2021-09-17 NOTE — TELEPHONE ENCOUNTER
Ritalin       Last Written Prescription Date:  8/10/21  Last Fill Quantity: 90,   # refills: 0  Last Office Visit: 7/7/21  Future Office visit:       Routing refill request to provider for review/approval because:  Drug not on the [facility], P or [facility] refill protocol or controlled substance

## 2021-10-03 ENCOUNTER — HEALTH MAINTENANCE LETTER (OUTPATIENT)
Age: 66
End: 2021-10-03

## 2021-10-08 PROBLEM — M62.81 MUSCLE WEAKNESS: Status: ACTIVE | Noted: 2021-08-06

## 2021-10-08 PROBLEM — M25.611 STIFFNESS OF JOINT, SHOULDER REGION, RIGHT: Status: ACTIVE | Noted: 2021-08-06

## 2021-10-08 RX ORDER — DAPSONE 50 MG/G
1 GEL TOPICAL
COMMUNITY
Start: 2021-07-23 | End: 2022-11-23

## 2021-10-08 RX ORDER — DEXAMETHASONE 4 MG/1
TABLET ORAL
COMMUNITY
Start: 2021-09-22 | End: 2021-12-22

## 2021-10-08 RX ORDER — VALACYCLOVIR HYDROCHLORIDE 1 G/1
2000 TABLET, FILM COATED ORAL
COMMUNITY
Start: 2020-12-09 | End: 2021-10-11

## 2021-10-08 RX ORDER — PROCHLORPERAZINE MALEATE 10 MG
TABLET ORAL
COMMUNITY
Start: 2021-09-22 | End: 2022-11-23

## 2021-10-08 RX ORDER — TRAMADOL HYDROCHLORIDE 50 MG/1
TABLET ORAL
COMMUNITY
Start: 2021-08-23 | End: 2021-10-11

## 2021-10-08 RX ORDER — CLINDAMYCIN AND BENZOYL PEROXIDE 10; 50 MG/G; MG/G
GEL TOPICAL
COMMUNITY
Start: 2021-07-23 | End: 2022-11-23

## 2021-10-11 ENCOUNTER — ONCOLOGY VISIT (OUTPATIENT)
Dept: ONCOLOGY | Facility: OTHER | Age: 66
End: 2021-10-11
Attending: INTERNAL MEDICINE
Payer: MEDICARE

## 2021-10-11 ENCOUNTER — TELEPHONE (OUTPATIENT)
Dept: SURGERY | Facility: OTHER | Age: 66
End: 2021-10-11

## 2021-10-11 ENCOUNTER — HOSPITAL ENCOUNTER (OUTPATIENT)
Dept: MRI IMAGING | Facility: HOSPITAL | Age: 66
End: 2021-10-11
Attending: INTERNAL MEDICINE
Payer: MEDICARE

## 2021-10-11 VITALS
WEIGHT: 117.28 LBS | SYSTOLIC BLOOD PRESSURE: 120 MMHG | OXYGEN SATURATION: 98 % | BODY MASS INDEX: 20.78 KG/M2 | RESPIRATION RATE: 20 BRPM | HEIGHT: 63 IN | HEART RATE: 89 BPM | TEMPERATURE: 97.9 F | DIASTOLIC BLOOD PRESSURE: 80 MMHG

## 2021-10-11 DIAGNOSIS — Z17.0 MALIGNANT NEOPLASM OF LOWER-OUTER QUADRANT OF RIGHT BREAST OF FEMALE, ESTROGEN RECEPTOR POSITIVE (H): ICD-10-CM

## 2021-10-11 DIAGNOSIS — C50.511 MALIGNANT NEOPLASM OF LOWER-OUTER QUADRANT OF RIGHT BREAST OF FEMALE, ESTROGEN RECEPTOR POSITIVE (H): Primary | ICD-10-CM

## 2021-10-11 DIAGNOSIS — Z17.0 MALIGNANT NEOPLASM OF LOWER-OUTER QUADRANT OF RIGHT BREAST OF FEMALE, ESTROGEN RECEPTOR POSITIVE (H): Primary | ICD-10-CM

## 2021-10-11 DIAGNOSIS — C50.511 MALIGNANT NEOPLASM OF LOWER-OUTER QUADRANT OF RIGHT BREAST OF FEMALE, ESTROGEN RECEPTOR POSITIVE (H): ICD-10-CM

## 2021-10-11 LAB
ALBUMIN SERPL-MCNC: 3.7 G/DL (ref 3.4–5)
ALP SERPL-CCNC: 56 U/L (ref 40–150)
ALT SERPL W P-5'-P-CCNC: 22 U/L (ref 0–50)
ANION GAP SERPL CALCULATED.3IONS-SCNC: 3 MMOL/L (ref 3–14)
AST SERPL W P-5'-P-CCNC: 17 U/L (ref 0–45)
BASOPHILS # BLD AUTO: 0.1 10E3/UL (ref 0–0.2)
BASOPHILS NFR BLD AUTO: 3 %
BILIRUB SERPL-MCNC: 0.5 MG/DL (ref 0.2–1.3)
BUN SERPL-MCNC: 17 MG/DL (ref 7–30)
CALCIUM SERPL-MCNC: 9.6 MG/DL (ref 8.5–10.1)
CHLORIDE BLD-SCNC: 110 MMOL/L (ref 94–109)
CO2 SERPL-SCNC: 27 MMOL/L (ref 20–32)
CREAT SERPL-MCNC: 0.7 MG/DL (ref 0.52–1.04)
EOSINOPHIL # BLD AUTO: 0.4 10E3/UL (ref 0–0.7)
EOSINOPHIL NFR BLD AUTO: 9 %
ERYTHROCYTE [DISTWIDTH] IN BLOOD BY AUTOMATED COUNT: 12.5 % (ref 10–15)
GFR SERPL CREATININE-BSD FRML MDRD: >90 ML/MIN/1.73M2
GLUCOSE BLD-MCNC: 78 MG/DL (ref 70–99)
HCT VFR BLD AUTO: 40.8 % (ref 35–47)
HGB BLD-MCNC: 13.1 G/DL (ref 11.7–15.7)
IMM GRANULOCYTES # BLD: 0 10E3/UL
IMM GRANULOCYTES NFR BLD: 0 %
LDH SERPL L TO P-CCNC: 174 U/L (ref 81–234)
LYMPHOCYTES # BLD AUTO: 1.5 10E3/UL (ref 0.8–5.3)
LYMPHOCYTES NFR BLD AUTO: 32 %
MCH RBC QN AUTO: 30.1 PG (ref 26.5–33)
MCHC RBC AUTO-ENTMCNC: 32.1 G/DL (ref 31.5–36.5)
MCV RBC AUTO: 94 FL (ref 78–100)
MONOCYTES # BLD AUTO: 0.5 10E3/UL (ref 0–1.3)
MONOCYTES NFR BLD AUTO: 11 %
NEUTROPHILS # BLD AUTO: 2.1 10E3/UL (ref 1.6–8.3)
NEUTROPHILS NFR BLD AUTO: 45 %
NRBC # BLD AUTO: 0 10E3/UL
NRBC BLD AUTO-RTO: 0 /100
PLATELET # BLD AUTO: 252 10E3/UL (ref 150–450)
POTASSIUM BLD-SCNC: 4.4 MMOL/L (ref 3.4–5.3)
PROT SERPL-MCNC: 7.3 G/DL (ref 6.8–8.8)
RBC # BLD AUTO: 4.35 10E6/UL (ref 3.8–5.2)
SODIUM SERPL-SCNC: 140 MMOL/L (ref 133–144)
WBC # BLD AUTO: 4.6 10E3/UL (ref 4–11)

## 2021-10-11 PROCEDURE — 36415 COLL VENOUS BLD VENIPUNCTURE: CPT | Mod: ZL | Performed by: INTERNAL MEDICINE

## 2021-10-11 PROCEDURE — 83615 LACTATE (LD) (LDH) ENZYME: CPT | Mod: ZL | Performed by: INTERNAL MEDICINE

## 2021-10-11 PROCEDURE — 255N000002 HC RX 255 OP 636: Performed by: RADIOLOGY

## 2021-10-11 PROCEDURE — 99205 OFFICE O/P NEW HI 60 MIN: CPT | Performed by: INTERNAL MEDICINE

## 2021-10-11 PROCEDURE — 82040 ASSAY OF SERUM ALBUMIN: CPT | Mod: ZL | Performed by: INTERNAL MEDICINE

## 2021-10-11 PROCEDURE — 85004 AUTOMATED DIFF WBC COUNT: CPT | Mod: ZL | Performed by: INTERNAL MEDICINE

## 2021-10-11 PROCEDURE — 86300 IMMUNOASSAY TUMOR CA 15-3: CPT | Mod: ZL | Performed by: INTERNAL MEDICINE

## 2021-10-11 PROCEDURE — G0463 HOSPITAL OUTPT CLINIC VISIT: HCPCS | Mod: 25

## 2021-10-11 PROCEDURE — 70553 MRI BRAIN STEM W/O & W/DYE: CPT | Mod: MG

## 2021-10-11 PROCEDURE — G0463 HOSPITAL OUTPT CLINIC VISIT: HCPCS

## 2021-10-11 PROCEDURE — A9585 GADOBUTROL INJECTION: HCPCS | Performed by: RADIOLOGY

## 2021-10-11 RX ORDER — GADOBUTROL 604.72 MG/ML
2 INJECTION INTRAVENOUS ONCE
Status: COMPLETED | OUTPATIENT
Start: 2021-10-11 | End: 2021-10-11

## 2021-10-11 RX ADMIN — GADOBUTROL 2 ML: 604.72 INJECTION INTRAVENOUS at 14:55

## 2021-10-11 RX ADMIN — GADOBUTROL 2 ML: 604.72 INJECTION INTRAVENOUS at 14:56

## 2021-10-11 ASSESSMENT — PATIENT HEALTH QUESTIONNAIRE - PHQ9: SUM OF ALL RESPONSES TO PHQ QUESTIONS 1-9: 0

## 2021-10-11 ASSESSMENT — PAIN SCALES - GENERAL: PAINLEVEL: NO PAIN (0)

## 2021-10-11 ASSESSMENT — MIFFLIN-ST. JEOR: SCORE: 1046.13

## 2021-10-11 NOTE — PATIENT INSTRUCTIONS
We would like to see you back are your PET Scan and Brain MRI. You have been referred to General Surgery for port placement.    When you are in need of a refill of your medications, please call your pharmacy and they will send us the request. If you have any questions please call [phone]

## 2021-10-11 NOTE — LETTER
October 12, 2021      Anne Marie Nix  [address] [place] [id number]      Dear Anne Marie,     Thank you for allowing our surgical team to participate in your care. Please review the instructions below.   If you have questions, you may contact us at the any of the following numbers:     [facility] Health Unit Coordinator: [phone]  Clinic Surgery Nurse (Whit): [phone]  Hospital Surgery Education Nurse: [phone]    You are scheduled for: Port Placement with Dr. Chen on 10/19/2021.  Admit Time: Hospital Surgery will call you the day before your procedure by 5pm with your arrival time.   If your surgery is on Monday, expect a call on Friday.   If you are not contacted before 5PM, please call admitting at [phone].   After hours or on weekends, please call [phone] to postpone.   Call the clinic surgery nurse if you become ill within 2 weeks of your procedure to reschedule.   This includes fever, chills, sore throat, cough, chest congestion, runny nose, or any other symptom of any other illness.     COVID-19 test is needed 4-5 days before procedure. This testing is done at the upper level of the [facility] (weekdays and weekends-East Entrance) or at the [facility] (weekday mornings only).  Follow the signage for parking and bring your mobile phone (if you have one) to call the phone number ([phone]) on the sign outside the testing site for check-in. Stay in your vehicle until you are directed to enter. If you do not have a cell phone, please call the nurse for instructions on checking in.   This has been scheduled for 10/15/2021 at 9:15 at the [facility] site.      Please call the Hospital Surgery Education Nurse at [phone] 1 week prior to your procedure and have a medication list ready.   Do not take Aspirin or other NSAIDS (Ibuprofen, Motrin, Aleve, Celebrex, Naproxen, etc), vitamins/supplements 7 days before your surgery unless you have been otherwise directed.     Shower  the night before and the morning of your procedure with Hibiclens/Chlorhexidine soap.  On The Night Before Your Surgery:  1.  Remove your jewelry and leave off until after surgery. Wash your hair and body with your regular soap/shampoo. Use a freshly washed washcloth. Include cleaning inside your belly button. Rinse off soap/shampoo.  2. Wash your body from neck to feet using 1/2 of the 1st Hibiclens (Chlorhexidine) bottle with your bare hands. Do not use Hibiclens on your face, hair or genital area. Leave the soap on your body for one minute and rinse. If you are under age 18, you should purchase and use Dial soap instead and use enough to generously cover your body.    3. Repeat step 2 with the 2nd half of the bottle (or additional Dial soap if under age 18).  4. Rinse off all soap and dry with a freshly washed towel. Do not use lotions or hair products.  5. Sleep in freshly washed pajamas and freshly washed bedding.  On The Morning of Your Surgery:  1.Wash your hair and body with your regular soap/shampoo. Use another freshly washed washcloth. Rinse off.   2. Wash your body from neck to feet using 1/2 of the 2nd Hibiclens (Chlorhexidine) bottle (or Dial soap if you are under age 18) with your bare hands. Leave the soap on your body for one minute and rinse.  3. Repeat step 2 with the 2nd half of the bottle (or additional Dial soap if under age 18).  4. Rinse off all the soap and dry with another freshly washed towel. Do not use lotions or hair products.  5. Wear freshly washed clothing to the hospital.    Do not have anything to eat or drink after midnight the night before your surgery (or 8 hours prior to surgery), except clear liquids (water, clear juice, clear broth, plain coffee or tea without cream or milk) up until 2 hours prior to arrival time, and then nothing by mouth.   Do not eat, drink, chew gum, suck on hard candy, or smoke after 2 hours prior to arrival. You can brush your teeth.   If you are  directed to take any medications, take them with a tiny sip of water.   If you use an inhaler, bring it with you.  Arrive in clean, comfortable clothing.   Do not wear any jewelry, make-up, nail polish, lotions, hair products, or perfumes.   [place] in hospital admitting through the [facility] entrance.  A responsible adult must be available to drive you home and stay with you for 24 hours at home. If you need to take a taxi or the bus, you must have another responsible adult to ride with you. Your procedure will be cancelled if you do not have a responsible adult .     Sincerely,      Reginaldo Chen MD/Whit RILEY LPN

## 2021-10-11 NOTE — NURSING NOTE
"Oncology Rooming Note    October 11, 2021 11:10 AM   Anne Marie Nix is a 66 year old female who presents for:    Chief Complaint   Patient presents with     Oncology Clinic Visit     Consult right breast cancer     Initial Vitals: /80   Pulse 89   Temp 97.9  F (36.6  C) (Tympanic)   Resp 20   Ht 1.6 m (5' 3\")   Wt 53.2 kg (117 lb 4.6 oz)   SpO2 98%   BMI 20.78 kg/m   Estimated body mass index is 20.78 kg/m  as calculated from the following:    Height as of this encounter: 1.6 m (5' 3\").    Weight as of this encounter: 53.2 kg (117 lb 4.6 oz). Body surface area is 1.54 meters squared.  No Pain (0) Comment: Data Unavailable   No LMP recorded. Patient is postmenopausal.  Allergies reviewed: Yes  Medications reviewed: Yes    Medications: Medication refills not needed today.     Patient was assessed using the NCCN psychosocial distress thermometer. Patient rated the score as a 0. Patient rated current stressors as none. Stressors will be brought to the attention of provider or Oncology RN Care Coordinator for a score of 6 or greater or per nurses discretion.             Pharmacy name entered into [employer]:    [facility] #[id number] - [place], [place] - [address] AT [address]  [facility] #[id number] - [place], [place] - [address] [facility] [address]  [facility] #[id number] - [place], [place] - [address] [facility] [address]  [facility] [address] - [place], [place] - [address]          Gaby Drew LPN            "

## 2021-10-11 NOTE — TELEPHONE ENCOUNTER
This patient was referred by Dr. Rolle for port placement. She was previously scheduled for port placement tomorrow at [facility], but oncology department has confirmed that the patient is cancelling at [facility] and wants to schedule here in [place] instead. Attempted to call patient to schedule, but she did not answer. Left message for patient to return call at [phone] or [phone]

## 2021-10-11 NOTE — PROGRESS NOTES
Visit Date: 10/11/2021    HEMATOLOGY ONCOLOGY CLINIC NOTE     REASON FOR CONSULTATION:  Invasive lobular carcinoma of the right breast.    REQUESTING PHYSICIANS:  Dr. Sofy Cobb, Dr. Reginaldo Chen, Dr. Kim Wheat.    Mrs. Nix is a 66-year-old white female with history of ADHD we were asked to evaluate concerning new diagnosis of lobular carcinoma of the right breast.  Apparently, she had palpated a mass in the right breast in 03/2021, underwent mammogram on 04/09/2021, which revealed an abnormality in the lower outer aspect of the right breast.  Ultrasound-guided biopsy was performed and was read as invasive lobular carcinoma, grade II, estrogen receptor was positive, progesterone receptor was negative, HER-2/kevin was negative.  The patient elected to be seen by Dr. Sofy Cobb at [facility].  She underwent right breast lumpectomy with sentinel lymph node dissection on 08/23/2021.  Pathology revealed that she had a 3 cm, grade 2 invasive lobular carcinoma.  Inferior lateral margin was positive for invasive carcinoma.  Three sentinel lymph nodes were obtained, one had micrometastases that measured 2 mm, the other had macrometastases.  She was therefore staged pathologic T2 N1a.  She apparently was considering bilateral mastectomies with reconstruction.  In the interim, she was referred to Dr. Solares Friday, who saw the patient initially on 09/15/2021.  He felt the patient had early first-stage breast cancer with positive margins.  He agreed with proceeding with completion mastectomy and that she would likely not benefit from additional axillary lymph node surgery.  He obtained an Oncotype DX test, and this came back 53 or high risk and he is recommending 4 cycles of dose-dense AC, followed by 12 weeks of Taxol.  The patient is here now for a second opinion.  In terms of risk factors of breast cancer, age of menarche was 14, she had 4 children all before the age of 31; her first one was at age 22,  she  them all.  She states that she underwent menopause in her 30s and was placed on Provera, but she was never on estrogen replacement therapy.  In terms of family history, mother had breast cancer at age 76.  Alcohol is negative.  Tobacco is negative.  Currently, she is asymptomatic.  She denies any fevers, night sweats, weight loss.  She does get back pain.  She is scheduled to have chemotherapy at [facility] on 10/19.  She is also scheduled to have a port placed tomorrow.  She wanted a second opinion.    PAST MEDICAL HISTORY:  As above, ADHD, shoulder stiffness, muscle weakness, multiple respiratory allergies.    SHE IS ALLERGIC TO VANCOMYCIN, AMOXICILLIN, CLAVULANIC ACID, LEVAQUIN.    CURRENT MEDICATIONS:  Include Ritalin 10 mg 3 times daily, Valtrex p.r.n., Bactroban 2% 3 times daily p.r.n., Dapsone 500% topical gel p.r.n.    SOCIAL HISTORY:  Tobacco is negative.  Alcohol is negative.  She is a retired LPN.  She worked in [employer] in the past.    FAMILY HISTORY:  Significant for mother with breast cancer and also mother with lung cancer.    REVIEW OF SYSTEMS:  CENTRAL NERVOUS SYSTEM:  Negative for headaches, change in mental status.  ENT:  Negative for hearing loss.  RESPIRATORY:  Negative for cough, shortness of breath, hemoptysis.  CARDIAC:  Negative for chest pain, palpitations, orthopnea, PND, ankle edema.  GASTROINTESTINAL:  Negative for change in bowel habits, bright red blood per rectum, hematemesis.  MUSCULOSKELETAL:  Significant for chronic low back pain.  GENITOURINARY:  Negative for hematuria.  CONSTITUTIONAL:  Negative for fevers, occasional night sweats, no weight loss.  HEMATOLOGIC:  Negative for easy bruisability, gingival bleeding, epistaxis.    PHYSICAL EXAMINATION:    GENERAL:  She is a well-developed, well-nourished, middle-aged white female in no acute distress.  VITAL SIGNS:  Reveal blood pressure 120/80, pulse 79, respirations 20, temp 97.9.  HEENT:  Atraumatic, normocephalic.   Oropharynx is nonerythematous.  NECK:  Supple.  LUNGS:  Clear to auscultation and percussion.  BREASTS:  Deferred.  ABDOMEN:  Soft, normoactive bowel sounds, no mass, nontender.  LYMPHATICS:  No cervical, supraclavicular, axillary or inguinal nodes.  EXTREMITIES:  No edema.  NEUROLOGIC:  Nonfocal.    LABORATORY DATA:  CBC:  White count 4.6, H and H 13.1 and 40.8, platelet count 252.    IMPRESSION:  Invasive lobular carcinoma of the right breast, 3 cm breast mass, ER positive, UT negative, HER-2 negative.  Oncotype DX recurrence score 53 with 1 sentinel lymph node with macrometastases, the other with micrometastases.  Staging is incomplete.  We would like to obtain a PET scan and MRI of the brain to confirm that she is pathologic stage IIB high risk.  Given her high risk Oncotype, we would recommend adjuvant chemotherapy.  Assuming PET scan and MRI of the brain negative, we would recommend dose-dense AC followed by weekly Taxol x12 weeks.  We agree with Dr. Colunga's chemotherapy plan.  The patient apparently is interested in switching to [place] for treatment.  She has had an echocardiogram, which revealed normal ejection fraction of 65%.  Assuming PET scan and MRI of the brain are negative, we will proceed with dose-dense AC x4 cycles, followed by weekly Taxol x12 weeks, as studies have shown that this improves disease-free survival particularly in high risk postmenopausal, ER positive patients.  The patient is willing to proceed.     One-hundred-five minutes were spent on this patient; time was spent reviewing multiple physician provider notes, performing history and physical, documenting history and physical, discussing treatment strategies for this patient, staging strategies, performing history and physical, documenting history and physical, ordering PET scan and MRI of the brain and also obtaining a consult for port placement.      Mingo Rolle MD        D: 10/11/2021   T: 10/11/2021   MT:  [id number]    Name:     JORGE VAZQUEZ  MRN:      [id number]        Account:    [id number]   :      1955           Visit Date: 10/11/2021     Document: [id number]    cc:  MD Beck Naidu MD Nancy L. [place], APRN, CNP   Sofy Cobb, DO

## 2021-10-12 ENCOUNTER — PREP FOR PROCEDURE (OUTPATIENT)
Dept: SURGERY | Facility: OTHER | Age: 66
End: 2021-10-12

## 2021-10-12 DIAGNOSIS — C50.911 INVASIVE LOBULAR CARCINOMA OF BREAST, STAGE 1, RIGHT (H): Primary | ICD-10-CM

## 2021-10-12 LAB — CANCER AG27-29 SERPL-ACNC: 23 U/ML (ref 0–39)

## 2021-10-12 NOTE — TELEPHONE ENCOUNTER
Called patient on cell. Scheduled for port placement 10/19 with Dr. Chen. Instructions reviewed on phone and sent via my-chart. Patient reports already has 2 bottles of hibiclens. COVID test scheduled.

## 2021-10-13 ENCOUNTER — HOSPITAL ENCOUNTER (OUTPATIENT)
Dept: PET IMAGING | Facility: HOSPITAL | Age: 66
Discharge: HOME | End: 2021-10-13
Attending: INTERNAL MEDICINE | Admitting: INTERNAL MEDICINE
Payer: MEDICARE

## 2021-10-13 DIAGNOSIS — Z17.0 MALIGNANT NEOPLASM OF LOWER-OUTER QUADRANT OF RIGHT BREAST OF FEMALE, ESTROGEN RECEPTOR POSITIVE (H): ICD-10-CM

## 2021-10-13 DIAGNOSIS — C50.511 MALIGNANT NEOPLASM OF LOWER-OUTER QUADRANT OF RIGHT BREAST OF FEMALE, ESTROGEN RECEPTOR POSITIVE (H): ICD-10-CM

## 2021-10-13 PROCEDURE — 343N000001 HC RX 343: Performed by: INTERNAL MEDICINE

## 2021-10-13 PROCEDURE — A9552 F18 FDG: HCPCS | Performed by: INTERNAL MEDICINE

## 2021-10-13 PROCEDURE — 78815 PET IMAGE W/CT SKULL-THIGH: CPT | Mod: PS,MG

## 2021-10-13 RX ADMIN — FLUDEOXYGLUCOSE F-18 11.43 MCI.: 500 INJECTION, SOLUTION INTRAVENOUS at 10:32

## 2021-10-14 ENCOUNTER — ANESTHESIA EVENT (OUTPATIENT)
Dept: SURGERY | Facility: HOSPITAL | Age: 66
End: 2021-10-14
Payer: MEDICARE

## 2021-10-14 NOTE — ANESTHESIA PREPROCEDURE EVALUATION
Anesthesia Pre-Procedure Evaluation    Patient: Anne Marie Nix   MRN: [id number] : 1955        Preoperative Diagnosis: Invasive lobular carcinoma of breast, stage 1, right (H) [C50.911]    Procedure : Procedure(s):  port-a-cath placement          Past Medical History:   Diagnosis Date     Acne vulgaris      Cancer (H)      Complication of anesthesia     nauseated     Premenstrual tension syndromes 10/14/2004      Past Surgical History:   Procedure Laterality Date     C OBSTETRICAL D&C       LUMPECTOMY BREAST Right 2021      Allergies   Allergen Reactions     Vancomycin Other (See Comments)     Legs went numb- side effect     Amoxicillin Unknown     Headache  Stomach aches  Augmentin     Amoxicillin Trihydrate Other (See Comments)     Headache  Stomach aches  Augmentin         Clavulanic Acid Potassium Other (See Comments)     Headaches  Stomach aches  Augmentin       Levaquin [Levofloxacin] Other (See Comments)     Numbness in leg      Social History     Tobacco Use     Smoking status: Never Smoker     Smokeless tobacco: Never Used   Substance Use Topics     Alcohol use: No      Wt Readings from Last 1 Encounters:   10/11/21 53.2 kg (117 lb 4.6 oz)        Anesthesia Evaluation   Pt has had prior anesthetic.     History of anesthetic complications (declines scop patch, made her dizzy previously)  - PONV.      ROS/MED HX  ENT/Pulmonary:  - neg pulmonary ROS     Neurologic:  - neg neurologic ROS     Cardiovascular:  - neg cardiovascular ROS     METS/Exercise Tolerance: >4 METS    Hematologic:  - neg hematologic  ROS     Musculoskeletal:  - neg musculoskeletal ROS     GI/Hepatic:  - neg GI/hepatic ROS     Renal/Genitourinary:  - neg Renal ROS     Endo:  - neg endo ROS     Psychiatric/Substance Use:     (+) psychiatric history other (comment) (ADHD)     Infectious Disease:  - neg infectious disease ROS     Malignancy:   (+) Malignancy (plan for chemo), History of Breast.    Other:  - neg other ROS           Physical Exam    Airway  airway exam normal      Mallampati: II   TM distance: > 3 FB   Neck ROM: full   Mouth opening: > 3 cm    Respiratory Devices and Support         Dental  no notable dental history         Cardiovascular   cardiovascular exam normal       Rhythm and rate: regular and normal     Pulmonary   pulmonary exam normal        breath sounds clear to auscultation           OUTSIDE LABS:  CBC:   Lab Results   Component Value Date    WBC 4.6 10/11/2021    WBC 5.3 04/21/2021    HGB 13.1 10/11/2021    HGB 12.6 04/21/2021    HCT 40.8 10/11/2021    HCT 39.6 04/21/2021     10/11/2021     04/21/2021     BMP:   Lab Results   Component Value Date     10/11/2021     04/21/2021    POTASSIUM 4.4 10/11/2021    POTASSIUM 4.1 04/21/2021    CHLORIDE 110 (H) 10/11/2021    CHLORIDE 108 04/21/2021    CO2 27 10/11/2021    CO2 26 04/21/2021    BUN 17 10/11/2021    BUN 21 04/21/2021    CR 0.70 10/11/2021    CR 0.66 04/21/2021    GLC 78 10/11/2021    GLC 83 04/21/2021     COAGS: No results found for: PTT, INR, FIBR  POC: No results found for: BGM, HCG, HCGS  HEPATIC:   Lab Results   Component Value Date    ALBUMIN 3.7 10/11/2021    PROTTOTAL 7.3 10/11/2021    ALT 22 10/11/2021    AST 17 10/11/2021    ALKPHOS 56 10/11/2021    BILITOTAL 0.5 10/11/2021     OTHER:   Lab Results   Component Value Date    GABE 9.6 10/11/2021    TSH 1.63 12/09/2020       Anesthesia Plan    ASA Status:  2   NPO Status:  NPO Appropriate (water and black coffee at 0945)    Anesthesia Type: MAC.   Induction: Intravenous, Propofol.   Maintenance: Balanced.        Consents    Anesthesia Plan(s) and associated risks, benefits, and realistic alternatives discussed. Questions answered and patient/representative(s) expressed understanding.     - Discussed with:  Patient      - Extended Intubation/Ventilatory Support Discussed: No.      - Patient is DNR/DNI Status: No    Use of blood products discussed: No .     Postoperative  Care    Pain management: IV analgesics.   PONV prophylaxis: Ondansetron (or other 5HT-3), Dexamethasone or Solumedrol     Comments:    H&P 10/18 Dr. Rolle, no interval changes    Risks and benefits of MAC anesthetic discussed including dental damage, aspiration, loss of airway, conversion to general anesthetic, CV complications, MI, stroke, death. Pt wishes to proceed.            DEEPA Rivera CRNA

## 2021-10-15 ENCOUNTER — OFFICE VISIT (OUTPATIENT)
Dept: FAMILY MEDICINE | Facility: OTHER | Age: 66
End: 2021-10-15
Attending: SURGERY
Payer: MEDICARE

## 2021-10-15 DIAGNOSIS — Z01.818 PRE-OP EXAM: Primary | ICD-10-CM

## 2021-10-15 PROCEDURE — U0003 INFECTIOUS AGENT DETECTION BY NUCLEIC ACID (DNA OR RNA); SEVERE ACUTE RESPIRATORY SYNDROME CORONAVIRUS 2 (SARS-COV-2) (CORONAVIRUS DISEASE [COVID-19]), AMPLIFIED PROBE TECHNIQUE, MAKING USE OF HIGH THROUGHPUT TECHNOLOGIES AS DESCRIBED BY CMS-2020-01-R: HCPCS | Mod: ZL

## 2021-10-16 LAB — SARS-COV-2 RNA RESP QL NAA+PROBE: NEGATIVE

## 2021-10-18 ENCOUNTER — ONCOLOGY VISIT (OUTPATIENT)
Dept: ONCOLOGY | Facility: OTHER | Age: 66
End: 2021-10-18
Attending: INTERNAL MEDICINE
Payer: MEDICARE

## 2021-10-18 VITALS
TEMPERATURE: 97.9 F | DIASTOLIC BLOOD PRESSURE: 72 MMHG | HEIGHT: 63 IN | RESPIRATION RATE: 20 BRPM | WEIGHT: 119.71 LBS | HEART RATE: 96 BPM | SYSTOLIC BLOOD PRESSURE: 100 MMHG | BODY MASS INDEX: 21.21 KG/M2 | OXYGEN SATURATION: 99 %

## 2021-10-18 DIAGNOSIS — C50.511 MALIGNANT NEOPLASM OF LOWER-OUTER QUADRANT OF RIGHT BREAST OF FEMALE, ESTROGEN RECEPTOR POSITIVE (H): Primary | ICD-10-CM

## 2021-10-18 DIAGNOSIS — Z17.0 MALIGNANT NEOPLASM OF LOWER-OUTER QUADRANT OF RIGHT BREAST OF FEMALE, ESTROGEN RECEPTOR POSITIVE (H): Primary | ICD-10-CM

## 2021-10-18 PROCEDURE — 99215 OFFICE O/P EST HI 40 MIN: CPT | Performed by: INTERNAL MEDICINE

## 2021-10-18 PROCEDURE — 99417 PROLNG OP E/M EACH 15 MIN: CPT | Performed by: INTERNAL MEDICINE

## 2021-10-18 PROCEDURE — G0463 HOSPITAL OUTPT CLINIC VISIT: HCPCS

## 2021-10-18 RX ORDER — PROCHLORPERAZINE MALEATE 10 MG
10 TABLET ORAL EVERY 6 HOURS PRN
Qty: 30 TABLET | Refills: 5 | Status: SHIPPED | OUTPATIENT
Start: 2021-10-18 | End: 2021-11-10

## 2021-10-18 RX ORDER — DEXAMETHASONE 4 MG/1
8 TABLET ORAL DAILY
Qty: 6 TABLET | Refills: 3 | Status: SHIPPED | OUTPATIENT
Start: 2021-10-18 | End: 2021-11-10

## 2021-10-18 ASSESSMENT — PAIN SCALES - GENERAL: PAINLEVEL: NO PAIN (0)

## 2021-10-18 ASSESSMENT — MIFFLIN-ST. JEOR: SCORE: 1057.13

## 2021-10-18 NOTE — PROGRESS NOTES
Visit Date: 10/18/2021    HEMATOLOGY/ONCOLOGY CLINIC NOTE     HISTORY OF PRESENT ILLNESS:  Mrs. Nix returns for followup of invasive lobular carcinoma of the right breast.  We had seen the patient in consultation on 10/11/2021.  At that time, we were asked to see the patient at the request of Dr. Sofy Cobb and Dr. Kim Wheat. At that time, Mrs. Nix was a 66-year-old white female with history of ADHD whom were asked to evaluate concerning new diagnosis of lobular carcinoma of the right breast.  Apparently, she had palpated a mass in the right breast in 03/2021.  She underwent mammogram 04/09/2021 which revealed an abnormality in the lower outer aspect of the right breast.  Ultrasound-guided biopsy was performed and was read as invasive lobular carcinoma, grade 2,  estrogen receptor was positive, progesterone receptor was negative, HER-2/kevin was negative.  The patient elected to be seen by Dr. Sofy Cobb at [facility] [place].  She underwent right breast lumpectomy with sentinel lymph node dissection on 08/23/2021.  Pathology revealed that she had a 3 cm, grade 2 invasive lobular carcinoma. Inferior lateral margin was positive for invasive carcinoma.  Three sentinel lymph nodes were obtained, 1 had micrometastases that measured 2 mm, the other had macrometastases.  She was therefore staged pathologic T2 N1a.  She apparently was considering bilateral mastectomy with reconstruction.  In the interim, she was referred to Dr. Nunez Friday of Medical Oncology at [facility] who saw the patient initially on 09/15/2021.  He felt the patient had early first-stage breast cancer with a positive margin.  He agreed of proceeding with completion mastectomy and she would likely not benefit from additional axillary lymph node surgery obtain.  He obtained an Oncotype DX test, which came back 53 or high risk.  He recommended 4 cycles of dose-dense AC followed by 12 weeks of Taxol.  The patient wanted a second  opinion in terms of risk factors and was seen by us for that reason.  In terms of risk factors for breast cancer, age of menarche was 14.  She had 4 children all before the age of 31, first was at age 22.  She  them all.  She states that she underwent menopause in her 30s and was placed on Provera, but she was never on estrogen replacement therapy.  In terms of family history, mother had breast cancer at age 76.  Alcohol was negative.  Tobacco was negative.  She was asymptomatic.  She denies any fevers, night sweats, weight loss.  She did have some back pain.  She was scheduled to have chemotherapy at [facility] in 10/19 and was scheduled to have a port placed as well.  She wanted a second opinion.      When we saw the patient, we wanted to adequately stage the patient with a PET scan and MRI of the brain and this was done on 10/13 and the findings were that there were postoperative changes consistent with history of right breast lumpectomy and axillary node dissection.  Otherwise, no evidence of metastatic disease.  MRI of the brain was also negative.  The patient otherwise would be a candidate for dose-dense AC x4 cycles followed by weekly Taxol x12.  The patient is here to initiate chemotherapy.  She denies any fevers, night sweats, weight loss, abdominal pain, chest pain, headaches, bone pain.  Overall, she is doing well.    PHYSICAL EXAMINATION:    GENERAL:  She is a middle-aged white female in no acute distress.  VITAL SIGNS:  Blood pressure 172, pulse 96, respirations 20, temperature 97.9.  HEENT:  Atraumatic, normocephalic.  Oropharynx nonerythematous.  NECK:  Supple.  LUNGS:  Clear to auscultation and percussion.    HEART:  Regular rate and rhythm.  S1, S2 normal.  ABDOMEN:  Soft, normoactive bowel sounds.  No mass, nontender.  LYMPHATICS:  No cervical, supraclavicular, axillary or inguinal nodes.  EXTREMITIES:  No edema.  NEUROLOGIC:  Nonfocal.    LABORATORY DATA:  CBC with white count 4.6, H and H  13.1 and 40.8, platelet count is 252.  CA 27-29 is 23.    IMPRESSION:  Invasive lobular carcinoma of the right breast, 2 cm breast mass, ER positive, NM negative, HER-2/kevin negative. Oncotype DX recurrence score of is 53.  One sentinel lymph node with macrometastases, the other with micrometastases.  Staging is incomplete.  PET scan and MRI of the brain were negative for metastatic disease.  The patient is therefore pathologic stage IIB, high risk invasive lobular carcinoma of the right breast.  Given her high risk Oncotype, we have recommended adjuvant chemotherapy with dose-dense AC x4 cycles with Onpro support followed by 12 weeks of Taxol 80 mg/m2.  The patient is willing to proceed.  She did have an echocardiogram that had a normal ejection fraction 65%.  Discussed side effects of Adriamycin including risk of cardiomyopathy, cytopenias, nausea, vomiting, alopecia and Taxol side effects including neuropathy, risk of infection.  The patient is willing to proceed.    Eight-four minutes was spent on this patient.  Time was spent reviewing the PET scan imaging results, performing history and physical, documenting history and physical, reviewing MRI of the brain results, ordering chemotherapy and followup labs.      Mingo Rolle MD        D: 10/18/2021   T: 10/18/2021   MT: [id number]    Name:     JORGE VAZQUEZ  MRN:      [id number]        Account:    [id number]   :      1955           Visit Date: 10/18/2021     Document: [id number]

## 2021-10-18 NOTE — H&P (VIEW-ONLY)
Visit Date: 10/18/2021    HEMATOLOGY/ONCOLOGY CLINIC NOTE     HISTORY OF PRESENT ILLNESS:  Mrs. Nix returns for followup of invasive lobular carcinoma of the right breast.  We had seen the patient in consultation on 10/11/2021.  At that time, we were asked to see the patient at the request of Dr. Sofy Cobb and Dr. Kim Wheat. At that time, Mrs. Nix was a 66-year-old white female with history of ADHD whom were asked to evaluate concerning new diagnosis of lobular carcinoma of the right breast.  Apparently, she had palpated a mass in the right breast in 03/2021.  She underwent mammogram 04/09/2021 which revealed an abnormality in the lower outer aspect of the right breast.  Ultrasound-guided biopsy was performed and was read as invasive lobular carcinoma, grade 2,  estrogen receptor was positive, progesterone receptor was negative, HER-2/kevin was negative.  The patient elected to be seen by Dr. Sofy Cobb at [facility].  She underwent right breast lumpectomy with sentinel lymph node dissection on 08/23/2021.  Pathology revealed that she had a 3 cm, grade 2 invasive lobular carcinoma. Inferior lateral margin was positive for invasive carcinoma.  Three sentinel lymph nodes were obtained, 1 had micrometastases that measured 2 mm, the other had macrometastases.  She was therefore staged pathologic T2 N1a.  She apparently was considering bilateral mastectomy with reconstruction.  In the interim, she was referred to Dr. Nunez Friday of Medical Oncology at [facility] who saw the patient initially on 09/15/2021.  He felt the patient had early first-stage breast cancer with a positive margin.  He agreed of proceeding with completion mastectomy and she would likely not benefit from additional axillary lymph node surgery obtain.  He obtained an Oncotype DX test, which came back 53 or high risk.  He recommended 4 cycles of dose-dense AC followed by 12 weeks of Taxol.  The patient wanted a second  opinion in terms of risk factors and was seen by us for that reason.  In terms of risk factors for breast cancer, age of menarche was 14.  She had 4 children all before the age of 31, first was at age 22.  She  them all.  She states that she underwent menopause in her 30s and was placed on Provera, but she was never on estrogen replacement therapy.  In terms of family history, mother had breast cancer at age 76.  Alcohol was negative.  Tobacco was negative.  She was asymptomatic.  She denies any fevers, night sweats, weight loss.  She did have some back pain.  She was scheduled to have chemotherapy at [facility] in 10/19 and was scheduled to have a port placed as well.  She wanted a second opinion.      When we saw the patient, we wanted to adequately stage the patient with a PET scan and MRI of the brain and this was done on 10/13 and the findings were that there were postoperative changes consistent with history of right breast lumpectomy and axillary node dissection.  Otherwise, no evidence of metastatic disease.  MRI of the brain was also negative.  The patient otherwise would be a candidate for dose-dense AC x4 cycles followed by weekly Taxol x12.  The patient is here to initiate chemotherapy.  She denies any fevers, night sweats, weight loss, abdominal pain, chest pain, headaches, bone pain.  Overall, she is doing well.    PHYSICAL EXAMINATION:    GENERAL:  She is a middle-aged white female in no acute distress.  VITAL SIGNS:  Blood pressure 172, pulse 96, respirations 20, temperature 97.9.  HEENT:  Atraumatic, normocephalic.  Oropharynx nonerythematous.  NECK:  Supple.  LUNGS:  Clear to auscultation and percussion.    HEART:  Regular rate and rhythm.  S1, S2 normal.  ABDOMEN:  Soft, normoactive bowel sounds.  No mass, nontender.  LYMPHATICS:  No cervical, supraclavicular, axillary or inguinal nodes.  EXTREMITIES:  No edema.  NEUROLOGIC:  Nonfocal.    LABORATORY DATA:  CBC with white count 4.6, H and H  13.1 and 40.8, platelet count is 252.  CA 27-29 is 23.    IMPRESSION:  Invasive lobular carcinoma of the right breast, 2 cm breast mass, ER positive, FL negative, HER-2/kevin negative. Oncotype DX recurrence score of is 53.  One sentinel lymph node with macrometastases, the other with micrometastases.  Staging is incomplete.  PET scan and MRI of the brain were negative for metastatic disease.  The patient is therefore pathologic stage IIB, high risk invasive lobular carcinoma of the right breast.  Given her high risk Oncotype, we have recommended adjuvant chemotherapy with dose-dense AC x4 cycles with Onpro support followed by 12 weeks of Taxol 80 mg/m2.  The patient is willing to proceed.  She did have an echocardiogram that had a normal ejection fraction 65%.  Discussed side effects of Adriamycin including risk of cardiomyopathy, cytopenias, nausea, vomiting, alopecia and Taxol side effects including neuropathy, risk of infection.  The patient is willing to proceed.    Eight-four minutes was spent on this patient.  Time was spent reviewing the PET scan imaging results, performing history and physical, documenting history and physical, reviewing MRI of the brain results, ordering chemotherapy and followup labs.      Mingo Rolle MD        D: 10/18/2021   T: 10/18/2021   MT: [id number]    Name:     JORGE VAZQUEZ  MRN:      [id number]        Account:    [id number]   :      1955           Visit Date: 10/18/2021     Document: [id number]

## 2021-10-19 ENCOUNTER — APPOINTMENT (OUTPATIENT)
Dept: GENERAL RADIOLOGY | Facility: HOSPITAL | Age: 66
End: 2021-10-19
Attending: SURGERY
Payer: MEDICARE

## 2021-10-19 ENCOUNTER — ANESTHESIA (OUTPATIENT)
Dept: SURGERY | Facility: HOSPITAL | Age: 66
End: 2021-10-19
Payer: MEDICARE

## 2021-10-19 ENCOUNTER — HOSPITAL ENCOUNTER (OUTPATIENT)
Facility: HOSPITAL | Age: 66
Discharge: HOME | End: 2021-10-19
Attending: SURGERY | Admitting: SURGERY
Payer: MEDICARE

## 2021-10-19 VITALS
HEIGHT: 63 IN | HEART RATE: 85 BPM | DIASTOLIC BLOOD PRESSURE: 87 MMHG | TEMPERATURE: 97.8 F | SYSTOLIC BLOOD PRESSURE: 130 MMHG | WEIGHT: 114 LBS | BODY MASS INDEX: 20.2 KG/M2 | OXYGEN SATURATION: 91 % | RESPIRATION RATE: 16 BRPM

## 2021-10-19 DIAGNOSIS — C50.911 INVASIVE LOBULAR CARCINOMA OF BREAST, STAGE 1, RIGHT (H): ICD-10-CM

## 2021-10-19 PROCEDURE — 250N000009 HC RX 250: Performed by: SURGERY

## 2021-10-19 PROCEDURE — C1788 PORT, INDWELLING, IMP: HCPCS | Performed by: SURGERY

## 2021-10-19 PROCEDURE — 272N000001 HC OR GENERAL SUPPLY STERILE: Performed by: SURGERY

## 2021-10-19 PROCEDURE — 370N000017 HC ANESTHESIA TECHNICAL FEE, PER MIN: Performed by: SURGERY

## 2021-10-19 PROCEDURE — 710N000012 HC RECOVERY PHASE 2, PER MINUTE: Performed by: SURGERY

## 2021-10-19 PROCEDURE — 999N000141 HC STATISTIC PRE-PROCEDURE NURSING ASSESSMENT: Performed by: SURGERY

## 2021-10-19 PROCEDURE — 36561 INSERT TUNNELED CV CATH: CPT | Performed by: SURGERY

## 2021-10-19 PROCEDURE — 36561 INSERT TUNNELED CV CATH: CPT | Performed by: NURSE ANESTHETIST, CERTIFIED REGISTERED

## 2021-10-19 PROCEDURE — 250N000011 HC RX IP 250 OP 636: Performed by: NURSE ANESTHETIST, CERTIFIED REGISTERED

## 2021-10-19 PROCEDURE — 250N000011 HC RX IP 250 OP 636: Performed by: SURGERY

## 2021-10-19 PROCEDURE — 360N000083 HC SURGERY LEVEL 3 W/ FLUORO, PER MIN: Performed by: SURGERY

## 2021-10-19 PROCEDURE — 999N000182 XR SURGERY CARM FLUORO GREATER THAN 5 MIN: Mod: TC

## 2021-10-19 PROCEDURE — 250N000009 HC RX 250: Performed by: NURSE ANESTHETIST, CERTIFIED REGISTERED

## 2021-10-19 PROCEDURE — 258N000003 HC RX IP 258 OP 636: Performed by: SURGERY

## 2021-10-19 PROCEDURE — 258N000003 HC RX IP 258 OP 636: Performed by: NURSE ANESTHETIST, CERTIFIED REGISTERED

## 2021-10-19 DEVICE — PORT-IMPLANTABLE POWER PORT: Type: IMPLANTABLE DEVICE | Site: CHEST | Status: FUNCTIONAL

## 2021-10-19 RX ORDER — PROPOFOL 10 MG/ML
INJECTION, EMULSION INTRAVENOUS CONTINUOUS PRN
Status: DISCONTINUED | OUTPATIENT
Start: 2021-10-19 | End: 2021-10-19

## 2021-10-19 RX ORDER — PROPOFOL 10 MG/ML
INJECTION, EMULSION INTRAVENOUS PRN
Status: DISCONTINUED | OUTPATIENT
Start: 2021-10-19 | End: 2021-10-19

## 2021-10-19 RX ORDER — ALBUTEROL SULFATE 0.83 MG/ML
2.5 SOLUTION RESPIRATORY (INHALATION) EVERY 4 HOURS PRN
Status: DISCONTINUED | OUTPATIENT
Start: 2021-10-19 | End: 2021-10-19 | Stop reason: HOSPADM

## 2021-10-19 RX ORDER — SODIUM CHLORIDE, SODIUM LACTATE, POTASSIUM CHLORIDE, CALCIUM CHLORIDE 600; 310; 30; 20 MG/100ML; MG/100ML; MG/100ML; MG/100ML
INJECTION, SOLUTION INTRAVENOUS CONTINUOUS
Status: DISCONTINUED | OUTPATIENT
Start: 2021-10-19 | End: 2021-10-19 | Stop reason: HOSPADM

## 2021-10-19 RX ORDER — ONDANSETRON 2 MG/ML
4 INJECTION INTRAMUSCULAR; INTRAVENOUS EVERY 30 MIN PRN
Status: DISCONTINUED | OUTPATIENT
Start: 2021-10-19 | End: 2021-10-19 | Stop reason: HOSPADM

## 2021-10-19 RX ORDER — CLINDAMYCIN PHOSPHATE 900 MG/50ML
900 INJECTION, SOLUTION INTRAVENOUS SEE ADMIN INSTRUCTIONS
Status: DISCONTINUED | OUTPATIENT
Start: 2021-10-19 | End: 2021-10-19 | Stop reason: HOSPADM

## 2021-10-19 RX ORDER — ONDANSETRON 4 MG/1
4 TABLET, ORALLY DISINTEGRATING ORAL EVERY 30 MIN PRN
Status: DISCONTINUED | OUTPATIENT
Start: 2021-10-19 | End: 2021-10-19 | Stop reason: HOSPADM

## 2021-10-19 RX ORDER — HEPARIN SODIUM 1000 [USP'U]/ML
2500 INJECTION, SOLUTION INTRAVENOUS; SUBCUTANEOUS
Status: DISCONTINUED | OUTPATIENT
Start: 2021-10-19 | End: 2021-10-19 | Stop reason: HOSPADM

## 2021-10-19 RX ORDER — CLINDAMYCIN PHOSPHATE 900 MG/50ML
900 INJECTION, SOLUTION INTRAVENOUS
Status: COMPLETED | OUTPATIENT
Start: 2021-10-19 | End: 2021-10-19

## 2021-10-19 RX ORDER — ONDANSETRON 2 MG/ML
INJECTION INTRAMUSCULAR; INTRAVENOUS PRN
Status: DISCONTINUED | OUTPATIENT
Start: 2021-10-19 | End: 2021-10-19

## 2021-10-19 RX ORDER — LIDOCAINE HYDROCHLORIDE 20 MG/ML
INJECTION, SOLUTION INFILTRATION; PERINEURAL PRN
Status: DISCONTINUED | OUTPATIENT
Start: 2021-10-19 | End: 2021-10-19

## 2021-10-19 RX ORDER — HEPARIN SODIUM 1000 [USP'U]/ML
INJECTION, SOLUTION INTRAVENOUS; SUBCUTANEOUS PRN
Status: DISCONTINUED | OUTPATIENT
Start: 2021-10-19 | End: 2021-10-19 | Stop reason: HOSPADM

## 2021-10-19 RX ORDER — LIDOCAINE 40 MG/G
CREAM TOPICAL
Status: DISCONTINUED | OUTPATIENT
Start: 2021-10-19 | End: 2021-10-19 | Stop reason: HOSPADM

## 2021-10-19 RX ORDER — LABETALOL 20 MG/4 ML (5 MG/ML) INTRAVENOUS SYRINGE
10
Status: DISCONTINUED | OUTPATIENT
Start: 2021-10-19 | End: 2021-10-19 | Stop reason: HOSPADM

## 2021-10-19 RX ADMIN — ONDANSETRON 4 MG: 2 INJECTION INTRAMUSCULAR; INTRAVENOUS at 15:02

## 2021-10-19 RX ADMIN — SODIUM CHLORIDE, POTASSIUM CHLORIDE, SODIUM LACTATE AND CALCIUM CHLORIDE: 600; 310; 30; 20 INJECTION, SOLUTION INTRAVENOUS at 12:58

## 2021-10-19 RX ADMIN — CLINDAMYCIN PHOSPHATE 900 MG: 900 INJECTION, SOLUTION INTRAVENOUS at 14:35

## 2021-10-19 RX ADMIN — PROPOFOL 40 MG: 10 INJECTION, EMULSION INTRAVENOUS at 14:59

## 2021-10-19 RX ADMIN — PROPOFOL 100 MCG/KG/MIN: 10 INJECTION, EMULSION INTRAVENOUS at 14:47

## 2021-10-19 RX ADMIN — PROPOFOL 50 MG: 10 INJECTION, EMULSION INTRAVENOUS at 14:47

## 2021-10-19 RX ADMIN — PROPOFOL 50 MG: 10 INJECTION, EMULSION INTRAVENOUS at 14:58

## 2021-10-19 RX ADMIN — LIDOCAINE HYDROCHLORIDE 40 MG: 20 INJECTION, SOLUTION INFILTRATION; PERINEURAL at 14:47

## 2021-10-19 ASSESSMENT — MIFFLIN-ST. JEOR: SCORE: 1031.23

## 2021-10-19 NOTE — OR NURSING
Patient and responsible adult given discharge instructions with no questions regarding instructions. Sidney score 20/20. Pain level 0/10.  Discharged from unit via walking. Patient discharged to home with .

## 2021-10-19 NOTE — ANESTHESIA POSTPROCEDURE EVALUATION
Patient: Anne Marie Nix    Procedure: Procedure(s):  port-a-cath placement       Diagnosis:Invasive lobular carcinoma of breast, stage 1, right (H) [C50.911]  Diagnosis Additional Information: No value filed.    Anesthesia Type:  MAC    Note:  Disposition: Outpatient   Postop Pain Control: Uneventful            Sign Out: Well controlled pain   PONV: No   Neuro/Psych: Uneventful            Sign Out: Acceptable/Baseline neuro status   Airway/Respiratory: Uneventful            Sign Out: Acceptable/Baseline resp. status   CV/Hemodynamics: Uneventful            Sign Out: Acceptable CV status; No obvious hypovolemia; No obvious fluid overload   Other NRE: NONE   DID A NON-ROUTINE EVENT OCCUR? No           Last vitals:  Vitals Value Taken Time   /87 10/19/21 1545   Temp 97.8  F (36.6  C) 10/19/21 1535   Pulse 85 10/19/21 1545   Resp 16 10/19/21 1535   SpO2 95 % 10/19/21 1557   Vitals shown include unvalidated device data.    Electronically Signed By: DEEPA Farrell CRNA  October 19, 2021  3:58 PM

## 2021-10-19 NOTE — ANESTHESIA CARE TRANSFER NOTE
Patient: Anne Marie Nix    Procedure: Procedure(s):  port-a-cath placement       Diagnosis: Invasive lobular carcinoma of breast, stage 1, right (H) [C50.911]  Diagnosis Additional Information: No value filed.    Anesthesia Type:   MAC     Note:    Oropharynx: oropharynx clear of all foreign objects and spontaneously breathing  Level of Consciousness: drowsy  Oxygen Supplementation: nasal cannula  Level of Supplemental Oxygen (L/min / FiO2): 2  Independent Airway: airway patency satisfactory and stable  Dentition: dentition unchanged  Vital Signs Stable: post-procedure vital signs reviewed and stable  Report to RN Given: handoff report given  Patient transferred to: Phase II    Handoff Report: Identifed the Patient, Identified the Reponsible Provider, Reviewed the pertinent medical history, Discussed the surgical course, Reviewed Intra-OP anesthesia mangement and issues during anesthesia, Set expectations for post-procedure period and Allowed opportunity for questions and acknowledgement of understanding      Vitals:  Vitals Value Taken Time   /87 10/19/21 1545   Temp 97.8  F (36.6  C) 10/19/21 1535   Pulse 85 10/19/21 1545   Resp 16 10/19/21 1535   SpO2 91 % 10/19/21 1557   Vitals shown include unvalidated device data.    Electronically Signed By: DEEPA Farrell CRNA  October 19, 2021  3:59 PM

## 2021-10-19 NOTE — OP NOTE
PREOPERATIVE DIAGNOSES:   1.  Invasive lobular carcinoma of breast, stage 1, right (H) [C50.911]   2.  Need for venous access with port for adjuvant chemotherapy.      POSTOPERATIVE DIAGNOSIS:     1.  Same   2.  Need for venous access with port for adjuvant chemotherapy.      PROCEDURE:  Insertion Port-A-Cath via US guidance.      HISTORY:  This 66 year old female with invasive lobular cancer of the right breast ; adjuvant chemotherapy is planned.      DESCRIPTION OF PROCEDURE:  With the patient in the supine position on the operating table, general anesthesia was induced.  The requisite timeout pause was observed during which the patient's correct identity and planned procedure were confirmed by the operating room personnel in attendance. With the use of Ultrasound and with the patient in slight trendelenburg position the right  IJ was accessed and wire was confirmed to be in the SVC by XR. Then again with the use of local anesthetic a 5cm incision was made in the right chest and a subcutaneous pocket was created, then with the use of a tunneling device the catheter was tunneled to the neck incision. Then using seldinger technique the Right internal jugular vein was dilated under fluoroscopy and the split sheath left in place. The catheter was then introduced and pulled back into position right at the level of the right mainstem bronchus. The catheter aspirated easily. The port was then secured to the catheter with the provided locking system and secured in the pocket with 3-0 prolene suture. With the use of a grove needle the port was accessed and aspirated easily. The port was then flushed with heparinized saline.  The skin was then closed in layers with 3-0 vicryl and 4-0 monocryl suture and derma bond was placed over the top.      MD Rosio Naidu actively first assisted during this operation providing necessary retraction and exposure as well as maintaining hemostasis and a clear operative  field. This was helpful in allowing the operation to proceed in a safe and expeditious manner. She was present for the entire duration of the operation.

## 2021-10-20 DIAGNOSIS — F90.0 ATTENTION DEFICIT HYPERACTIVITY DISORDER (ADHD), PREDOMINANTLY INATTENTIVE TYPE: ICD-10-CM

## 2021-10-20 RX ORDER — METHYLPHENIDATE HYDROCHLORIDE 10 MG/1
10 TABLET ORAL 3 TIMES DAILY
Qty: 90 TABLET | Refills: 0 | Status: SHIPPED | OUTPATIENT
Start: 2021-10-20 | End: 2021-11-23

## 2021-10-20 NOTE — TELEPHONE ENCOUNTER
ritalin      Last Written Prescription Date:  9/17/21  Last Fill Quantity: 90,   # refills: 0  Last Office Visit: 7/7/21  Future Office visit:    Next 5 appointments (look out 90 days)    Nov 10, 2021  9:45 AM  (Arrive by 9:30 AM)  Return Visit with Valentina Avila NP  [facility] ([facility] ) [address] [place] [address]  [phone]   Nov 24, 2021  9:45 AM  (Arrive by 9:30 AM)  Return Visit with Valentina Avila NP  [facility] ([facility] ) [address]  [place] [address]  [phone]   Dec 08, 2021 10:30 AM  (Arrive by 10:15 AM)  Return Visit with Valentina Avila NP  [facility] ([facility] ) [address]  [place] [address]  [phone]   Dec 22, 2021  8:15 AM  (Arrive by 8:00 AM)  Return Visit with Valentina Avila NP  [facility] ([facility] ) [address]  [place] [address]  [phone]   Jan 12, 2022  9:45 AM  (Arrive by 9:30 AM)  Return Visit with Valentina Avila NP  [facility] ([facility] ) [address] [place] [address]  [phone]           Routing refill request to provider for review/approval because:  Drug not on the [facility], [facility] or [facility] refill protocol or controlled substance

## 2021-10-25 PROBLEM — T45.1X5A CHEMOTHERAPY-INDUCED NEUTROPENIA (H): Status: ACTIVE | Noted: 2021-10-25

## 2021-10-25 PROBLEM — D70.1 CHEMOTHERAPY-INDUCED NEUTROPENIA (H): Status: ACTIVE | Noted: 2021-10-25

## 2021-10-26 DIAGNOSIS — C50.511 MALIGNANT NEOPLASM OF LOWER-OUTER QUADRANT OF RIGHT BREAST OF FEMALE, ESTROGEN RECEPTOR POSITIVE (H): Primary | ICD-10-CM

## 2021-10-26 DIAGNOSIS — Z17.0 MALIGNANT NEOPLASM OF LOWER-OUTER QUADRANT OF RIGHT BREAST OF FEMALE, ESTROGEN RECEPTOR POSITIVE (H): Primary | ICD-10-CM

## 2021-10-26 RX ORDER — ONDANSETRON 8 MG/1
8 TABLET, FILM COATED ORAL EVERY 8 HOURS PRN
Qty: 60 TABLET | Refills: 3 | Status: SHIPPED | OUTPATIENT
Start: 2021-10-26 | End: 2022-11-23

## 2021-10-27 ENCOUNTER — INFUSION THERAPY VISIT (OUTPATIENT)
Dept: INFUSION THERAPY | Facility: OTHER | Age: 66
End: 2021-10-27
Attending: INTERNAL MEDICINE
Payer: MEDICARE

## 2021-10-27 ENCOUNTER — DOCUMENTATION ONLY (OUTPATIENT)
Dept: ONCOLOGY | Facility: OTHER | Age: 66
End: 2021-10-27

## 2021-10-27 VITALS
OXYGEN SATURATION: 97 % | SYSTOLIC BLOOD PRESSURE: 129 MMHG | DIASTOLIC BLOOD PRESSURE: 79 MMHG | WEIGHT: 114.2 LBS | RESPIRATION RATE: 17 BRPM | BODY MASS INDEX: 20.23 KG/M2 | HEART RATE: 81 BPM | TEMPERATURE: 97.3 F

## 2021-10-27 DIAGNOSIS — T45.1X5A CHEMOTHERAPY-INDUCED NEUTROPENIA (H): Primary | ICD-10-CM

## 2021-10-27 DIAGNOSIS — C50.511 MALIGNANT NEOPLASM OF LOWER-OUTER QUADRANT OF RIGHT BREAST OF FEMALE, ESTROGEN RECEPTOR POSITIVE (H): ICD-10-CM

## 2021-10-27 DIAGNOSIS — Z17.0 MALIGNANT NEOPLASM OF LOWER-OUTER QUADRANT OF RIGHT BREAST OF FEMALE, ESTROGEN RECEPTOR POSITIVE (H): ICD-10-CM

## 2021-10-27 DIAGNOSIS — D70.1 CHEMOTHERAPY-INDUCED NEUTROPENIA (H): Primary | ICD-10-CM

## 2021-10-27 LAB
ALBUMIN SERPL-MCNC: 3.5 G/DL (ref 3.4–5)
ALP SERPL-CCNC: 63 U/L (ref 40–150)
ALT SERPL W P-5'-P-CCNC: 24 U/L (ref 0–50)
ANION GAP SERPL CALCULATED.3IONS-SCNC: 5 MMOL/L (ref 3–14)
AST SERPL W P-5'-P-CCNC: 19 U/L (ref 0–45)
BASOPHILS # BLD AUTO: 0.1 10E3/UL (ref 0–0.2)
BASOPHILS NFR BLD AUTO: 2 %
BILIRUB SERPL-MCNC: 0.6 MG/DL (ref 0.2–1.3)
BUN SERPL-MCNC: 14 MG/DL (ref 7–30)
CALCIUM SERPL-MCNC: 9.1 MG/DL (ref 8.5–10.1)
CHLORIDE BLD-SCNC: 108 MMOL/L (ref 94–109)
CO2 SERPL-SCNC: 25 MMOL/L (ref 20–32)
CREAT SERPL-MCNC: 0.68 MG/DL (ref 0.52–1.04)
EOSINOPHIL # BLD AUTO: 0.1 10E3/UL (ref 0–0.7)
EOSINOPHIL NFR BLD AUTO: 3 %
ERYTHROCYTE [DISTWIDTH] IN BLOOD BY AUTOMATED COUNT: 12.8 % (ref 10–15)
GFR SERPL CREATININE-BSD FRML MDRD: >90 ML/MIN/1.73M2
GLUCOSE BLD-MCNC: 76 MG/DL (ref 70–99)
HCT VFR BLD AUTO: 37.9 % (ref 35–47)
HGB BLD-MCNC: 12.3 G/DL (ref 11.7–15.7)
IMM GRANULOCYTES # BLD: 0 10E3/UL
IMM GRANULOCYTES NFR BLD: 0 %
LYMPHOCYTES # BLD AUTO: 1.5 10E3/UL (ref 0.8–5.3)
LYMPHOCYTES NFR BLD AUTO: 37 %
MCH RBC QN AUTO: 30 PG (ref 26.5–33)
MCHC RBC AUTO-ENTMCNC: 32.5 G/DL (ref 31.5–36.5)
MCV RBC AUTO: 92 FL (ref 78–100)
MONOCYTES # BLD AUTO: 0.5 10E3/UL (ref 0–1.3)
MONOCYTES NFR BLD AUTO: 12 %
NEUTROPHILS # BLD AUTO: 1.8 10E3/UL (ref 1.6–8.3)
NEUTROPHILS NFR BLD AUTO: 46 %
NRBC # BLD AUTO: 0 10E3/UL
NRBC BLD AUTO-RTO: 0 /100
PLATELET # BLD AUTO: 260 10E3/UL (ref 150–450)
POTASSIUM BLD-SCNC: 4 MMOL/L (ref 3.4–5.3)
PROT SERPL-MCNC: 7 G/DL (ref 6.8–8.8)
RBC # BLD AUTO: 4.1 10E6/UL (ref 3.8–5.2)
SODIUM SERPL-SCNC: 138 MMOL/L (ref 133–144)
WBC # BLD AUTO: 4 10E3/UL (ref 4–11)

## 2021-10-27 PROCEDURE — 96413 CHEMO IV INFUSION 1 HR: CPT

## 2021-10-27 PROCEDURE — 258N000003 HC RX IP 258 OP 636: Performed by: INTERNAL MEDICINE

## 2021-10-27 PROCEDURE — 96411 CHEMO IV PUSH ADDL DRUG: CPT

## 2021-10-27 PROCEDURE — 250N000011 HC RX IP 250 OP 636: Performed by: INTERNAL MEDICINE

## 2021-10-27 PROCEDURE — 96375 TX/PRO/DX INJ NEW DRUG ADDON: CPT

## 2021-10-27 PROCEDURE — 82040 ASSAY OF SERUM ALBUMIN: CPT | Mod: ZL | Performed by: INTERNAL MEDICINE

## 2021-10-27 PROCEDURE — 96374 THER/PROPH/DIAG INJ IV PUSH: CPT

## 2021-10-27 PROCEDURE — 96409 CHEMO IV PUSH SNGL DRUG: CPT

## 2021-10-27 PROCEDURE — 96372 THER/PROPH/DIAG INJ SC/IM: CPT | Performed by: INTERNAL MEDICINE

## 2021-10-27 PROCEDURE — 96377 APPLICATON ON-BODY INJECTOR: CPT

## 2021-10-27 PROCEDURE — 96367 TX/PROPH/DG ADDL SEQ IV INF: CPT

## 2021-10-27 PROCEDURE — 96365 THER/PROPH/DIAG IV INF INIT: CPT

## 2021-10-27 PROCEDURE — 85025 COMPLETE CBC W/AUTO DIFF WBC: CPT | Mod: ZL | Performed by: INTERNAL MEDICINE

## 2021-10-27 RX ORDER — PALONOSETRON 0.05 MG/ML
0.25 INJECTION, SOLUTION INTRAVENOUS ONCE
Status: COMPLETED | OUTPATIENT
Start: 2021-10-27 | End: 2021-10-27

## 2021-10-27 RX ORDER — HEPARIN SODIUM (PORCINE) LOCK FLUSH IV SOLN 100 UNIT/ML 100 UNIT/ML
5 SOLUTION INTRAVENOUS
Status: DISCONTINUED | OUTPATIENT
Start: 2021-10-27 | End: 2021-10-27 | Stop reason: HOSPADM

## 2021-10-27 RX ORDER — DOXORUBICIN HYDROCHLORIDE 2 MG/ML
60 INJECTION, SOLUTION INTRAVENOUS ONCE
Status: COMPLETED | OUTPATIENT
Start: 2021-10-27 | End: 2021-10-27

## 2021-10-27 RX ADMIN — HEPARIN 5 ML: 100 SYRINGE at 16:09

## 2021-10-27 RX ADMIN — DOXORUBICIN HYDROCHLORIDE 90 MG: 2 INJECTION, SOLUTION INTRAVENOUS at 15:05

## 2021-10-27 RX ADMIN — SODIUM CHLORIDE 250 ML: 9 INJECTION, SOLUTION INTRAVENOUS at 14:35

## 2021-10-27 RX ADMIN — CYCLOPHOSPHAMIDE 900 MG: 1 INJECTION, POWDER, FOR SOLUTION INTRAVENOUS; ORAL at 15:29

## 2021-10-27 RX ADMIN — PALONOSETRON 0.25 MG: 0.05 INJECTION, SOLUTION INTRAVENOUS at 14:36

## 2021-10-27 RX ADMIN — PEGFILGRASTIM 6 MG: KIT SUBCUTANEOUS at 15:42

## 2021-10-27 RX ADMIN — FOSAPREPITANT: 150 INJECTION, POWDER, LYOPHILIZED, FOR SOLUTION INTRAVENOUS at 14:35

## 2021-10-27 ASSESSMENT — PAIN SCALES - GENERAL: PAINLEVEL: NO PAIN (0)

## 2021-10-27 NOTE — PROGRESS NOTES
Your On-body Neulasta injector was applied today at 1348  The injection will start 27 hours after application, at 1650 tomorrow.  Note: the medication will be delivered over 45 minutes.  Before removing the injector, check to see that the light is either solid green or off and that the indicator line is on empty.  If there is a red light on the injector at any time or wetness on the dressing or under the injector after removal, you must report this information.   Call [phone] during business hours to report problems.  Refer to the written information given to you for additional questions

## 2021-10-27 NOTE — PROGRESS NOTES
"Chemotherapy Education    Patient is a 66 year old female  here today for chemotherapy education, accompanied by self.  Pt has a cancer diagnosis of Breast Cancer and their main concern is quality of life.  Their Oncologist is Aquilino, and PCP is Kim Wheat  Reviewed the following with the patient and their support person:  General chemotherapy information, including ways it is excreted from the body and cleaning and containment of vomitus or other bodily fluid, use of the bathroom, sexual health and intimacy, what to do if needing to miss a treatment, when to call a provider and the need for staff to wear protective equipment.  Importance of Central line care (port) or IV site care.  Treatment regimen; Adriamycin and Cytoxan and rationale for strict adherence, specific medication names including pre-treatment medications and at home scheduled or as needed medications, delivery methods, and side effects and management; including skin changes/hand-foot syndrome, anemia, neutropenia, thrombocytopenia, diarrhea/constipation, hair loss syndrome, memory changes/ \"chemobrain\", mouth sores, taste changes, neuropathy, fatigue, myelosuppression, and risk of extravasation or infiltration.  Infection prevention, and monitoring of lab values, what lab tests and what changes of these values meant, along with the possibility of hydration or blood product transfusion, or the need to defer or hold treatment.    Goal of treatment: curative   General orientation to the Medical Oncology department, Infusion Services department, Huc/scheduling, bathrooms and usual flow of the treatment day provided as well as introduction to the Infusion nurses.  Patient received written and verbal information on specific drugs and how to care for self during chemotherapy  Bottle Pump infuser: NA  Patient education given regarding how to use, care for, and monitor the infuser pump.  Patient also instructed to check to ensure balloon is " deflating throughout the day.   Patient's Height was measured today and is  cm  Other concerns: Quality of life  Patient was provided with opportunity to ask any further questions, verbalizes the provider has explained both benefits and risks of treatment, and is agreement with this plan.  Pt instructed to call with further questions or concerns.

## 2021-10-27 NOTE — PROGRESS NOTES
Patient is 66  years old, here accompanied by self  today for infusion of adriamyacin under the orders of Dr. Rolle.  Power port accessed with 19 gauge 3/4 inch non-coring needle.  Line flushed with 10 cc's normal saline.  Needle secured with sterile transparent dressing.  10 cc's blood discarded, and blood taken for 2 tubes of ordered labs.  Patient tolerated well.  Denies pain and discomfort at this time.  Port flushes easily without resistance.    Hand hygiene performed: yes   Mask donned by caregiver: yes Site prepped with CHG: yes Labs drawn: yes Dressing applied using aseptic technique: yes     Patient  lab values: WNL  Patient meets parameters for today's infusion.  Denies questions or concerns regarding today's infusion and/or medications being administered.      Independent dose check completed with Rickie HUANG RN.    Patient identified with two identifiers, order verified, and verbal consent for today's infusion obtained from patient.     1505  IV push verified with Adriamycin  dose, drug, and rate of administration. Infusion administered per protocol. Patient tolerated infusion well, no signs or symptoms of adverse reaction noted. Patient denies pain nor discomfort.     1529 IV pump verified with Cytoxan dose, drug, and rate of administration. Infusion administered per protocol. Patient tolerated infusion well, no signs or symptoms of adverse reaction noted. Patient denies pain nor discomfort.     Needle removed, tip intact. Site clean, dry and intact. Covered with a sterile bandage, slight pressure applied for 30 seconds. Pt instructed to leave bandage intact for a minimum of one hour, and to call with questions or concerns. Copy of appointments, discharge instructions, and after visit summary (AVS) provided to patient. Patient states understanding, discharged.

## 2021-10-27 NOTE — PROGRESS NOTES
[facility] Chemotherapy Consult    The inpatient pharmacist has been consulted to review the patient's chart for  the following: any significant drug interactions between home medications, new take home medications, pre-medications, PRN medications, chemotherapy agents, and chemotherapy regimen.     Current Outpatient Medications   Medication Sig     clindamycin-benzoyl peroxide (BENZACLIN) 1-5 % external gel      dapsone 5 % topical gel Apply 1 g topically      dexamethasone (DECADRON) 4 MG tablet Take 2 tablets (8 mg) by mouth daily for 3 days Start on Day 2 of Cycles 1 through 4.     dexamethasone (DECADRON) 4 MG tablet TAKE 2 TABLETS BY MOUTH ON DAY 2 AND 2 TABLETS BY MOUTH TWICE DAILY ON DAYS 3 AND 4 OF CHEMOTHERAPY     ELEMENTAL MAGNESIUM OR Take 1 tablet by mouth     Lysine 1000 MG TABS      methylphenidate (RITALIN) 10 MG tablet Take 1 tablet (10 mg) by mouth 3 times daily     mupirocin (BACTROBAN) 2 % external ointment Apply topically 3 times daily     NONFORMULARY Take 1 tablet by mouth Vitamin D-Vitamin K (VITAMIN K2-VITAMIN D3 OR)     ondansetron (ZOFRAN) 8 MG tablet Take 1 tablet (8 mg) by mouth every 8 hours as needed for nausea     prochlorperazine (COMPAZINE) 10 MG tablet Take 1 tablet (10 mg) by mouth every 6 hours as needed (Nausea/Vomiting)     prochlorperazine (COMPAZINE) 10 MG tablet TAKE 1 TABLET BY MOUTH EVERY 6 HOURS AS NEEDED FOR NAUSEA     pseudoePHEDrine (SUDAFED) 30 MG tablet Take 2 tablets by mouth every 4 hours as needed      Turmeric Curcumin 500 MG CAPS Take 1,000 mg by mouth      valACYclovir (VALTREX) 1000 mg tablet Take 2 tablets (2,000 mg) by mouth 2 times daily Once for break out     vitamin B complex with vitamin C (VITAMIN  B COMPLEX) tablet Take 1 tablet by mouth      Zinc 50 MG CAPS Take 50 mg by mouth     No current facility-administered medications for this visit.     Take home medications:   1. Dexamethasone  2.  Prochlorperazine    Pre-Treats:   1. Palonosetron (Aloxi)  2. Fosaprepitant (Emend), dexamethasone (Decadron) 12 mg     PRN medications:   1. Lorazepam (Ativan)  2. Famotidine (Pepcid)    Chemotherapy agents:   1. Doxorubicin (Adriamycin)  2. Cyclophosphamide (Cytoxan)     Cancer Being Treated: Breast Cancer    Difference in Regimen Ordered vs. Standard Regimen: none    The following interactions were found and will require patient education:     Drug-Drug interactions:   1. Cyclophosphamide & Doxorubicin: may result in increased risk of cardiomyopathy  2. Ondansetron & Famotidine; Ondansetron & Prochlorperazine: may result in increased risk of QT prolongation  3. Ondansetron & Palonosetron: may result in increased risk of serotonin syndrome     Drug-Allergy interactions: none     Drug-Food interactions:   1. Doxorubicin & Grapefruit Juice: may result in increased Doxorubicin exposure    Drug-Ethanol interactions:   1. Methylphenidate & Ethanol: may result in additive CNS effects       If there are any additional questions or concerns, please contact the inpatient pharmacy ([phone]) for further review.     Cornelia Del Rosario, [facility]  October 27, 2021

## 2021-10-28 DIAGNOSIS — T45.1X5A CHEMOTHERAPY-INDUCED NAUSEA: Primary | ICD-10-CM

## 2021-10-28 DIAGNOSIS — R11.0 CHEMOTHERAPY-INDUCED NAUSEA: Primary | ICD-10-CM

## 2021-10-28 RX ORDER — OLANZAPINE 10 MG/1
10 TABLET ORAL
Qty: 15 TABLET | Refills: 1 | Status: SHIPPED | OUTPATIENT
Start: 2021-10-28 | End: 2022-03-18

## 2021-11-09 RX ORDER — PALONOSETRON 0.05 MG/ML
0.25 INJECTION, SOLUTION INTRAVENOUS ONCE
Status: CANCELLED
Start: 2021-11-10

## 2021-11-10 ENCOUNTER — INFUSION THERAPY VISIT (OUTPATIENT)
Dept: INFUSION THERAPY | Facility: OTHER | Age: 66
End: 2021-11-10
Attending: INTERNAL MEDICINE
Payer: MEDICARE

## 2021-11-10 ENCOUNTER — ONCOLOGY VISIT (OUTPATIENT)
Dept: ONCOLOGY | Facility: OTHER | Age: 66
End: 2021-11-10
Attending: NURSE PRACTITIONER
Payer: MEDICARE

## 2021-11-10 VITALS — SYSTOLIC BLOOD PRESSURE: 133 MMHG | HEART RATE: 86 BPM | DIASTOLIC BLOOD PRESSURE: 77 MMHG

## 2021-11-10 VITALS
OXYGEN SATURATION: 99 % | TEMPERATURE: 98 F | HEART RATE: 96 BPM | BODY MASS INDEX: 20.31 KG/M2 | DIASTOLIC BLOOD PRESSURE: 75 MMHG | HEIGHT: 63 IN | WEIGHT: 114.64 LBS | RESPIRATION RATE: 20 BRPM | SYSTOLIC BLOOD PRESSURE: 143 MMHG

## 2021-11-10 DIAGNOSIS — D64.9 ANEMIA, UNSPECIFIED TYPE: Primary | ICD-10-CM

## 2021-11-10 DIAGNOSIS — D70.1 CHEMOTHERAPY-INDUCED NEUTROPENIA (H): ICD-10-CM

## 2021-11-10 DIAGNOSIS — C50.511 MALIGNANT NEOPLASM OF LOWER-OUTER QUADRANT OF RIGHT BREAST OF FEMALE, ESTROGEN RECEPTOR POSITIVE (H): ICD-10-CM

## 2021-11-10 DIAGNOSIS — Z17.0 MALIGNANT NEOPLASM OF LOWER-OUTER QUADRANT OF RIGHT BREAST OF FEMALE, ESTROGEN RECEPTOR POSITIVE (H): ICD-10-CM

## 2021-11-10 DIAGNOSIS — D70.1 CHEMOTHERAPY-INDUCED NEUTROPENIA (H): Primary | ICD-10-CM

## 2021-11-10 DIAGNOSIS — T45.1X5A CHEMOTHERAPY-INDUCED NEUTROPENIA (H): ICD-10-CM

## 2021-11-10 DIAGNOSIS — T45.1X5A CHEMOTHERAPY-INDUCED NEUTROPENIA (H): Primary | ICD-10-CM

## 2021-11-10 DIAGNOSIS — D64.9 ANEMIA, UNSPECIFIED TYPE: ICD-10-CM

## 2021-11-10 LAB
ALBUMIN SERPL-MCNC: 3.2 G/DL (ref 3.4–5)
ALP SERPL-CCNC: 86 U/L (ref 40–150)
ALT SERPL W P-5'-P-CCNC: 30 U/L (ref 0–50)
ANION GAP SERPL CALCULATED.3IONS-SCNC: 5 MMOL/L (ref 3–14)
AST SERPL W P-5'-P-CCNC: 19 U/L (ref 0–45)
BASOPHILS # BLD MANUAL: 0.1 10E3/UL (ref 0–0.2)
BASOPHILS NFR BLD MANUAL: 1 %
BILIRUB SERPL-MCNC: 0.2 MG/DL (ref 0.2–1.3)
BUN SERPL-MCNC: 18 MG/DL (ref 7–30)
CALCIUM SERPL-MCNC: 8.6 MG/DL (ref 8.5–10.1)
CHLORIDE BLD-SCNC: 110 MMOL/L (ref 94–109)
CO2 SERPL-SCNC: 25 MMOL/L (ref 20–32)
CREAT SERPL-MCNC: 0.73 MG/DL (ref 0.52–1.04)
EOSINOPHIL # BLD MANUAL: 0 10E3/UL (ref 0–0.7)
EOSINOPHIL NFR BLD MANUAL: 0 %
ERYTHROCYTE [DISTWIDTH] IN BLOOD BY AUTOMATED COUNT: 13.1 % (ref 10–15)
FERRITIN SERPL-MCNC: 311 NG/ML (ref 8–252)
GFR SERPL CREATININE-BSD FRML MDRD: 87 ML/MIN/1.73M2
GLUCOSE BLD-MCNC: 82 MG/DL (ref 70–99)
HCT VFR BLD AUTO: 34.2 % (ref 35–47)
HGB BLD-MCNC: 11.1 G/DL (ref 11.7–15.7)
IRON SATN MFR SERPL: 27 % (ref 15–46)
IRON SERPL-MCNC: 77 UG/DL (ref 35–180)
LYMPHOCYTES # BLD MANUAL: 2.2 10E3/UL (ref 0.8–5.3)
LYMPHOCYTES NFR BLD MANUAL: 18 %
MCH RBC QN AUTO: 29.8 PG (ref 26.5–33)
MCHC RBC AUTO-ENTMCNC: 32.5 G/DL (ref 31.5–36.5)
MCV RBC AUTO: 92 FL (ref 78–100)
METAMYELOCYTES # BLD MANUAL: 0.7 10E3/UL
METAMYELOCYTES NFR BLD MANUAL: 6 %
MONOCYTES # BLD MANUAL: 0.5 10E3/UL (ref 0–1.3)
MONOCYTES NFR BLD MANUAL: 4 %
NEUTROPHILS # BLD MANUAL: 8.8 10E3/UL (ref 1.6–8.3)
NEUTROPHILS NFR BLD MANUAL: 71 %
PLAT MORPH BLD: ABNORMAL
PLATELET # BLD AUTO: 194 10E3/UL (ref 150–450)
POTASSIUM BLD-SCNC: 3.9 MMOL/L (ref 3.4–5.3)
PROT SERPL-MCNC: 6.7 G/DL (ref 6.8–8.8)
RBC # BLD AUTO: 3.73 10E6/UL (ref 3.8–5.2)
RBC MORPH BLD: ABNORMAL
SODIUM SERPL-SCNC: 140 MMOL/L (ref 133–144)
TIBC SERPL-MCNC: 284 UG/DL (ref 240–430)
WBC # BLD AUTO: 12.4 10E3/UL (ref 4–11)

## 2021-11-10 PROCEDURE — 36591 DRAW BLOOD OFF VENOUS DEVICE: CPT | Mod: ZL

## 2021-11-10 PROCEDURE — G0463 HOSPITAL OUTPT CLINIC VISIT: HCPCS | Mod: 25

## 2021-11-10 PROCEDURE — 258N000003 HC RX IP 258 OP 636: Performed by: NURSE PRACTITIONER

## 2021-11-10 PROCEDURE — 96523 IRRIG DRUG DELIVERY DEVICE: CPT

## 2021-11-10 PROCEDURE — 99215 OFFICE O/P EST HI 40 MIN: CPT | Performed by: NURSE PRACTITIONER

## 2021-11-10 PROCEDURE — 96413 CHEMO IV INFUSION 1 HR: CPT

## 2021-11-10 PROCEDURE — 85014 HEMATOCRIT: CPT | Mod: ZL | Performed by: NURSE PRACTITIONER

## 2021-11-10 PROCEDURE — 96367 TX/PROPH/DG ADDL SEQ IV INF: CPT

## 2021-11-10 PROCEDURE — 96374 THER/PROPH/DIAG INJ IV PUSH: CPT

## 2021-11-10 PROCEDURE — 96411 CHEMO IV PUSH ADDL DRUG: CPT

## 2021-11-10 PROCEDURE — 96372 THER/PROPH/DIAG INJ SC/IM: CPT | Performed by: NURSE PRACTITIONER

## 2021-11-10 PROCEDURE — G0463 HOSPITAL OUTPT CLINIC VISIT: HCPCS

## 2021-11-10 PROCEDURE — 82247 BILIRUBIN TOTAL: CPT | Mod: ZL

## 2021-11-10 PROCEDURE — 83550 IRON BINDING TEST: CPT | Mod: ZL

## 2021-11-10 PROCEDURE — 96377 APPLICATON ON-BODY INJECTOR: CPT

## 2021-11-10 PROCEDURE — 82728 ASSAY OF FERRITIN: CPT | Mod: ZL

## 2021-11-10 PROCEDURE — 96409 CHEMO IV PUSH SNGL DRUG: CPT

## 2021-11-10 PROCEDURE — 99417 PROLNG OP E/M EACH 15 MIN: CPT | Performed by: NURSE PRACTITIONER

## 2021-11-10 PROCEDURE — 250N000011 HC RX IP 250 OP 636: Mod: JW | Performed by: NURSE PRACTITIONER

## 2021-11-10 PROCEDURE — 82040 ASSAY OF SERUM ALBUMIN: CPT | Mod: ZL

## 2021-11-10 PROCEDURE — 96375 TX/PRO/DX INJ NEW DRUG ADDON: CPT

## 2021-11-10 RX ORDER — HEPARIN SODIUM,PORCINE 10 UNIT/ML
5 VIAL (ML) INTRAVENOUS
Status: CANCELLED | OUTPATIENT
Start: 2021-11-10

## 2021-11-10 RX ORDER — NALOXONE HYDROCHLORIDE 0.4 MG/ML
0.2 INJECTION, SOLUTION INTRAMUSCULAR; INTRAVENOUS; SUBCUTANEOUS
Status: CANCELLED | OUTPATIENT
Start: 2021-11-10

## 2021-11-10 RX ORDER — EPINEPHRINE 1 MG/ML
0.3 INJECTION, SOLUTION, CONCENTRATE INTRAVENOUS EVERY 5 MIN PRN
Status: CANCELLED | OUTPATIENT
Start: 2021-11-10

## 2021-11-10 RX ORDER — DOXORUBICIN HYDROCHLORIDE 2 MG/ML
60 INJECTION, SOLUTION INTRAVENOUS ONCE
Status: COMPLETED | OUTPATIENT
Start: 2021-11-10 | End: 2021-11-10

## 2021-11-10 RX ORDER — ALBUTEROL SULFATE 0.83 MG/ML
2.5 SOLUTION RESPIRATORY (INHALATION)
Status: CANCELLED | OUTPATIENT
Start: 2021-11-10

## 2021-11-10 RX ORDER — DOXORUBICIN HYDROCHLORIDE 2 MG/ML
60 INJECTION, SOLUTION INTRAVENOUS ONCE
Status: CANCELLED | OUTPATIENT
Start: 2021-11-10

## 2021-11-10 RX ORDER — MEPERIDINE HYDROCHLORIDE 25 MG/ML
25 INJECTION INTRAMUSCULAR; INTRAVENOUS; SUBCUTANEOUS EVERY 30 MIN PRN
Status: CANCELLED | OUTPATIENT
Start: 2021-11-10

## 2021-11-10 RX ORDER — METHYLPREDNISOLONE SODIUM SUCCINATE 125 MG/2ML
125 INJECTION, POWDER, LYOPHILIZED, FOR SOLUTION INTRAMUSCULAR; INTRAVENOUS
Status: CANCELLED
Start: 2021-11-10

## 2021-11-10 RX ORDER — HEPARIN SODIUM (PORCINE) LOCK FLUSH IV SOLN 100 UNIT/ML 100 UNIT/ML
5 SOLUTION INTRAVENOUS
Status: CANCELLED | OUTPATIENT
Start: 2021-11-10

## 2021-11-10 RX ORDER — PALONOSETRON 0.05 MG/ML
0.25 INJECTION, SOLUTION INTRAVENOUS ONCE
Status: COMPLETED | OUTPATIENT
Start: 2021-11-10 | End: 2021-11-10

## 2021-11-10 RX ORDER — ALBUTEROL SULFATE 90 UG/1
1-2 AEROSOL, METERED RESPIRATORY (INHALATION)
Status: CANCELLED
Start: 2021-11-10

## 2021-11-10 RX ORDER — DIPHENHYDRAMINE HYDROCHLORIDE 50 MG/ML
50 INJECTION INTRAMUSCULAR; INTRAVENOUS
Status: CANCELLED
Start: 2021-11-10

## 2021-11-10 RX ORDER — LORAZEPAM 2 MG/ML
0.5 INJECTION INTRAMUSCULAR EVERY 4 HOURS PRN
Status: CANCELLED | OUTPATIENT
Start: 2021-11-10

## 2021-11-10 RX ORDER — HEPARIN SODIUM (PORCINE) LOCK FLUSH IV SOLN 100 UNIT/ML 100 UNIT/ML
5 SOLUTION INTRAVENOUS
Status: DISCONTINUED | OUTPATIENT
Start: 2021-11-10 | End: 2021-11-10 | Stop reason: HOSPADM

## 2021-11-10 RX ADMIN — CYCLOPHOSPHAMIDE 900 MG: 1 INJECTION, POWDER, FOR SOLUTION INTRAVENOUS; ORAL at 12:09

## 2021-11-10 RX ADMIN — SODIUM CHLORIDE 250 ML: 9 INJECTION, SOLUTION INTRAVENOUS at 11:15

## 2021-11-10 RX ADMIN — DOXORUBICIN HYDROCHLORIDE 90 MG: 2 INJECTION, SOLUTION INTRAVENOUS at 11:52

## 2021-11-10 RX ADMIN — PEGFILGRASTIM 6 MG: KIT SUBCUTANEOUS at 12:49

## 2021-11-10 RX ADMIN — PALONOSETRON 0.25 MG: 0.05 INJECTION, SOLUTION INTRAVENOUS at 11:21

## 2021-11-10 RX ADMIN — FOSAPREPITANT: 150 INJECTION, POWDER, LYOPHILIZED, FOR SOLUTION INTRAVENOUS at 11:22

## 2021-11-10 RX ADMIN — HEPARIN 5 ML: 100 SYRINGE at 12:46

## 2021-11-10 ASSESSMENT — MIFFLIN-ST. JEOR: SCORE: 1034.13

## 2021-11-10 ASSESSMENT — PAIN SCALES - GENERAL: PAINLEVEL: NO PAIN (0)

## 2021-11-10 NOTE — PROGRESS NOTES
Oncology Follow-up Visit:  November 10, 2021    Reason for Visit:  Patient presents with:  Oncology Clinic Visit: Follow up Malignant neoplasm of lower-outer quadrant of right breast of female, estrogen receptor positive     Nursing Note and documentation reviewed: yes    HPI: This is a 66-year-old female patient who presents to the oncology clinic today for evaluation prior to receiving cycle 2 adjuvant chemotherapy for stage IIb invasive lobular carcinoma of the right breast diagnosed 4/2021.      She presents to the clinic today accompanied by her .  She did fairly well with the first cycle and states she has had some issues with slight nausea describing it more as a gnawing in her stomach.  She has used the Zofran if needed.  She is having issues with constipation since the chemotherapy and is currently using senna S and plans to use a type of alternative magnesium supplement recommended by her functional medicine health .  She does also follow with an integrative medicine physician at [facility] in [place].  Note she is currently fasting, stating she fasts for 16 hours prior to chemotherapy and 20 hours after per recommendation of her health .  She states she does feel she is drinking enough fluids.  She has lost weight since changing her diet.    Oncologic History:     3/30/2021 patient palpated a mass in the right breast  4/9/2021 she underwent mammogram showing an abnormality in the lower outer aspect of the right breast  4/13/2021  She underwent ultrasound-guided biopsy with pathology showing invasive lobular carcinoma, grade 2, ER positive, CT negative, HER-2/kevin negative  7/9/2021  She underwent bilateral breast MRI showing Corresponding to the ultrasound finding in the right lower breast at about the 7 o'clock position an area of significant increased enhancement and mass extends for about 2.4 x 1.0 x 1.9 cm. This is larger than the ultrasound measurements.   8/23/2021 she underwent  lumpectomy with sentinel nodes by Dr. Sofy Cobb with [facility] and pathology showed a 3 cm, grade 2 invasive lobular carcinoma with inferior lateral margin positive for invasive carcinoma; 3 sentinel nodes obtained, 1 with micrometastasis measuring 2 mm and 1 with macrometastasis and she was staged [id number]  9/15/2021 she was seen by Dr. Nunez Friday with Medical Oncology and felt the patient had an early stage breast cancer, stage II with positive margins and he recommended completion mastectomy and felt she would not likely benefit from additional axillary lymph node surgery; Oncotype DX resulted at 53 and he recommended 4 cycles of dose dense AC followed by 12 weeks of Taxol  9/27/2021  Echocardiogram showed an EF of 65%  10/11/2021  Patient was seen by Dr. Rolle with Medical Oncology at [facility] for a second opinion and he recommended a PET scan and MRI of the brain  10/11/2021  MRI of the brain was negative   10/13/2021  PET scan showed postop changes consistent with history of right breast lumpectomy and axillary node dissection and no evidence of metastatic disease  10/18/2021  She was seen again by Dr. Rolle and Staged IIB with recommendation was for AC x4 cycles followed by weekly Taxol x12  10/19/2021  Port-A-Cath placement by Dr. Chen  Echocardiogram  10/27/2021 cycle 1 Adriamycin/Cyclophosphamide    Genetics:  Referred/not completed    Current Chemo Regime/TX: Doxorubicin 60 mg per metered squared and cyclophosphamide 600 mg per metered squared given every 14 days with pegfilgrastim 6 mg subcutaneous day 2  Current Cycle:  2  # of completed cycles:  1    Previous treatment:  n/a    Past Medical History:   Diagnosis Date     Acne vulgaris      Cancer ([facility])      Chemotherapy-induced neutropenia ([facility]) 10/25/2021     Complication of anesthesia     nauseated     Premenstrual tension syndromes 10/14/2004       Past Surgical History:   Procedure Laterality Date     C OBSTETRICAL D&C        INSERT PORT VASCULAR ACCESS N/A 10/19/2021    Procedure: port-a-cath placement;  Surgeon: Reginaldo Chen MD;  Location: [facility] OR     LUMPECTOMY BREAST Right 08/23/2021       Family History   Problem Relation Age of Onset     Allergies Mother      Breast Cancer Mother      Diabetes Mother      Cardiovascular Paternal Grandfather      Allergies Paternal Grandfather      Angina Paternal Grandfather      Coronary Artery Disease Paternal Grandfather      Other - See Comments Father      Depression Maternal Grandmother      Cerebrovascular Disease Maternal Grandmother      Thyroid Disease Maternal Grandmother      Colon Cancer Maternal Grandfather      Diabetes Maternal Grandfather      Rheumatoid Arthritis Paternal Grandmother      Breast Cancer Cousin      Breast Cancer Maternal Aunt      Cancer Maternal Aunt         esphogeal, lung     Hypertension No family hx of      Hyperlipidemia No family hx of      Prostate Cancer No family hx of      Anesthesia Reaction No family hx of      Asthma No family hx of      Genetic Disorder No family hx of        Social History     Socioeconomic History     Marital status:      Spouse name: Not on file     Number of children: Not on file     Years of education: Not on file     Highest education level: Not on file   Occupational History     Not on file   Tobacco Use     Smoking status: Never Smoker     Smokeless tobacco: Never Used   Substance and Sexual Activity     Alcohol use: No     Drug use: No     Sexual activity: Not on file   Other Topics Concern     Parent/sibling w/ CABG, MI or angioplasty before 65F 55M? Yes   Social History Narrative     Not on file     Social Determinants of Health     Financial Resource Strain: Not on file   Food Insecurity: Not on file   Transportation Needs: Not on file   Physical Activity: Not on file   Stress: Not on file   Social Connections: Not on file   Intimate Partner Violence: Not on file   Housing Stability: Not on file       Current  Outpatient Medications   Medication     dexamethasone (DECADRON) 4 MG tablet     ELEMENTAL MAGNESIUM OR     Lysine 1000 MG TABS     methylphenidate (RITALIN) 10 MG tablet     NONFORMULARY     ondansetron (ZOFRAN) 8 MG tablet     Turmeric Curcumin 500 MG CAPS     vitamin B complex with vitamin C (VITAMIN  B COMPLEX) tablet     Zinc 50 MG CAPS     clindamycin-benzoyl peroxide (BENZACLIN) 1-5 % external gel     dapsone 5 % topical gel     mupirocin (BACTROBAN) 2 % external ointment     OLANZapine (ZYPREXA) 10 MG tablet     prochlorperazine (COMPAZINE) 10 MG tablet     pseudoePHEDrine (SUDAFED) 30 MG tablet     valACYclovir (VALTREX) 1000 mg tablet     No current facility-administered medications for this visit.        Allergies   Allergen Reactions     Vancomycin Other (See Comments)     Legs went numb- side effect     Amoxicillin Unknown     Headache  Stomach aches  Augmentin     Amoxicillin Trihydrate Other (See Comments)     Headache  Stomach aches  Augmentin         Clavulanic Acid Potassium Other (See Comments)     Headaches  Stomach aches  Augmentin       Levaquin [Levofloxacin] Other (See Comments)     Numbness in leg       Review Of Systems:  Constitutional:    denies fever, weight changes, chills, and night sweats.  Eyes:    denies blurred or double vision  Ears/Nose/Throat:   denies ear pain, nose problems, difficulty swallowing  Respiratory:   denies shortness of breath, cough   Skin:   denies rash, lesions  Breast/Chest wall:   denies pain, lumps or discharge  Cardiovascular:   denies chest pain, palpitations, edema  Gastrointestinal:   denies abdominal pain, bloating, vomiting, early satiety; constipation since starting treatment-  Genitourinary:   denies difficulty with urination, blood in urine  Musculoskeletal:    denies new muscle pain, bone pain  Neurologic:   denies lightheadedness, headaches, numbness or tingling  Psychiatric:   denies anxiety, depression  Hematologic/Lymphatic/Immunologic:   denies  "easy bruising, easy bleeding, lumps or bumps noted  Endocrine:   Denies increased thirst; has had hot flashes for years    Fatigue (0=no fatigue; 10=worst fatigue imaginable): 4    ECOG Performance Status: 1    Physical Exam:  BP (!) 143/75   Pulse 96   Temp 98  F (36.7  C) (Tympanic)   Resp 20   Ht 1.6 m (5' 3\")   Wt 52 kg (114 lb 10.2 oz)   SpO2 99%   BMI 20.31 kg/m      GENERAL APPEARANCE: Healthy, alert and in no acute distress.  HEENT: Normocephalic, Sclerae anicteric. No oral lesions or thrush  NECK:   No asymmetry or masses, no thyromegaly.  LYMPHATICS: No palpable cervical, supraclavicular, axillary, or inguinal nodes   RESP: Lungs clear to auscultation bilaterally, respirations regular and easy  CARDIOVASCULAR: Regular rate and rhythm. Normal S1, S2; no murmur, gallop, or rub.  ABDOMEN: Soft, nontender. Bowel sounds auscultated all 4 quadrants. No palpable organomegaly or masses.  MUSCULOSKELETAL: Extremities without gross deformities noted. No edema of bilateral lower extremities.  NEURO: Alert and oriented x 3.  Gait steady.  PSYCHIATRIC: Mentation and affect appear normal.  Mood appropriate.    Laboratory:  Results for orders placed or performed in visit on 11/10/21   Comprehensive metabolic panel     Status: Abnormal   Result Value Ref Range    Sodium 140 133 - 144 mmol/L    Potassium 3.9 3.4 - 5.3 mmol/L    Chloride 110 (H) 94 - 109 mmol/L    Carbon Dioxide (CO2) 25 20 - 32 mmol/L    Anion Gap 5 3 - 14 mmol/L    Urea Nitrogen 18 7 - 30 mg/dL    Creatinine 0.73 0.52 - 1.04 mg/dL    Calcium 8.6 8.5 - 10.1 mg/dL    Glucose 82 70 - 99 mg/dL    Alkaline Phosphatase 86 40 - 150 U/L    AST 19 0 - 45 U/L    ALT 30 0 - 50 U/L    Protein Total 6.7 (L) 6.8 - 8.8 g/dL    Albumin 3.2 (L) 3.4 - 5.0 g/dL    Bilirubin Total 0.2 0.2 - 1.3 mg/dL    GFR Estimate 87 >60 mL/min/1.73m2   CBC with platelets and differential     Status: Abnormal   Result Value Ref Range    WBC Count 12.4 (H) 4.0 - 11.0 10e3/uL    RBC " Count 3.73 (L) 3.80 - 5.20 10e6/uL    Hemoglobin 11.1 (L) 11.7 - 15.7 g/dL    Hematocrit 34.2 (L) 35.0 - 47.0 %    MCV 92 78 - 100 fL    MCH 29.8 26.5 - 33.0 pg    MCHC 32.5 31.5 - 36.5 g/dL    RDW 13.1 10.0 - 15.0 %    Platelet Count 194 150 - 450 10e3/uL   Manual Differential     Status: Abnormal   Result Value Ref Range    % Neutrophils 71 %    % Lymphocytes 18 %    % Monocytes 4 %    % Eosinophils 0 %    % Basophils 1 %    % Metamyelocytes 6 %    Absolute Neutrophils 8.8 (H) 1.6 - 8.3 10e3/uL    Absolute Lymphocytes 2.2 0.8 - 5.3 10e3/uL    Absolute Monocytes 0.5 0.0 - 1.3 10e3/uL    Absolute Eosinophils 0.0 0.0 - 0.7 10e3/uL    Absolute Basophils 0.1 0.0 - 0.2 10e3/uL    Absolute Metamyelocytes 0.7 (H) <=0.0 10e3/uL    RBC Morphology Confirmed RBC Indices     Platelet Assessment  Automated Count Confirmed. Platelet morphology is normal.     Automated Count Confirmed. Platelet morphology is normal.   CBC with platelets differential     Status: Abnormal    Narrative    The following orders were created for panel order CBC with platelets differential.  Procedure                               Abnormality         Status                     ---------                               -----------         ------                     CBC with platelets and d...[[id number]]  Abnormal            Final result               Manual Differential[[id number]]          Abnormal            Final result                 Please view results for these tests on the individual orders.       Imaging Studies: None completed for today's visit      ASSESSMENT/PLAN:    #1 Breast cancer: Stage IIB invasive lobular carcinoma of the right breast diagnosed 4/2021 and currently undergoing adjuvant therapy with Adriamycin and cyclophosphamide.  She will receive cycle 2 therapy today and follow-up prior to cycle 3 with labs per treatment plan.      #2 nausea: Mild.  I did recommend trying Pepcid OTC as needed or on a daily basis.    #3  Anemia:   Iron/TIBC and ferritin will be obtained today.    #4  Chemotherapy induced neutropenia:  Pegfilgrastim 6mg day 2.    I encouraged patient to call with any questions or concerns.    70 minutes spent in the patient's encounter today with time spent in review of the chart along with in chart preparation.  Time was also spent in reviewing her treatment plan and signing of her treatment plan.  Time was spent in discussion of her current antioxidant use and the unknown possible untoward effects of this following chemotherapy.  Time was also spent obtaining a review of systems and performing a physical exam along with review of her lab work in detail.  Time was also spent in reviewing her next follow-up and in chart documentation.    Valentina Avila NP  APRN, FNP-BC, AOCNP

## 2021-11-10 NOTE — PATIENT INSTRUCTIONS
Rx faxed.  Lucein RAMESH     Thank you for scheduling your appointment with us today. If you have any questions or concerns related to procedure/medication at today's visit, please contact your primary care provider, or the provider who ordered today's procedure/medication. If you have any questions related to scheduling with our unit, or if you are having trouble getting in contact with your ordering provider, we can be reached at [phone].     Your On-body Neulasta injector was applied today at 12:55pm  The injection will start 27 hours after application, at 3:55pm tomorrow.  Note: the medication will be delivered over 45 minutes.  Before removing the injector, check to see that the light is either solid green or off and that the indicator line is on empty.  If there is a red light on the injector at any time or wetness on the dressing or under the injector after removal, you must report this information.   Call [phone] during business hours to report problems.  Refer to the written information given to you for additional questions

## 2021-11-10 NOTE — PATIENT INSTRUCTIONS
We would like to see you back per your schedule.     When you are in need of a refill please call your pharmacy and they will send us a request.     If you have any questions please call [phone]    Other instructions:  none

## 2021-11-10 NOTE — PROGRESS NOTES
Patients power port accessed using non-coring, 19 gauge, 3/4 inch needle.Mask donned by caregiver: yes Site prepped with CHG: yes Labs drawn: yes Dressing applied using aseptic technique: yes.     Port accessed per facility protocol. Port flushed easily, blood return noted.  No signs and symptoms of infection or infiltration.  Port flushed with 10mL normal saline, blood return noted, 10 mLs blood discarded. 2 tube(s) taken for ordered labs, port flushed with 20 mLs normal saline.  Port left accessed as patient has appointment with Jeana Avila, and is then due for chemo if parameters are met.  Patient discharged with no complaints.

## 2021-11-10 NOTE — PROGRESS NOTES
Patient is a 66 year old here accompanied by self today for infusion of Adriamycin/cytoxan under the orders of Dr. Rolle.     Component      Latest Ref Rng & Units 11/10/2021   Sodium      133 - 144 mmol/L 140   Potassium      3.4 - 5.3 mmol/L 3.9   Chloride      94 - 109 mmol/L 110 (H)   Carbon Dioxide      20 - 32 mmol/L 25   Anion Gap      3 - 14 mmol/L 5   Urea Nitrogen      7 - 30 mg/dL 18   Creatinine      0.52 - 1.04 mg/dL 0.73   Calcium      8.5 - 10.1 mg/dL 8.6   Glucose      70 - 99 mg/dL 82   Alkaline Phosphatase      40 - 150 U/L 86   AST      0 - 45 U/L 19   ALT      0 - 50 U/L 30   Protein Total      6.8 - 8.8 g/dL 6.7 (L)   Albumin      3.4 - 5.0 g/dL 3.2 (L)   Bilirubin Total      0.2 - 1.3 mg/dL 0.2   GFR Estimate      >60 mL/min/1.73m2 87   % Neutrophils      % 71   % Lymphocytes      % 18   % Monocytes      % 4   % Eosinophils      % 0   % Basophils      % 1   % Metamyelocytes      % 6   Absolute Neutrophil      1.6 - 8.3 10e3/uL 8.8 (H)   Absolute Lymphocytes      0.8 - 5.3 10e3/uL 2.2   Absolute Monocytes      0.0 - 1.3 10e3/uL 0.5   Absolute Eosinophils      0.0 - 0.7 10e3/uL 0.0   Absolute Basophils      0.0 - 0.2 10e3/uL 0.1   Absolute Metamyelocytes      <=0.0 10e3/uL 0.7 (H)   RBC Morphology       Confirmed RBC Indices   Platelet Morphology      Automated Count Confirmed. Platelet morphology is normal. Automated Count Confirmed. Platelet morphology is normal.   WBC      4.0 - 11.0 10e3/uL 12.4 (H)   RBC Count      3.80 - 5.20 10e6/uL 3.73 (L)   Hemoglobin      11.7 - 15.7 g/dL 11.1 (L)   Hematocrit      35.0 - 47.0 % 34.2 (L)   MCV      78 - 100 fL 92   MCH      26.5 - 33.0 pg 29.8   MCHC      31.5 - 36.5 g/dL 32.5   RDW      10.0 - 15.0 % 13.1   Platelet Count      150 - 450 10e3/uL 194       Adriamycin/Cytoxan dose(s) verified with kirsten Ariza RN prior to release of drug.    Patient meets parameters for today's infusion.  Denies questions or concerns regarding today's infusion and/or  medications being administered.      Patient identified with two identifiers, order verified, and verbal consent for today's infusion obtained from patient.    1152 IV pump verified with Adriamycin dose, drug, and rate of administration. Infusion administered per protocol. Patient tolerated infusion well, no signs or symptoms of adverse reaction noted. Patient denies pain nor discomfort.     1209 IV push verified with Cytoxan dose, drug, and rate of administration. Infusion administered per protocol. Patient tolerated infusion well, no signs or symptoms of adverse reaction noted. Patient denies pain nor discomfort.     Needle removed, tip intact. Site clean, dry and intact. Covered with a sterile bandage, slight pressure applied for 30 seconds. Pt instructed to leave bandage intact for a minimum of one hour, and to call with questions or concerns. Copy of appointments, discharge instructions, and after visit summary (AVS) provided to patient. Patient states understanding, discharged ambulatory.

## 2021-11-10 NOTE — NURSING NOTE
"Oncology Rooming Note    November 10, 2021 9:31 AM   Anne Marie Nix is a 66 year old female who presents for:    Chief Complaint   Patient presents with     Oncology Clinic Visit     Follow up Malignant neoplasm of lower-outer quadrant of right breast of female, estrogen receptor positive     Initial Vitals: BP (!) 143/75   Pulse 96   Temp 98  F (36.7  C) (Tympanic)   Resp 20   Ht 1.6 m (5' 3\")   Wt 52 kg (114 lb 10.2 oz)   SpO2 99%   BMI 20.31 kg/m   Estimated body mass index is 20.31 kg/m  as calculated from the following:    Height as of this encounter: 1.6 m (5' 3\").    Weight as of this encounter: 52 kg (114 lb 10.2 oz). Body surface area is 1.52 meters squared.  No Pain (0) Comment: Data Unavailable   No LMP recorded. Patient is postmenopausal.  Allergies reviewed: Yes  Medications reviewed: Yes    Medications: Medication refills not needed today.  Pharmacy name entered into [facility]:    [id number] - [place], [place] - [address]  [id number] - [place], [place] - [address] [facility] [address]  [id number] - [place], [place] - [address] [facility] [address]  [facility] - [place], [place] - [address]          Gaby Drew LPN            "

## 2021-11-10 NOTE — PROGRESS NOTES
Your On-body Neulasta injector was applied today at 12:55pm.  The injection will start 27 hours after application, at 3:55pm tomorrow.  Note: the medication will be delivered over 45 minutes.  Before removing the injector, check to see that the light is either solid green or off and that the indicator line is on empty.  If there is a red light on the injector at any time or wetness on the dressing or under the injector after removal, you must report this information.   Call [phone] during business hours to report problems.  Refer to the written information given to you for additional questions    Lot [id number]  Exp 08/22

## 2021-11-23 DIAGNOSIS — D70.1 CHEMOTHERAPY-INDUCED NEUTROPENIA (H): Primary | ICD-10-CM

## 2021-11-23 DIAGNOSIS — T45.1X5A CHEMOTHERAPY-INDUCED NEUTROPENIA (H): Primary | ICD-10-CM

## 2021-11-23 DIAGNOSIS — C50.511 MALIGNANT NEOPLASM OF LOWER-OUTER QUADRANT OF RIGHT BREAST OF FEMALE, ESTROGEN RECEPTOR POSITIVE (H): ICD-10-CM

## 2021-11-23 DIAGNOSIS — Z17.0 MALIGNANT NEOPLASM OF LOWER-OUTER QUADRANT OF RIGHT BREAST OF FEMALE, ESTROGEN RECEPTOR POSITIVE (H): ICD-10-CM

## 2021-11-23 RX ORDER — LORAZEPAM 2 MG/ML
0.5 INJECTION INTRAMUSCULAR EVERY 4 HOURS PRN
Status: CANCELLED | OUTPATIENT
Start: 2021-11-24

## 2021-11-23 RX ORDER — METHYLPREDNISOLONE SODIUM SUCCINATE 125 MG/2ML
125 INJECTION, POWDER, LYOPHILIZED, FOR SOLUTION INTRAMUSCULAR; INTRAVENOUS
Status: CANCELLED
Start: 2021-12-08

## 2021-11-23 RX ORDER — ALBUTEROL SULFATE 90 UG/1
1-2 AEROSOL, METERED RESPIRATORY (INHALATION)
Status: CANCELLED
Start: 2021-12-08

## 2021-11-23 RX ORDER — NALOXONE HYDROCHLORIDE 0.4 MG/ML
0.2 INJECTION, SOLUTION INTRAMUSCULAR; INTRAVENOUS; SUBCUTANEOUS
Status: CANCELLED | OUTPATIENT
Start: 2021-12-08

## 2021-11-23 RX ORDER — LORAZEPAM 2 MG/ML
0.5 INJECTION INTRAMUSCULAR EVERY 4 HOURS PRN
Status: CANCELLED | OUTPATIENT
Start: 2021-12-08

## 2021-11-23 RX ORDER — ALBUTEROL SULFATE 0.83 MG/ML
2.5 SOLUTION RESPIRATORY (INHALATION)
Status: CANCELLED | OUTPATIENT
Start: 2021-11-24

## 2021-11-23 RX ORDER — HEPARIN SODIUM (PORCINE) LOCK FLUSH IV SOLN 100 UNIT/ML 100 UNIT/ML
5 SOLUTION INTRAVENOUS
Status: CANCELLED | OUTPATIENT
Start: 2021-12-08

## 2021-11-23 RX ORDER — DIPHENHYDRAMINE HYDROCHLORIDE 50 MG/ML
50 INJECTION INTRAMUSCULAR; INTRAVENOUS
Status: CANCELLED
Start: 2021-12-08

## 2021-11-23 RX ORDER — PALONOSETRON 0.05 MG/ML
0.25 INJECTION, SOLUTION INTRAVENOUS ONCE
Status: CANCELLED
Start: 2021-12-08

## 2021-11-23 RX ORDER — ALBUTEROL SULFATE 90 UG/1
1-2 AEROSOL, METERED RESPIRATORY (INHALATION)
Status: CANCELLED
Start: 2021-11-24

## 2021-11-23 RX ORDER — EPINEPHRINE 1 MG/ML
0.3 INJECTION, SOLUTION, CONCENTRATE INTRAVENOUS EVERY 5 MIN PRN
Status: CANCELLED | OUTPATIENT
Start: 2021-11-24

## 2021-11-23 RX ORDER — ALBUTEROL SULFATE 0.83 MG/ML
2.5 SOLUTION RESPIRATORY (INHALATION)
Status: CANCELLED | OUTPATIENT
Start: 2021-12-08

## 2021-11-23 RX ORDER — EPINEPHRINE 1 MG/ML
0.3 INJECTION, SOLUTION, CONCENTRATE INTRAVENOUS EVERY 5 MIN PRN
Status: CANCELLED | OUTPATIENT
Start: 2021-12-08

## 2021-11-23 RX ORDER — DIPHENHYDRAMINE HYDROCHLORIDE 50 MG/ML
50 INJECTION INTRAMUSCULAR; INTRAVENOUS
Status: CANCELLED
Start: 2021-11-24

## 2021-11-23 RX ORDER — HEPARIN SODIUM (PORCINE) LOCK FLUSH IV SOLN 100 UNIT/ML 100 UNIT/ML
5 SOLUTION INTRAVENOUS
Status: CANCELLED | OUTPATIENT
Start: 2021-11-24

## 2021-11-23 RX ORDER — NALOXONE HYDROCHLORIDE 0.4 MG/ML
0.2 INJECTION, SOLUTION INTRAMUSCULAR; INTRAVENOUS; SUBCUTANEOUS
Status: CANCELLED | OUTPATIENT
Start: 2021-11-24

## 2021-11-23 RX ORDER — METHYLPREDNISOLONE SODIUM SUCCINATE 125 MG/2ML
125 INJECTION, POWDER, LYOPHILIZED, FOR SOLUTION INTRAMUSCULAR; INTRAVENOUS
Status: CANCELLED
Start: 2021-11-24

## 2021-11-23 RX ORDER — MEPERIDINE HYDROCHLORIDE 25 MG/ML
25 INJECTION INTRAMUSCULAR; INTRAVENOUS; SUBCUTANEOUS EVERY 30 MIN PRN
Status: CANCELLED | OUTPATIENT
Start: 2021-12-08

## 2021-11-23 RX ORDER — HEPARIN SODIUM,PORCINE 10 UNIT/ML
5 VIAL (ML) INTRAVENOUS
Status: CANCELLED | OUTPATIENT
Start: 2021-11-24

## 2021-11-23 RX ORDER — DOXORUBICIN HYDROCHLORIDE 2 MG/ML
60 INJECTION, SOLUTION INTRAVENOUS ONCE
Status: CANCELLED | OUTPATIENT
Start: 2021-11-24

## 2021-11-23 RX ORDER — DOXORUBICIN HYDROCHLORIDE 2 MG/ML
60 INJECTION, SOLUTION INTRAVENOUS ONCE
Status: CANCELLED | OUTPATIENT
Start: 2021-12-08

## 2021-11-23 RX ORDER — MEPERIDINE HYDROCHLORIDE 25 MG/ML
25 INJECTION INTRAMUSCULAR; INTRAVENOUS; SUBCUTANEOUS EVERY 30 MIN PRN
Status: CANCELLED | OUTPATIENT
Start: 2021-11-24

## 2021-11-23 RX ORDER — PALONOSETRON 0.05 MG/ML
0.25 INJECTION, SOLUTION INTRAVENOUS ONCE
Status: CANCELLED
Start: 2021-11-24

## 2021-11-23 RX ORDER — HEPARIN SODIUM,PORCINE 10 UNIT/ML
5 VIAL (ML) INTRAVENOUS
Status: CANCELLED | OUTPATIENT
Start: 2021-12-08

## 2021-11-24 ENCOUNTER — INFUSION THERAPY VISIT (OUTPATIENT)
Dept: INFUSION THERAPY | Facility: OTHER | Age: 66
End: 2021-11-24
Attending: INTERNAL MEDICINE
Payer: MEDICARE

## 2021-11-24 ENCOUNTER — ONCOLOGY VISIT (OUTPATIENT)
Dept: ONCOLOGY | Facility: OTHER | Age: 66
End: 2021-11-24
Attending: NURSE PRACTITIONER
Payer: MEDICARE

## 2021-11-24 VITALS
WEIGHT: 115.52 LBS | BODY MASS INDEX: 20.47 KG/M2 | TEMPERATURE: 97.5 F | OXYGEN SATURATION: 99 % | HEART RATE: 86 BPM | SYSTOLIC BLOOD PRESSURE: 140 MMHG | HEIGHT: 63 IN | DIASTOLIC BLOOD PRESSURE: 82 MMHG | RESPIRATION RATE: 20 BRPM

## 2021-11-24 VITALS
RESPIRATION RATE: 20 BRPM | OXYGEN SATURATION: 99 % | DIASTOLIC BLOOD PRESSURE: 82 MMHG | TEMPERATURE: 97.5 F | HEIGHT: 63 IN | SYSTOLIC BLOOD PRESSURE: 144 MMHG | WEIGHT: 115.52 LBS | HEART RATE: 95 BPM | BODY MASS INDEX: 20.47 KG/M2

## 2021-11-24 DIAGNOSIS — T45.1X5A CHEMOTHERAPY-INDUCED NEUTROPENIA (H): Primary | ICD-10-CM

## 2021-11-24 DIAGNOSIS — D70.1 CHEMOTHERAPY-INDUCED NEUTROPENIA (H): Primary | ICD-10-CM

## 2021-11-24 DIAGNOSIS — C50.511 MALIGNANT NEOPLASM OF LOWER-OUTER QUADRANT OF RIGHT BREAST OF FEMALE, ESTROGEN RECEPTOR POSITIVE (H): ICD-10-CM

## 2021-11-24 DIAGNOSIS — Z17.0 MALIGNANT NEOPLASM OF LOWER-OUTER QUADRANT OF RIGHT BREAST OF FEMALE, ESTROGEN RECEPTOR POSITIVE (H): ICD-10-CM

## 2021-11-24 DIAGNOSIS — T45.1X5A CHEMOTHERAPY-INDUCED NEUTROPENIA (H): ICD-10-CM

## 2021-11-24 DIAGNOSIS — D70.1 CHEMOTHERAPY-INDUCED NEUTROPENIA (H): ICD-10-CM

## 2021-11-24 DIAGNOSIS — Z17.0 MALIGNANT NEOPLASM OF LOWER-OUTER QUADRANT OF RIGHT BREAST OF FEMALE, ESTROGEN RECEPTOR POSITIVE (H): Primary | ICD-10-CM

## 2021-11-24 DIAGNOSIS — C50.511 MALIGNANT NEOPLASM OF LOWER-OUTER QUADRANT OF RIGHT BREAST OF FEMALE, ESTROGEN RECEPTOR POSITIVE (H): Primary | ICD-10-CM

## 2021-11-24 LAB
ALBUMIN SERPL-MCNC: 3.2 G/DL (ref 3.4–5)
ALP SERPL-CCNC: 101 U/L (ref 40–150)
ALT SERPL W P-5'-P-CCNC: 33 U/L (ref 0–50)
ANION GAP SERPL CALCULATED.3IONS-SCNC: 3 MMOL/L (ref 3–14)
AST SERPL W P-5'-P-CCNC: 19 U/L (ref 0–45)
BASOPHILS # BLD MANUAL: 0.1 10E3/UL (ref 0–0.2)
BASOPHILS NFR BLD MANUAL: 1 %
BILIRUB SERPL-MCNC: 0.2 MG/DL (ref 0.2–1.3)
BUN SERPL-MCNC: 12 MG/DL (ref 7–30)
CALCIUM SERPL-MCNC: 8.9 MG/DL (ref 8.5–10.1)
CANCER AG27-29 SERPL-ACNC: 53 U/ML (ref 0–39)
CHLORIDE BLD-SCNC: 109 MMOL/L (ref 94–109)
CO2 SERPL-SCNC: 27 MMOL/L (ref 20–32)
CREAT SERPL-MCNC: 0.58 MG/DL (ref 0.52–1.04)
EOSINOPHIL # BLD MANUAL: 0 10E3/UL (ref 0–0.7)
EOSINOPHIL NFR BLD MANUAL: 0 %
ERYTHROCYTE [DISTWIDTH] IN BLOOD BY AUTOMATED COUNT: 14.5 % (ref 10–15)
GFR SERPL CREATININE-BSD FRML MDRD: >90 ML/MIN/1.73M2
GLUCOSE BLD-MCNC: 100 MG/DL (ref 70–99)
HCT VFR BLD AUTO: 33 % (ref 35–47)
HGB BLD-MCNC: 10.6 G/DL (ref 11.7–15.7)
LYMPHOCYTES # BLD MANUAL: 2.3 10E3/UL (ref 0.8–5.3)
LYMPHOCYTES NFR BLD MANUAL: 18 %
MCH RBC QN AUTO: 30 PG (ref 26.5–33)
MCHC RBC AUTO-ENTMCNC: 32.1 G/DL (ref 31.5–36.5)
MCV RBC AUTO: 94 FL (ref 78–100)
METAMYELOCYTES # BLD MANUAL: 0.5 10E3/UL
METAMYELOCYTES NFR BLD MANUAL: 4 %
MONOCYTES # BLD MANUAL: 1 10E3/UL (ref 0–1.3)
MONOCYTES NFR BLD MANUAL: 8 %
NEUTROPHILS # BLD MANUAL: 8.9 10E3/UL (ref 1.6–8.3)
NEUTROPHILS NFR BLD MANUAL: 69 %
PLAT MORPH BLD: ABNORMAL
PLATELET # BLD AUTO: 232 10E3/UL (ref 150–450)
POTASSIUM BLD-SCNC: 4.1 MMOL/L (ref 3.4–5.3)
PROT SERPL-MCNC: 6.6 G/DL (ref 6.8–8.8)
RBC # BLD AUTO: 3.53 10E6/UL (ref 3.8–5.2)
RBC MORPH BLD: ABNORMAL
SODIUM SERPL-SCNC: 139 MMOL/L (ref 133–144)
WBC # BLD AUTO: 12.9 10E3/UL (ref 4–11)

## 2021-11-24 PROCEDURE — 96372 THER/PROPH/DIAG INJ SC/IM: CPT | Mod: 59 | Performed by: INTERNAL MEDICINE

## 2021-11-24 PROCEDURE — 96523 IRRIG DRUG DELIVERY DEVICE: CPT

## 2021-11-24 PROCEDURE — 86300 IMMUNOASSAY TUMOR CA 15-3: CPT | Mod: ZL

## 2021-11-24 PROCEDURE — 96365 THER/PROPH/DIAG IV INF INIT: CPT

## 2021-11-24 PROCEDURE — 96374 THER/PROPH/DIAG INJ IV PUSH: CPT

## 2021-11-24 PROCEDURE — 99213 OFFICE O/P EST LOW 20 MIN: CPT | Performed by: NURSE PRACTITIONER

## 2021-11-24 PROCEDURE — 96413 CHEMO IV INFUSION 1 HR: CPT

## 2021-11-24 PROCEDURE — 82040 ASSAY OF SERUM ALBUMIN: CPT | Mod: ZL | Performed by: INTERNAL MEDICINE

## 2021-11-24 PROCEDURE — 96375 TX/PRO/DX INJ NEW DRUG ADDON: CPT

## 2021-11-24 PROCEDURE — 258N000003 HC RX IP 258 OP 636: Performed by: INTERNAL MEDICINE

## 2021-11-24 PROCEDURE — 36591 DRAW BLOOD OFF VENOUS DEVICE: CPT | Mod: ZL

## 2021-11-24 PROCEDURE — 96411 CHEMO IV PUSH ADDL DRUG: CPT

## 2021-11-24 PROCEDURE — G0463 HOSPITAL OUTPT CLINIC VISIT: HCPCS

## 2021-11-24 PROCEDURE — 250N000011 HC RX IP 250 OP 636: Performed by: INTERNAL MEDICINE

## 2021-11-24 PROCEDURE — G0463 HOSPITAL OUTPT CLINIC VISIT: HCPCS | Mod: 25

## 2021-11-24 PROCEDURE — 96409 CHEMO IV PUSH SNGL DRUG: CPT

## 2021-11-24 PROCEDURE — 85027 COMPLETE CBC AUTOMATED: CPT | Mod: ZL | Performed by: INTERNAL MEDICINE

## 2021-11-24 PROCEDURE — 96367 TX/PROPH/DG ADDL SEQ IV INF: CPT

## 2021-11-24 PROCEDURE — 96377 APPLICATON ON-BODY INJECTOR: CPT | Mod: 59

## 2021-11-24 RX ORDER — DOXORUBICIN HYDROCHLORIDE 2 MG/ML
60 INJECTION, SOLUTION INTRAVENOUS ONCE
Status: COMPLETED | OUTPATIENT
Start: 2021-11-24 | End: 2021-11-24

## 2021-11-24 RX ORDER — PALONOSETRON 0.05 MG/ML
0.25 INJECTION, SOLUTION INTRAVENOUS ONCE
Status: COMPLETED | OUTPATIENT
Start: 2021-11-24 | End: 2021-11-24

## 2021-11-24 RX ORDER — HEPARIN SODIUM (PORCINE) LOCK FLUSH IV SOLN 100 UNIT/ML 100 UNIT/ML
5 SOLUTION INTRAVENOUS
Status: DISCONTINUED | OUTPATIENT
Start: 2021-11-24 | End: 2021-11-24 | Stop reason: HOSPADM

## 2021-11-24 RX ADMIN — HEPARIN 5 ML: 100 SYRINGE at 12:15

## 2021-11-24 RX ADMIN — DOXORUBICIN HYDROCHLORIDE 90 MG: 2 INJECTION, SOLUTION INTRAVENOUS at 11:26

## 2021-11-24 RX ADMIN — FOSAPREPITANT: 150 INJECTION, POWDER, LYOPHILIZED, FOR SOLUTION INTRAVENOUS at 10:52

## 2021-11-24 RX ADMIN — PEGFILGRASTIM 6 MG: KIT SUBCUTANEOUS at 12:09

## 2021-11-24 RX ADMIN — SODIUM CHLORIDE 250 ML: 9 INJECTION, SOLUTION INTRAVENOUS at 10:48

## 2021-11-24 RX ADMIN — CYCLOPHOSPHAMIDE 900 MG: 1 INJECTION, POWDER, FOR SOLUTION INTRAVENOUS; ORAL at 11:38

## 2021-11-24 RX ADMIN — PALONOSETRON HYDROCHLORIDE 0.25 MG: 0.25 INJECTION, SOLUTION INTRAVENOUS at 10:50

## 2021-11-24 ASSESSMENT — MIFFLIN-ST. JEOR
SCORE: 1038.13
SCORE: 1038

## 2021-11-24 ASSESSMENT — PAIN SCALES - GENERAL: PAINLEVEL: NO PAIN (0)

## 2021-11-24 NOTE — PROGRESS NOTES
Oncology Follow-up Visit:  November 24, 2021    Reason for Visit:  Patient presents with:  Oncology Clinic Visit: Follow up Malignant neoplasm of lower-outer quadrant of right breast of female, estrogen receptor positive      Nursing Note and documentation reviewed: yes    HPI: This is a 66-year-old female patient who presents to the oncology clinic today for evaluation prior to receiving cycle 3 chemotherapy for stage IIb invasive lobular carcinoma of the right breast diagnosed 4/2021.        She presents to the clinic today stating she is feeling good.  She feels she tolerated this last cycle of chemotherapy well and is no longer dealing with any nausea.  She is no longer taking the Zofran and she is changed her diet somewhat to help with her stomach and nausea issues she was having before.  She is no longer having any difficulty with constipation and stopped using the senna and is using some magnesium oxalate along with increasing fiber in her diet.  She does have some mild fatigue but states she keeps very busy.    Oncologic History:     3/30/2021 patient palpated a mass in the right breast  4/9/2021 she underwent mammogram showing an abnormality in the lower outer aspect of the right breast  4/13/2021  She underwent ultrasound-guided biopsy with pathology showing invasive lobular carcinoma, grade 2, ER positive, AK negative, HER-2/kevin negative  7/9/2021  She underwent bilateral breast MRI showing Corresponding to the ultrasound finding in the right lower breast at about the 7 o'clock position an area of significant increased enhancement and mass extends for about 2.4 x 1.0 x 1.9 cm. This is larger than the ultrasound measurements.   8/23/2021 she underwent lumpectomy with sentinel nodes by Dr. Sofy Cobb with [facility] and pathology showed a 3 cm, grade 2 invasive lobular carcinoma with inferior lateral margin positive for invasive carcinoma; 3 sentinel nodes obtained, 1 with micrometastasis  measuring 2 mm and 1 with macrometastasis and she was staged [id number]  9/15/2021 she was seen by Dr. Nunez Friday with Medical Oncology and felt the patient had an early stage breast cancer, stage II with positive margins and he recommended completion mastectomy and felt she would not likely benefit from additional axillary lymph node surgery; Oncotype DX resulted at 53 and he recommended 4 cycles of dose dense AC followed by 12 weeks of Taxol  9/27/2021  Echocardiogram showed an EF of 65%  10/11/2021  Patient was seen by Dr. Rolle with Medical Oncology at [facility] for a second opinion and he recommended a PET scan and MRI of the brain  10/11/2021  MRI of the brain was negative   10/13/2021  PET scan showed postop changes consistent with history of right breast lumpectomy and axillary node dissection and no evidence of metastatic disease  10/18/2021  She was seen again by Dr. Rolle and Staged IIB with recommendation was for AC x4 cycles followed by weekly Taxol x12  10/19/2021  Port-A-Cath placement by Dr. Chen  Echocardiogram  10/27/2021 cycle 1 Adriamycin/Cyclophosphamide     Genetics:  Referred/not completed     Current Chemo Regime/TX: Doxorubicin 60 mg per metered squared and cyclophosphamide 600 mg per metered squared given every 14 days with pegfilgrastim 6 mg subcutaneous day 2  Current Cycle:  3  # of completed cycles:  2     Previous treatment:  n/a    Past Medical History:   Diagnosis Date     Acne vulgaris      Cancer ([facility])      Chemotherapy-induced neutropenia ([facility]) 10/25/2021     Complication of anesthesia     nauseated     Premenstrual tension syndromes 10/14/2004       Past Surgical History:   Procedure Laterality Date     C OBSTETRICAL D&C       INSERT PORT VASCULAR ACCESS N/A 10/19/2021    Procedure: port-a-cath placement;  Surgeon: Reginaldo Chen MD;  Location: [facility]     LUMPECTOMY BREAST Right 08/23/2021       Family History   Problem Relation Age of Onset     Allergies Mother       Breast Cancer Mother      Diabetes Mother      Cardiovascular Paternal Grandfather      Allergies Paternal Grandfather      Angina Paternal Grandfather      Coronary Artery Disease Paternal Grandfather      Other - See Comments Father      Depression Maternal Grandmother      Cerebrovascular Disease Maternal Grandmother      Thyroid Disease Maternal Grandmother      Colon Cancer Maternal Grandfather      Diabetes Maternal Grandfather      Rheumatoid Arthritis Paternal Grandmother      Breast Cancer Cousin      Breast Cancer Maternal Aunt      Cancer Maternal Aunt         esphogeal, lung     Hypertension No family hx of      Hyperlipidemia No family hx of      Prostate Cancer No family hx of      Anesthesia Reaction No family hx of      Asthma No family hx of      Genetic Disorder No family hx of        Social History     Socioeconomic History     Marital status:      Spouse name: Not on file     Number of children: Not on file     Years of education: Not on file     Highest education level: Not on file   Occupational History     Not on file   Tobacco Use     Smoking status: Never Smoker     Smokeless tobacco: Never Used   Substance and Sexual Activity     Alcohol use: No     Drug use: No     Sexual activity: Not on file   Other Topics Concern     Parent/sibling w/ CABG, MI or angioplasty before 65F 55M? Yes   Social History Narrative     Not on file     Social Determinants of Health     Financial Resource Strain: Not on file   Food Insecurity: Not on file   Transportation Needs: Not on file   Physical Activity: Not on file   Stress: Not on file   Social Connections: Not on file   Intimate Partner Violence: Not on file   Housing Stability: Not on file       Current Outpatient Medications   Medication     dexamethasone (DECADRON) 4 MG tablet     Lysine 1000 MG TABS     methylphenidate (RITALIN) 10 MG tablet     NONFORMULARY     NONFORMULARY     NONFORMULARY     pseudoePHEDrine (SUDAFED) 30 MG tablet      Turmeric Curcumin 500 MG CAPS     clindamycin-benzoyl peroxide (BENZACLIN) 1-5 % external gel     dapsone 5 % topical gel     mupirocin (BACTROBAN) 2 % external ointment     OLANZapine (ZYPREXA) 10 MG tablet     ondansetron (ZOFRAN) 8 MG tablet     prochlorperazine (COMPAZINE) 10 MG tablet     valACYclovir (VALTREX) 1000 mg tablet     vitamin B complex with vitamin C (VITAMIN  B COMPLEX) tablet     No current facility-administered medications for this visit.        Allergies   Allergen Reactions     Vancomycin Other (See Comments)     Legs went numb- side effect     Amoxicillin Unknown     Headache  Stomach aches  Augmentin     Amoxicillin Trihydrate Other (See Comments)     Headache  Stomach aches  Augmentin         Clavulanic Acid Potassium Other (See Comments)     Headaches  Stomach aches  Augmentin       Levaquin [Levofloxacin] Other (See Comments)     Numbness in leg       Review Of Systems:  Constitutional:    denies fever, weight changes, chills, and night sweats.  Eyes:    denies blurred or double vision  Ears/Nose/Throat:   denies ear pain, nose problems, difficulty swallowing  Respiratory:   denies shortness of breath, cough   Skin:   denies rash, lesions  Breast/Chest wall:   denies pain, lumps or discharge  Cardiovascular:   denies chest pain, palpitations, edema  Gastrointestinal:   denies abdominal pain, bloating, nausea, early satiety; no change in bowel habits or blood in stool-no issues with constipation  Genitourinary:   denies difficulty with urination, blood in urine  Musculoskeletal:    denies new muscle pain, bone pain  Neurologic:   denies lightheadedness, headaches, numbness or tingling  Psychiatric:   denies anxiety, depression  Hematologic/Lymphatic/Immunologic:   denies easy bruising, easy bleeding, lumps or bumps noted  Endocrine:   Dry mouth at times-has increased fluids      ECOG Performance Status: 1    Physical Exam:  BP (!) 144/82   Pulse 95   Temp 97.5  F (36.4  C) (Tympanic)    "Resp 20   Ht 1.6 m (5' 3\")   Wt 52.4 kg (115 lb 8.3 oz)   SpO2 99%   BMI 20.46 kg/m      GENERAL APPEARANCE: Healthy, alert and in no acute distress.  HEENT: Normocephalic, alopecia, sclerae anicteric.  No oral lesions or thrush  NECK:   No asymmetry or masses, no thyromegaly.  LYMPHATICS: No palpable cervical, supraclavicular, axillary, or inguinal nodes   RESP: Lungs clear to auscultation bilaterally, respirations regular and easy  CARDIOVASCULAR: Regular rate and rhythm. Normal S1, S2; no murmur, gallop, or rub.  ABDOMEN: Soft, nontender. Bowel sounds auscultated all 4 quadrants. No palpable organomegaly or masses.  MUSCULOSKELETAL: Extremities without gross deformities noted. No edema of bilateral lower extremities.  NEURO: Alert and oriented x 3.  Gait steady.  PSYCHIATRIC: Mentation and affect appear normal.  Mood appropriate.    Laboratory:  Results for orders placed or performed in visit on 11/24/21   Comprehensive metabolic panel     Status: Abnormal   Result Value Ref Range    Sodium 139 133 - 144 mmol/L    Potassium 4.1 3.4 - 5.3 mmol/L    Chloride 109 94 - 109 mmol/L    Carbon Dioxide (CO2) 27 20 - 32 mmol/L    Anion Gap 3 3 - 14 mmol/L    Urea Nitrogen 12 7 - 30 mg/dL    Creatinine 0.58 0.52 - 1.04 mg/dL    Calcium 8.9 8.5 - 10.1 mg/dL    Glucose 100 (H) 70 - 99 mg/dL    Alkaline Phosphatase 101 40 - 150 U/L    AST 19 0 - 45 U/L    ALT 33 0 - 50 U/L    Protein Total 6.6 (L) 6.8 - 8.8 g/dL    Albumin 3.2 (L) 3.4 - 5.0 g/dL    Bilirubin Total 0.2 0.2 - 1.3 mg/dL    GFR Estimate >90 >60 mL/min/1.73m2   CBC with platelets and differential     Status: Abnormal   Result Value Ref Range    WBC Count 12.9 (H) 4.0 - 11.0 10e3/uL    RBC Count 3.53 (L) 3.80 - 5.20 10e6/uL    Hemoglobin 10.6 (L) 11.7 - 15.7 g/dL    Hematocrit 33.0 (L) 35.0 - 47.0 %    MCV 94 78 - 100 fL    MCH 30.0 26.5 - 33.0 pg    MCHC 32.1 31.5 - 36.5 g/dL    RDW 14.5 10.0 - 15.0 %    Platelet Count 232 150 - 450 10e3/uL   Manual " Differential     Status: Abnormal   Result Value Ref Range    % Neutrophils 69 %    % Lymphocytes 18 %    % Monocytes 8 %    % Eosinophils 0 %    % Basophils 1 %    % Metamyelocytes 4 %    Absolute Neutrophils 8.9 (H) 1.6 - 8.3 10e3/uL    Absolute Lymphocytes 2.3 0.8 - 5.3 10e3/uL    Absolute Monocytes 1.0 0.0 - 1.3 10e3/uL    Absolute Eosinophils 0.0 0.0 - 0.7 10e3/uL    Absolute Basophils 0.1 0.0 - 0.2 10e3/uL    Absolute Metamyelocytes 0.5 (H) <=0.0 10e3/uL    RBC Morphology Confirmed RBC Indices     Platelet Assessment  Automated Count Confirmed. Platelet morphology is normal.     Automated Count Confirmed. Platelet morphology is normal.   CBC with platelets differential     Status: Abnormal    Narrative    The following orders were created for panel order CBC with platelets differential.  Procedure                               Abnormality         Status                     ---------                               -----------         ------                     CBC with platelets and d...[[id number]]  Abnormal            Final result               Manual Differential[[id number]]          Abnormal            Final result                 Please view results for these tests on the individual orders.       Imaging Studies:  None completed for today's visit      ASSESSMENT/PLAN:    #1 Breast cancer: Stage IIB invasive lobular carcinoma of the right breast diagnosed 4/2021 and currently undergoing therapy with Adriamycin and cyclophosphamide.  She will receive cycle 3 therapy today and follow-up prior to cycle 4 with labs per treatment plan.    We discussed her plan after therapy and she does plan to follow through with bilateral mastectomies.     #2  Anemia:   Iron studies on 11/10/2021 were within normal limits with a slightly elevated ferritin.  Anemia is likely multifactorial     #3  Chemotherapy induced neutropenia:  Pegfilgrastim 6mg day 2.     I encouraged patient to call with any questions or concerns.    28  minutes spent in the patient's encounter today with time spent in review of the chart along with in chart preparation.  Time was also spent in review of her treatment plan.  Time was also spent obtaining a review of systems and performing a physical exam along with review of lab work with the patient in detail.  Time was also spent in her plan for further surgery after treatment is completed.  Time spent in reviewing her next follow-up and in chart documentation.    Valentina Avila, NP  APRN, FNP-BC, AOCNP

## 2021-11-24 NOTE — PATIENT INSTRUCTIONS
We would like to see you back per your schedule.     When you are in need of a refill please call your pharmacy and they will send us a request.     If you have any questions please call [phone]    Other instructions:  none

## 2021-11-24 NOTE — PROGRESS NOTES
Patients power port accessed using non-coring, 19 gauge, 3/4 inch needle.Mask donned by caregiver: yes Site prepped with CHG: yes Labs drawn: yes Dressing applied using aseptic technique: yes.     Port accessed per facility protocol. Port flushed easily, blood return noted.  No signs and symptoms of infection or infiltration.  Port flushed with 10mL normal saline, blood return noted, 10 mLs blood discarded. 3 tube(s) taken for ordered labs, port flushed with 20 mLs normal saline.  Port left accessed as patient has appointment with Jeana Avila, and is then due for chemo if parameters are met.  Patient discharged with no complaints.

## 2021-11-24 NOTE — PROGRESS NOTES
Your On-body Neulasta injector was applied today at 12:15pm.  The injection will start 27 hours after application, at 3:15pm tomorrow.  Note: the medication will be delivered over 45 minutes.  Before removing the injector, check to see that the light is either solid green or off and that the indicator line is on empty.  If there is a red light on the injector at any time or wetness on the dressing or under the injector after removal, you must report this information.   Call [phone] during business hours to report problems.  Refer to the written information given to you for additional questions    Ref:[id number]  Lot:[id number]  Exp:12/2023    Lot:[id number]  Exp:09/2022

## 2021-11-24 NOTE — PROGRESS NOTES
Patient is a 66 year old here today for infusion of adriamycin/cytotoxan under the orders of Dr. Rolle.    Component      Latest Ref Rng & Units 11/24/2021   Sodium      133 - 144 mmol/L 139   Potassium      3.4 - 5.3 mmol/L 4.1   Chloride      94 - 109 mmol/L 109   Carbon Dioxide      20 - 32 mmol/L 27   Anion Gap      3 - 14 mmol/L 3   Urea Nitrogen      7 - 30 mg/dL 12   Creatinine      0.52 - 1.04 mg/dL 0.58   Calcium      8.5 - 10.1 mg/dL 8.9   Glucose      70 - 99 mg/dL 100 (H)   Alkaline Phosphatase      40 - 150 U/L 101   AST      0 - 45 U/L 19   ALT      0 - 50 U/L 33   Protein Total      6.8 - 8.8 g/dL 6.6 (L)   Albumin      3.4 - 5.0 g/dL 3.2 (L)   Bilirubin Total      0.2 - 1.3 mg/dL 0.2   GFR Estimate      >60 mL/min/1.73m2 >90   % Neutrophils      % 69   % Lymphocytes      % 18   % Monocytes      % 8   % Eosinophils      % 0   % Basophils      % 1   % Metamyelocytes      % 4   Absolute Neutrophil      1.6 - 8.3 10e3/uL 8.9 (H)   Absolute Lymphocytes      0.8 - 5.3 10e3/uL 2.3   Absolute Monocytes      0.0 - 1.3 10e3/uL 1.0   Absolute Eosinophils      0.0 - 0.7 10e3/uL 0.0   Absolute Basophils      0.0 - 0.2 10e3/uL 0.1   Absolute Metamyelocytes      <=0.0 10e3/uL 0.5 (H)   RBC Morphology       Confirmed RBC Indices   Platelet Morphology      Automated Count Confirmed. Platelet morphology is normal. Automated Count Confirmed. Platelet morphology is normal.   WBC      4.0 - 11.0 10e3/uL 12.9 (H)   RBC Count      3.80 - 5.20 10e6/uL 3.53 (L)   Hemoglobin      11.7 - 15.7 g/dL 10.6 (L)   Hematocrit      35.0 - 47.0 % 33.0 (L)   MCV      78 - 100 fL 94   MCH      26.5 - 33.0 pg 30.0   MCHC      31.5 - 36.5 g/dL 32.1   RDW      10.0 - 15.0 % 14.5   Platelet Count      150 - 450 10e3/uL 232       Adriamycin/Cytoxan dose(s) verified with ROSEMARY Garcia RN prior to release of drug.    Patient meets parameters for today's infusion.  Denies questions or concerns regarding today's infusion and/or medications  being administered.      Patient identified with two identifiers, order verified, and verbal consent for today's infusion obtained from patient.    1126 IV push verified with Adriamycin dose, drug, and rate of administration. Infusion administered per protocol. Patient tolerated infusion well, no signs or symptoms of adverse reaction noted. Patient denies pain nor discomfort.     1138 IV pump verified with cytoxan dose, drug, and rate of administration. Infusion administered per protocol. Patient tolerated infusion well, no signs or symptoms of adverse reaction noted. Patient denies pain nor discomfort.     Needle removed, tip intact. Site clean, dry and intact. Covered with a sterile bandage, slight pressure applied for 30 seconds. Pt instructed to leave bandage intact for a minimum of one hour, and to call with questions or concerns. Copy of appointments, discharge instructions, and after visit summary (AVS) provided to patient. Patient states understanding, discharged ambulatory.

## 2021-11-24 NOTE — PATIENT INSTRUCTIONS
Thank you for scheduling your appointment with us today. If you have any questions or concerns related to procedure/medication at today's visit, please contact your primary care provider, or the provider who ordered today's procedure/medication. If you have any questions related to scheduling with our unit, or if you are having trouble getting in contact with your ordering provider, we can be reached at [phone].       Your On-body Neulasta injector was applied today at 12:15.  The injection will start 27 hours after application, at 3:15 tomorrow.  Note: the medication will be delivered over 45 minutes.  Before removing the injector, check to see that the light is either solid green or off and that the indicator line is on empty.  If there is a red light on the injector at any time or wetness on the dressing or under the injector after removal, you must report this information.   Call [phone] during business hours to report problems.  Refer to the written information given to you for additional questions    Ref:[id number]  Lot:[id number]  Exp:12/2023    Lot:[id number]  Exp:09/2022

## 2021-11-24 NOTE — NURSING NOTE
"Oncology Rooming Note    November 24, 2021 9:16 AM   Anne Marie Nix is a 66 year old female who presents for:    Chief Complaint   Patient presents with     Oncology Clinic Visit     Follow up Malignant neoplasm of lower-outer quadrant of right breast of female, estrogen receptor positive      Initial Vitals: BP (!) 144/82   Pulse 95   Temp 97.5  F (36.4  C) (Tympanic)   Resp 20   Ht 1.6 m (5' 3\")   Wt 52.4 kg (115 lb 8.3 oz)   SpO2 99%   BMI 20.46 kg/m   Estimated body mass index is 20.46 kg/m  as calculated from the following:    Height as of this encounter: 1.6 m (5' 3\").    Weight as of this encounter: 52.4 kg (115 lb 8.3 oz). Body surface area is 1.53 meters squared.  No Pain (0) Comment: Data Unavailable   No LMP recorded. Patient is postmenopausal.  Allergies reviewed: Yes  Medications reviewed: Yes    Medications: Medication refills not needed today.  Pharmacy name entered into [facility]:    [facility] #[id number] - [place], [place] - [address]  [id number] - [place] - [address] [facility] [address]  [facility][id number] - [place], [place] - [address] [facility] [address]  [facility] - [place], [place] - [address]        Gaby Drew LPN            "

## 2021-12-08 ENCOUNTER — LAB (OUTPATIENT)
Dept: INFUSION THERAPY | Facility: OTHER | Age: 66
End: 2021-12-08
Attending: INTERNAL MEDICINE
Payer: MEDICARE

## 2021-12-08 ENCOUNTER — HOSPITAL ENCOUNTER (OUTPATIENT)
Dept: GENERAL RADIOLOGY | Facility: HOSPITAL | Age: 66
End: 2021-12-08
Attending: NURSE PRACTITIONER
Payer: MEDICARE

## 2021-12-08 ENCOUNTER — ONCOLOGY VISIT (OUTPATIENT)
Dept: ONCOLOGY | Facility: OTHER | Age: 66
End: 2021-12-08
Attending: NURSE PRACTITIONER
Payer: MEDICARE

## 2021-12-08 VITALS — DIASTOLIC BLOOD PRESSURE: 77 MMHG | SYSTOLIC BLOOD PRESSURE: 127 MMHG | HEART RATE: 79 BPM

## 2021-12-08 VITALS
HEART RATE: 93 BPM | DIASTOLIC BLOOD PRESSURE: 76 MMHG | HEIGHT: 63 IN | RESPIRATION RATE: 20 BRPM | WEIGHT: 119.05 LBS | OXYGEN SATURATION: 99 % | SYSTOLIC BLOOD PRESSURE: 147 MMHG | TEMPERATURE: 96.7 F | BODY MASS INDEX: 21.09 KG/M2

## 2021-12-08 DIAGNOSIS — C50.511 MALIGNANT NEOPLASM OF LOWER-OUTER QUADRANT OF RIGHT BREAST OF FEMALE, ESTROGEN RECEPTOR POSITIVE (H): ICD-10-CM

## 2021-12-08 DIAGNOSIS — M25.551 HIP PAIN, RIGHT: ICD-10-CM

## 2021-12-08 DIAGNOSIS — D70.1 CHEMOTHERAPY-INDUCED NEUTROPENIA (H): Primary | ICD-10-CM

## 2021-12-08 DIAGNOSIS — Z17.0 MALIGNANT NEOPLASM OF LOWER-OUTER QUADRANT OF RIGHT BREAST OF FEMALE, ESTROGEN RECEPTOR POSITIVE (H): Primary | ICD-10-CM

## 2021-12-08 DIAGNOSIS — Z17.0 MALIGNANT NEOPLASM OF LOWER-OUTER QUADRANT OF RIGHT BREAST OF FEMALE, ESTROGEN RECEPTOR POSITIVE (H): ICD-10-CM

## 2021-12-08 DIAGNOSIS — R74.01 ELEVATED ALT MEASUREMENT: ICD-10-CM

## 2021-12-08 DIAGNOSIS — C50.511 MALIGNANT NEOPLASM OF LOWER-OUTER QUADRANT OF RIGHT BREAST OF FEMALE, ESTROGEN RECEPTOR POSITIVE (H): Primary | ICD-10-CM

## 2021-12-08 DIAGNOSIS — D70.1 CHEMOTHERAPY-INDUCED NEUTROPENIA (H): ICD-10-CM

## 2021-12-08 DIAGNOSIS — T45.1X5A CHEMOTHERAPY-INDUCED NEUTROPENIA (H): Primary | ICD-10-CM

## 2021-12-08 DIAGNOSIS — T45.1X5A CHEMOTHERAPY-INDUCED NEUTROPENIA (H): ICD-10-CM

## 2021-12-08 DIAGNOSIS — D64.9 ANEMIA, UNSPECIFIED TYPE: ICD-10-CM

## 2021-12-08 LAB
ALBUMIN SERPL-MCNC: 3.2 G/DL (ref 3.4–5)
ALP SERPL-CCNC: 132 U/L (ref 40–150)
ALT SERPL W P-5'-P-CCNC: 92 U/L (ref 0–50)
ANION GAP SERPL CALCULATED.3IONS-SCNC: 6 MMOL/L (ref 3–14)
AST SERPL W P-5'-P-CCNC: 38 U/L (ref 0–45)
BASOPHILS # BLD MANUAL: 0.3 10E3/UL (ref 0–0.2)
BASOPHILS NFR BLD MANUAL: 2 %
BILIRUB SERPL-MCNC: 0.2 MG/DL (ref 0.2–1.3)
BUN SERPL-MCNC: 15 MG/DL (ref 7–30)
CALCIUM SERPL-MCNC: 8.7 MG/DL (ref 8.5–10.1)
CHLORIDE BLD-SCNC: 107 MMOL/L (ref 94–109)
CO2 SERPL-SCNC: 25 MMOL/L (ref 20–32)
CREAT SERPL-MCNC: 0.58 MG/DL (ref 0.52–1.04)
EOSINOPHIL # BLD MANUAL: 0 10E3/UL (ref 0–0.7)
EOSINOPHIL NFR BLD MANUAL: 0 %
ERYTHROCYTE [DISTWIDTH] IN BLOOD BY AUTOMATED COUNT: 18 % (ref 10–15)
GFR SERPL CREATININE-BSD FRML MDRD: >90 ML/MIN/1.73M2
GLUCOSE BLD-MCNC: 93 MG/DL (ref 70–99)
HCT VFR BLD AUTO: 30.3 % (ref 35–47)
HGB BLD-MCNC: 9.7 G/DL (ref 11.7–15.7)
LYMPHOCYTES # BLD MANUAL: 2.1 10E3/UL (ref 0.8–5.3)
LYMPHOCYTES NFR BLD MANUAL: 14 %
MCH RBC QN AUTO: 30.6 PG (ref 26.5–33)
MCHC RBC AUTO-ENTMCNC: 32 G/DL (ref 31.5–36.5)
MCV RBC AUTO: 96 FL (ref 78–100)
METAMYELOCYTES # BLD MANUAL: 1 10E3/UL
METAMYELOCYTES NFR BLD MANUAL: 7 %
MONOCYTES # BLD MANUAL: 0.9 10E3/UL (ref 0–1.3)
MONOCYTES NFR BLD MANUAL: 6 %
MYELOCYTES # BLD MANUAL: 0.6 10E3/UL
MYELOCYTES NFR BLD MANUAL: 4 %
NEUTROPHILS # BLD MANUAL: 10 10E3/UL (ref 1.6–8.3)
NEUTROPHILS NFR BLD MANUAL: 67 %
PLAT MORPH BLD: ABNORMAL
PLATELET # BLD AUTO: 305 10E3/UL (ref 150–450)
POTASSIUM BLD-SCNC: 3.9 MMOL/L (ref 3.4–5.3)
PROT SERPL-MCNC: 6.6 G/DL (ref 6.8–8.8)
RBC # BLD AUTO: 3.17 10E6/UL (ref 3.8–5.2)
RBC MORPH BLD: ABNORMAL
SODIUM SERPL-SCNC: 138 MMOL/L (ref 133–144)
WBC # BLD AUTO: 14.9 10E3/UL (ref 4–11)

## 2021-12-08 PROCEDURE — 96413 CHEMO IV INFUSION 1 HR: CPT

## 2021-12-08 PROCEDURE — 85027 COMPLETE CBC AUTOMATED: CPT | Mod: ZL | Performed by: INTERNAL MEDICINE

## 2021-12-08 PROCEDURE — 250N000011 HC RX IP 250 OP 636: Performed by: INTERNAL MEDICINE

## 2021-12-08 PROCEDURE — 36591 DRAW BLOOD OFF VENOUS DEVICE: CPT | Mod: ZL

## 2021-12-08 PROCEDURE — 96409 CHEMO IV PUSH SNGL DRUG: CPT

## 2021-12-08 PROCEDURE — 73502 X-RAY EXAM HIP UNI 2-3 VIEWS: CPT

## 2021-12-08 PROCEDURE — 82040 ASSAY OF SERUM ALBUMIN: CPT | Mod: ZL

## 2021-12-08 PROCEDURE — 258N000003 HC RX IP 258 OP 636: Performed by: INTERNAL MEDICINE

## 2021-12-08 PROCEDURE — 96411 CHEMO IV PUSH ADDL DRUG: CPT

## 2021-12-08 PROCEDURE — G0463 HOSPITAL OUTPT CLINIC VISIT: HCPCS | Mod: 25

## 2021-12-08 PROCEDURE — 99213 OFFICE O/P EST LOW 20 MIN: CPT | Performed by: NURSE PRACTITIONER

## 2021-12-08 PROCEDURE — 96417 CHEMO IV INFUS EACH ADDL SEQ: CPT

## 2021-12-08 PROCEDURE — G0463 HOSPITAL OUTPT CLINIC VISIT: HCPCS

## 2021-12-08 PROCEDURE — 96372 THER/PROPH/DIAG INJ SC/IM: CPT | Performed by: INTERNAL MEDICINE

## 2021-12-08 PROCEDURE — 96367 TX/PROPH/DG ADDL SEQ IV INF: CPT

## 2021-12-08 PROCEDURE — 96523 IRRIG DRUG DELIVERY DEVICE: CPT

## 2021-12-08 PROCEDURE — 96375 TX/PRO/DX INJ NEW DRUG ADDON: CPT

## 2021-12-08 PROCEDURE — 96377 APPLICATON ON-BODY INJECTOR: CPT

## 2021-12-08 RX ORDER — HEPARIN SODIUM (PORCINE) LOCK FLUSH IV SOLN 100 UNIT/ML 100 UNIT/ML
5 SOLUTION INTRAVENOUS
Status: DISCONTINUED | OUTPATIENT
Start: 2021-12-08 | End: 2021-12-08 | Stop reason: HOSPADM

## 2021-12-08 RX ORDER — PALONOSETRON 0.05 MG/ML
0.25 INJECTION, SOLUTION INTRAVENOUS ONCE
Status: COMPLETED | OUTPATIENT
Start: 2021-12-08 | End: 2021-12-08

## 2021-12-08 RX ORDER — DOXORUBICIN HYDROCHLORIDE 2 MG/ML
60 INJECTION, SOLUTION INTRAVENOUS ONCE
Status: COMPLETED | OUTPATIENT
Start: 2021-12-08 | End: 2021-12-08

## 2021-12-08 RX ADMIN — SODIUM CHLORIDE 250 ML: 9 INJECTION, SOLUTION INTRAVENOUS at 11:32

## 2021-12-08 RX ADMIN — HEPARIN 5 ML: 100 SYRINGE at 12:58

## 2021-12-08 RX ADMIN — DOXORUBICIN HYDROCHLORIDE 90 MG: 2 INJECTION, SOLUTION INTRAVENOUS at 12:04

## 2021-12-08 RX ADMIN — PALONOSETRON 0.25 MG: 0.05 INJECTION, SOLUTION INTRAVENOUS at 11:32

## 2021-12-08 RX ADMIN — FOSAPREPITANT: 150 INJECTION, POWDER, LYOPHILIZED, FOR SOLUTION INTRAVENOUS at 11:32

## 2021-12-08 RX ADMIN — PEGFILGRASTIM 6 MG: KIT SUBCUTANEOUS at 12:28

## 2021-12-08 RX ADMIN — CYCLOPHOSPHAMIDE 900 MG: 1 INJECTION, POWDER, FOR SOLUTION INTRAVENOUS; ORAL at 12:21

## 2021-12-08 ASSESSMENT — PAIN SCALES - GENERAL: PAINLEVEL: MILD PAIN (3)

## 2021-12-08 ASSESSMENT — MIFFLIN-ST. JEOR: SCORE: 1054.13

## 2021-12-08 NOTE — PROGRESS NOTES
Oncology Follow-up Visit:  December 8, 2021    Reason for Visit:  Patient presents with:  Oncology Clinic Visit: Follow up Malignant neoplasm of lower-outer quadrant of right breast of female, estrogen receptor positive      Nursing Note and documentation reviewed: yes    HPI: This is a 66-year-old female patient who presents to the oncology clinic today for evaluation prior to receiving cycle 4 chemotherapy for stage IIB invasive lobular carcinoma of the right breast diagnosed 4/2021.      Patient is seen back in the infusion area while seated in the infusion chair.  She tells me she is doing good with the chemotherapy.  She fell about a week and a half ago while walking outside.  She slipped on ice.  She fell onto her right hip and has had some swelling and bruising to the right hip along with discomfort.  She is able to bear weight and feels that it is improving but has taken Tylenol for the last 2-1/2 days.  Otherwise she has no new areas of pain.    She states her weight is up a little bit and she is eating well.  She does get a little swelling to her ankles and feet.    Oncologic History:     3/30/2021 patient palpated a mass in the right breast  4/9/2021 she underwent mammogram showing an abnormality in the lower outer aspect of the right breast  4/13/2021  She underwent ultrasound-guided biopsy with pathology showing invasive lobular carcinoma, grade 2, ER positive, NJ negative, HER-2/kevin negative  7/9/2021  She underwent bilateral breast MRI showing Corresponding to the ultrasound finding in the right lower breast at about the 7 o'clock position an area of significant increased enhancement and mass extends for about 2.4 x 1.0 x 1.9 cm. This is larger than the ultrasound measurements.   8/23/2021 she underwent lumpectomy with sentinel nodes by Dr. Sofy Cobb with [facility] and pathology showed a 3 cm, grade 2 invasive lobular carcinoma with inferior lateral margin positive for invasive  carcinoma; 3 sentinel nodes obtained, 1 with micrometastasis measuring 2 mm and 1 with macrometastasis and she was staged [id number]  9/15/2021 she was seen by Dr. Nunez Friday with Medical Oncology and felt the patient had an early stage breast cancer, stage II with positive margins and he recommended completion mastectomy and felt she would not likely benefit from additional axillary lymph node surgery; Oncotype DX resulted at 53 and he recommended 4 cycles of dose dense AC followed by 12 weeks of Taxol  9/27/2021  Echocardiogram showed an EF of 65%  10/11/2021  Patient was seen by Dr. Rolle with Medical Oncology at [facility] for a second opinion and he recommended a PET scan and MRI of the brain  10/11/2021  MRI of the brain was negative   10/13/2021  PET scan showed postop changes consistent with history of right breast lumpectomy and axillary node dissection and no evidence of metastatic disease  10/18/2021  She was seen again by Dr. Rolle and Staged IIB with recommendation was for AC x4 cycles followed by weekly Taxol x12  10/19/2021  Port-A-Cath placement by Dr. Chen  Echocardiogram  10/27/2021 cycle 1 Adriamycin/Cyclophosphamide     Genetics:  Referred/not completed     Current Chemo Regime/TX: Doxorubicin 60 mg per metered squared and cyclophosphamide 600 mg per metered squared given every 14 days with pegfilgrastim 6 mg subcutaneous day 2  Current Cycle:  4  # of completed cycles:  3     Previous treatment:  n/a    Past Medical History:   Diagnosis Date     Acne vulgaris      Cancer ([facility])      Chemotherapy-induced neutropenia ([facility]) 10/25/2021     Complication of anesthesia     nauseated     Premenstrual tension syndromes 10/14/2004       Past Surgical History:   Procedure Laterality Date     C OBSTETRICAL D&C       INSERT PORT VASCULAR ACCESS N/A 10/19/2021    Procedure: port-a-cath placement;  Surgeon: Reginaldo Chen MD;  Location: [facility]     LUMPECTOMY BREAST Right 08/23/2021       Family History    Problem Relation Age of Onset     Allergies Mother      Breast Cancer Mother      Diabetes Mother      Cardiovascular Paternal Grandfather      Allergies Paternal Grandfather      Angina Paternal Grandfather      Coronary Artery Disease Paternal Grandfather      Other - See Comments Father      Depression Maternal Grandmother      Cerebrovascular Disease Maternal Grandmother      Thyroid Disease Maternal Grandmother      Colon Cancer Maternal Grandfather      Diabetes Maternal Grandfather      Rheumatoid Arthritis Paternal Grandmother      Breast Cancer Cousin      Breast Cancer Maternal Aunt      Cancer Maternal Aunt         esphogeal, lung     Hypertension No family hx of      Hyperlipidemia No family hx of      Prostate Cancer No family hx of      Anesthesia Reaction No family hx of      Asthma No family hx of      Genetic Disorder No family hx of        Social History     Socioeconomic History     Marital status:      Spouse name: Not on file     Number of children: Not on file     Years of education: Not on file     Highest education level: Not on file   Occupational History     Not on file   Tobacco Use     Smoking status: Never Smoker     Smokeless tobacco: Never Used   Substance and Sexual Activity     Alcohol use: No     Drug use: No     Sexual activity: Not on file   Other Topics Concern     Parent/sibling w/ CABG, MI or angioplasty before 65F 55M? Yes   Social History Narrative     Not on file     Social Determinants of Health     Financial Resource Strain: Not on file   Food Insecurity: Not on file   Transportation Needs: Not on file   Physical Activity: Not on file   Stress: Not on file   Social Connections: Not on file   Intimate Partner Violence: Not on file   Housing Stability: Not on file       Current Outpatient Medications   Medication     dexamethasone (DECADRON) 4 MG tablet     Lysine 1000 MG TABS     methylphenidate (RITALIN) 10 MG tablet     NONFORMULARY     NONFORMULARY      NONFORMULARY     pseudoePHEDrine (SUDAFED) 30 MG tablet     Turmeric Curcumin 500 MG CAPS     vitamin B complex with vitamin C (VITAMIN  B COMPLEX) tablet     clindamycin-benzoyl peroxide (BENZACLIN) 1-5 % external gel     dapsone 5 % topical gel     mupirocin (BACTROBAN) 2 % external ointment     OLANZapine (ZYPREXA) 10 MG tablet     ondansetron (ZOFRAN) 8 MG tablet     prochlorperazine (COMPAZINE) 10 MG tablet     valACYclovir (VALTREX) 1000 mg tablet     No current facility-administered medications for this visit.        Allergies   Allergen Reactions     Vancomycin Other (See Comments)     Legs went numb- side effect     Amoxicillin Unknown     Headache  Stomach aches  Augmentin     Amoxicillin Trihydrate Other (See Comments)     Headache  Stomach aches  Augmentin         Clavulanic Acid Potassium Other (See Comments)     Headaches  Stomach aches  Augmentin       Levaquin [Levofloxacin] Other (See Comments)     Numbness in leg       Review Of Systems:  Constitutional:    denies fever, weight loss, chills, and night sweats.  Eyes:    denies blurred or double vision  Ears/Nose/Throat:   denies ear pain, nose problems, difficulty swallowing  Respiratory:   denies shortness of breath, cough  Skin:   denies rash, lesions  Breast/Chest wall:   denies pain, lumps or discharge  Cardiovascular:   denies chest pain, palpitations, edema  Gastrointestinal:   denies abdominal pain, bloating, nausea, early satiety; no change in bowel habits or blood in stool  Genitourinary:   denies difficulty with urination, blood in urine  Musculoskeletal:    See HPI  Neurologic:   denies lightheadedness, headaches, numbness or tingling  Psychiatric:   denies anxiety, depression  Hematologic/Lymphatic/Immunologic:   denies easy bruising, easy bleeding, lumps or bumps noted  Endocrine:   Denies increased thirst      ECOG Performance Status: 0    Physical Exam:  BP (!) 147/76   Pulse 93   Temp (!) 96.7  F (35.9  C) (Tympanic)   Resp 20    "Ht 1.6 m (5' 3\")   Wt 54 kg (119 lb 0.8 oz)   SpO2 99%   BMI 21.09 kg/m      GENERAL APPEARANCE: Healthy, alert and in no acute distress.  HEENT: Normocephalic, Sclerae anicteric.   NECK:   No asymmetry or masses, no thyromegaly.  LYMPHATICS: No palpable cervical, supraclavicular  inguinal nodes   RESP: Lungs clear to auscultation bilaterally, respirations regular and easy  CARDIOVASCULAR: Regular rate and rhythm. Normal S1, S2; no murmur, gallop, or rub.  MUSCULOSKELETAL: Extremities without gross deformities noted. No edema of bilateral lower extremities.  Large bruise to right hip  NEURO: Alert and oriented x 3.  PSYCHIATRIC: Mentation and affect appear normal.  Mood appropriate.    Laboratory:  Results for orders placed or performed in visit on 12/08/21   Comprehensive metabolic panel     Status: Abnormal   Result Value Ref Range    Sodium 138 133 - 144 mmol/L    Potassium 3.9 3.4 - 5.3 mmol/L    Chloride 107 94 - 109 mmol/L    Carbon Dioxide (CO2) 25 20 - 32 mmol/L    Anion Gap 6 3 - 14 mmol/L    Urea Nitrogen 15 7 - 30 mg/dL    Creatinine 0.58 0.52 - 1.04 mg/dL    Calcium 8.7 8.5 - 10.1 mg/dL    Glucose 93 70 - 99 mg/dL    Alkaline Phosphatase 132 40 - 150 U/L    AST 38 0 - 45 U/L    ALT 92 (H) 0 - 50 U/L    Protein Total 6.6 (L) 6.8 - 8.8 g/dL    Albumin 3.2 (L) 3.4 - 5.0 g/dL    Bilirubin Total 0.2 0.2 - 1.3 mg/dL    GFR Estimate >90 >60 mL/min/1.73m2   CBC with platelets and differential     Status: Abnormal   Result Value Ref Range    WBC Count 14.9 (H) 4.0 - 11.0 10e3/uL    RBC Count 3.17 (L) 3.80 - 5.20 10e6/uL    Hemoglobin 9.7 (L) 11.7 - 15.7 g/dL    Hematocrit 30.3 (L) 35.0 - 47.0 %    MCV 96 78 - 100 fL    MCH 30.6 26.5 - 33.0 pg    MCHC 32.0 31.5 - 36.5 g/dL    RDW 18.0 (H) 10.0 - 15.0 %    Platelet Count 305 150 - 450 10e3/uL   Manual Differential     Status: Abnormal   Result Value Ref Range    % Neutrophils 67 %    % Lymphocytes 14 %    % Monocytes 6 %    % Eosinophils 0 %    % Basophils 2 % "    % Metamyelocytes 7 %    % Myelocytes 4 %    Absolute Neutrophils 10.0 (H) 1.6 - 8.3 10e3/uL    Absolute Lymphocytes 2.1 0.8 - 5.3 10e3/uL    Absolute Monocytes 0.9 0.0 - 1.3 10e3/uL    Absolute Eosinophils 0.0 0.0 - 0.7 10e3/uL    Absolute Basophils 0.3 (H) 0.0 - 0.2 10e3/uL    Absolute Metamyelocytes 1.0 (H) <=0.0 10e3/uL    Absolute Myelocytes 0.6 (H) <=0.0 10e3/uL    RBC Morphology Confirmed RBC Indices     Platelet Assessment  Automated Count Confirmed. Platelet morphology is normal.     Automated Count Confirmed. Platelet morphology is normal.   CBC with platelets differential     Status: Abnormal    Narrative    The following orders were created for panel order CBC with platelets differential.  Procedure                               Abnormality         Status                     ---------                               -----------         ------                     CBC with platelets and d...[[id number]]  Abnormal            Final result               Manual Differential[[id number]]          Abnormal            Final result                 Please view results for these tests on the individual orders.       Imaging Studies: None completed for today's visit      ASSESSMENT/PLAN:    #1 Breast cancer: Stage IIB invasive lobular carcinoma of the right breast diagnosed 4/2021 and currently undergoing therapy with Adriamycin and cyclophosphamide.  She will receive cycle 4 therapy today and follow-up prior to cycle 5 initiation of weekly Taxol with labs per treatment plan.         #2  Anemia:   Iron studies on 11/10/2021 were within normal limits with a slightly elevated ferritin.  Anemia is likely multifactorial      #3  Chemotherapy induced neutropenia:  Pegfilgrastim 6mg day 2.    #4 elevated ALT: Unsure of etiology.  She has been taking more Tylenol.  We will recheck in 2 weeks.    #5 right hip pain: We will obtain a right hip x-ray to rule out any fracture.     I encouraged patient to call with any questions or  concerns.      Valentina Avila, NP  APRN, FNP-BC, AOCNP

## 2021-12-08 NOTE — PATIENT INSTRUCTIONS
We will have you get an x-ray of your right hip prior to discharge.  We will call you with that result.    We would like to see you back per your schedule.     When you are in need of a refill please call your pharmacy and they will send us a request.     If you have any questions please call [phone]    Other instructions:  none

## 2021-12-08 NOTE — NURSING NOTE
"Oncology Rooming Note    December 8, 2021 10:18 AM   Anne Marie Nix is a 66 year old female who presents for:    Chief Complaint   Patient presents with     Oncology Clinic Visit     Follow up Malignant neoplasm of lower-outer quadrant of right breast of female, estrogen receptor positive      Initial Vitals: BP (!) 165/88   Pulse 93   Temp (!) 96.7  F (35.9  C) (Tympanic)   Resp 20   Ht 1.6 m (5' 3\")   Wt 54 kg (119 lb 0.8 oz)   SpO2 99%   BMI 21.09 kg/m   Estimated body mass index is 21.09 kg/m  as calculated from the following:    Height as of this encounter: 1.6 m (5' 3\").    Weight as of this encounter: 54 kg (119 lb 0.8 oz). Body surface area is 1.55 meters squared.  Mild Pain (3) Comment: Data Unavailable   No LMP recorded. Patient is postmenopausal.  Allergies reviewed: Yes  Medications reviewed: Yes    Medications: Medication refills not needed today.  Pharmacy name entered into [facility]:    [id number] - [place], [place] - [address]  [id number] - [place], [place] - [address] [facility] [address]  [facility][id number] - [place], [place] - [address] [facility] [address]  [id number] - [place], [place] - [address]          Gaby Drew LPN            "

## 2021-12-08 NOTE — PROGRESS NOTES
Patients power port accessed using non-coring, 19 gauge, 3/4 inch needle.Mask donned by caregiver: yes Site prepped with CHG: yes Labs drawn: yes Dressing applied using aseptic technique: yes.     Port accessed per facility protocol. Port flushed easily, blood return noted.  No signs and symptoms of infection or infiltration.  Port flushed with 10mL normal saline, blood return noted, 10 mLs blood discarded. 2 tube(s) taken for ordered labs, port flushed with 20 mLs normal saline.  Port left accessed as patient has appointment with Jeana Avila NP, and is then due for chemo if parameters are met.  Patient discharged with no complaints.

## 2021-12-08 NOTE — PROGRESS NOTES
Patient is 66 years old, here accompanied by self today for infusion of Adriamycin / Cytoxan under the orders of Dr. Rolle.     Component      Latest Ref Rng & Units 12/8/2021   Sodium      133 - 144 mmol/L 138   Potassium      3.4 - 5.3 mmol/L 3.9   Chloride      94 - 109 mmol/L 107   Carbon Dioxide      20 - 32 mmol/L 25   Anion Gap      3 - 14 mmol/L 6   Urea Nitrogen      7 - 30 mg/dL 15   Creatinine      0.52 - 1.04 mg/dL 0.58   Calcium      8.5 - 10.1 mg/dL 8.7   Glucose      70 - 99 mg/dL 93   Alkaline Phosphatase      40 - 150 U/L 132   AST      0 - 45 U/L 38   ALT      0 - 50 U/L 92 (H)   Protein Total      6.8 - 8.8 g/dL 6.6 (L)   Albumin      3.4 - 5.0 g/dL 3.2 (L)   Bilirubin Total      0.2 - 1.3 mg/dL 0.2   GFR Estimate      >60 mL/min/1.73m2 >90   % Neutrophils      % 67   % Lymphocytes      % 14   % Monocytes      % 6   % Eosinophils      % 0   % Basophils      % 2   % Metamyelocytes      % 7   % Myelocytes      % 4   Absolute Neutrophil      1.6 - 8.3 10e3/uL 10.0 (H)   Absolute Lymphocytes      0.8 - 5.3 10e3/uL 2.1   Absolute Monocytes      0.0 - 1.3 10e3/uL 0.9   Absolute Eosinophils      0.0 - 0.7 10e3/uL 0.0   Absolute Basophils      0.0 - 0.2 10e3/uL 0.3 (H)   Absolute Metamyelocytes      <=0.0 10e3/uL 1.0 (H)   Absolute Myelocytes      <=0.0 10e3/uL 0.6 (H)   RBC Morphology       Confirmed RBC Indices   Platelet Morphology      Automated Count Confirmed. Platelet morphology is normal. Automated Count Confirmed. Platelet morphology is normal.   WBC      4.0 - 11.0 10e3/uL 14.9 (H)   RBC Count      3.80 - 5.20 10e6/uL 3.17 (L)   Hemoglobin      11.7 - 15.7 g/dL 9.7 (L)   Hematocrit      35.0 - 47.0 % 30.3 (L)   MCV      78 - 100 fL 96   MCH      26.5 - 33.0 pg 30.6   MCHC      31.5 - 36.5 g/dL 32.0   RDW      10.0 - 15.0 % 18.0 (H)   Platelet Count      150 - 450 10e3/uL 305       Patient meets parameters for today's infusion.  Denies questions or concerns regarding today's infusion and/or  medications being administered.      Independent dose check completed with SHARRI Ordaz.    Patient identified with two identifiers, order verified, and verbal consent for today's infusion obtained from patient.     1204 IV push verified with Doxorubicin dose, drug, and rate of administration. Infusion administered per protocol. Patient tolerated infusion well, no signs or symptoms of adverse reaction noted. Patient denies pain nor discomfort.     1221 IV pump verified with Cyclophosphamide dose, drug, and rate of administration. Infusion administered per protocol. Patient tolerated infusion well, no signs or symptoms of adverse reaction noted. Patient denies pain nor discomfort.     Neulasta On-Pro placed to left upper posterior arm. Patient tolerated well. Given written and verbal education/information on medication. Lot number: [id number]    Needle removed, tip intact. Site clean, dry and intact. Covered with a sterile bandage, slight pressure applied for 30 seconds. Pt instructed to leave bandage intact for a minimum of one hour, and to call with questions or concerns. Patient states understanding, discharged.

## 2021-12-17 NOTE — PROGRESS NOTES
Oncology Follow-up Visit    Reason for Visit:  Anne Marie is a 66 year old woman with a diagnosis of right breast cancer, ER+, MD-, HER2 -, who presents to the clinic today for consideration of ongoing therapy. She is due today for her first of 12 weekly Taxol sessions (following 4 cycles of AC (C5 therapy overall)).     Nursing Note and documentation reviewed: Yes.    Interval History:   Anne Marie reports that she is doing well. She seems to be recovering well from her last cycle of Adriamycin. She does note that her right hip continues to be a little sore from a fall earlier this month, but notes that it continues to improve. She is no longer needing to take Tylenol. She denies any recent signs of infection. No fever, chills, chest pain, cough, or SOB. No bleeding concerns or issues. She denies nausea or vomiting. Appetite is good, great water intake. She notes that she continues to fast for 16 hours before and 20 hours after her infusions. Weight is stable. Bowels are regular. She was having issues with constipation but this has improved with her magnesium supplements. Denies skin rashes or skin concerns. Denies any neuropathy at baseline. Notes that energy levels are good. Staying active and busy, needing to rest occasionally but feeling well rested afterwards. Overall, doing well.     Oncologic History:      3/30/2021 Patient palpated a mass in the right breast  4/9/2021 Underwent mammogram showing an abnormality in the lower outer aspect of the right breast  4/13/2021 Underwent ultrasound-guided biopsy with pathology showing invasive lobular carcinoma, grade 2, ER positive, MD negative, HER-2/kevin negative  7/9/2021 Underwent bilateral breast MRI showing corresponding to the ultrasound finding in the right lower breast at about the 7 o'clock position an area of significant increased enhancement and mass extends for about 2.4 x 1.0 x 1.9 cm. This is larger than the ultrasound measurements.   8/23/2021 Underwent  lumpectomy with sentinel nodes by Dr. Sofy Cobb with [facility] and pathology showed a 3 cm, grade 2 invasive lobular carcinoma with inferior lateral margin positive for invasive carcinoma; 3 sentinel nodes obtained, 1 with micrometastasis measuring 2 mm and 1 with macrometastasis and she was staged [id number]  9/15/2021 Was seen by Dr. Nunez Friday with Medical Oncology and felt the patient had an early stage breast cancer, stage II with positive margins and he recommended completion mastectomy and felt she would not likely benefit from additional axillary lymph node surgery; Oncotype DX resulted at 53 and he recommended 4 cycles of dose dense AC followed by 12 weeks of Taxol  9/27/2021 Echocardiogram showed an EF of 65%  10/11/2021 Seen by Dr. Rolle with Medical Oncology at [facility] for a second opinion and he recommended a PET scan and MRI of the brain  10/11/2021 MRI of the brain was negative   10/13/2021 PET scan showed postop changes consistent with history of right breast lumpectomy and axillary node dissection and no evidence of metastatic disease  10/18/2021 Seen again by Dr. Rolle and Staged IIB with recommendation was for AC x4 cycles followed by weekly Taxol x12  10/19/2021 Port-A-Cath placement by Dr. Chen  10/27/2021 C1D1 Adriamycin/Cyclophosphamide  12/22/2021 Commenced weekly Taxol Dose 1/12      Genetics:  Referred/not completed     Current Chemo Regime/TX: Weekly Taxol 80mg/m2 x12 cycles   Current Cycle:  3  # of completed cycles:  2     Previous treatment:  Doxorubicin 60 mg/m2 & Cyclophosphamide 600 mg per/m2 given every 14 days with pegfilgrastim 6 mg subcutaneous day 2 x4 cycles    Past Medical History:   Diagnosis Date     Acne vulgaris      Cancer ([facility])      Chemotherapy-induced neutropenia ([facility]) 10/25/2021     Complication of anesthesia     nauseated     Premenstrual tension syndromes 10/14/2004       Past Surgical History:   Procedure Laterality Date     C OBSTETRICAL D&C        INSERT PORT VASCULAR ACCESS N/A 10/19/2021    Procedure: port-a-cath placement;  Surgeon: Reginaldo Chen MD;  Location: [facility]     LUMPECTOMY BREAST Right 08/23/2021       Family History   Problem Relation Age of Onset     Allergies Mother      Breast Cancer Mother      Diabetes Mother      Cardiovascular Paternal Grandfather      Allergies Paternal Grandfather      Angina Paternal Grandfather      Coronary Artery Disease Paternal Grandfather      Other - See Comments Father      Depression Maternal Grandmother      Cerebrovascular Disease Maternal Grandmother      Thyroid Disease Maternal Grandmother      Colon Cancer Maternal Grandfather      Diabetes Maternal Grandfather      Rheumatoid Arthritis Paternal Grandmother      Breast Cancer Cousin      Breast Cancer Maternal Aunt      Cancer Maternal Aunt         esphogeal, lung     Hypertension No family hx of      Hyperlipidemia No family hx of      Prostate Cancer No family hx of      Anesthesia Reaction No family hx of      Asthma No family hx of      Genetic Disorder No family hx of        Social History     Socioeconomic History     Marital status:      Spouse name: Not on file     Number of children: Not on file     Years of education: Not on file     Highest education level: Not on file   Occupational History     Not on file   Tobacco Use     Smoking status: Never Smoker     Smokeless tobacco: Never Used   Substance and Sexual Activity     Alcohol use: No     Drug use: No     Sexual activity: Not on file   Other Topics Concern     Parent/sibling w/ CABG, MI or angioplasty before 65F 55M? Yes   Social History Narrative     Not on file     Social Determinants of Health     Financial Resource Strain: Not on file   Food Insecurity: Not on file   Transportation Needs: Not on file   Physical Activity: Not on file   Stress: Not on file   Social Connections: Not on file   Intimate Partner Violence: Not on file   Housing Stability: Not on file  "      Current Outpatient Medications   Medication     Lysine 1000 MG TABS     methylphenidate (RITALIN) 10 MG tablet     NONFORMULARY     NONFORMULARY     NONFORMULARY     pseudoePHEDrine (SUDAFED) 30 MG tablet     Turmeric Curcumin 500 MG CAPS     vitamin B complex with vitamin C (VITAMIN  B COMPLEX) tablet     clindamycin-benzoyl peroxide (BENZACLIN) 1-5 % external gel     dapsone 5 % topical gel     mupirocin (BACTROBAN) 2 % external ointment     OLANZapine (ZYPREXA) 10 MG tablet     ondansetron (ZOFRAN) 8 MG tablet     prochlorperazine (COMPAZINE) 10 MG tablet     valACYclovir (VALTREX) 1000 mg tablet     No current facility-administered medications for this visit.        Allergies   Allergen Reactions     Vancomycin Other (See Comments)     Legs went numb- side effect     Amoxicillin Unknown     Headache  Stomach aches  Augmentin     Amoxicillin Trihydrate Other (See Comments)     Headache  Stomach aches  Augmentin         Clavulanic Acid Potassium Other (See Comments)     Headaches  Stomach aches  Augmentin       Levaquin [Levofloxacin] Other (See Comments)     Numbness in leg       Review Of Systems:  A complete review of systems is negative except for the above mentioned items in the interval history.     ECOG Performance Status: 0    Physical Exam:  /74   Pulse 86   Temp 97.2  F (36.2  C) (Tympanic)   Resp 20   Ht 1.6 m (5' 3\")   Wt 51.2 kg (112 lb 14 oz)   SpO2 99%   BMI 20.00 kg/m    GENERAL APPEARANCE: Healthy, alert and in no acute distress.  HEENT: Eyes appear normal without scleral icterus. Extraocular movements intact.  NECK:   Supple with normal range of motion. No asymmetry or masses.  LYMPHATICS: No palpable cervical, supraclavicular, or axillary nodes  RESP: Lungs clear to auscultation bilaterally, respirations regular and easy.  CARDIOVASCULAR: Regular rate and rhythm. Normal S1, S2; no murmur, gallop, or rub.  ABDOMEN: Soft, non-tender, non-distended. Bowel sounds auscultated all 4 " quadrants. No palpable organomegaly or masses.  MUSCULOSKELETAL: Extremities without gross deformities noted. No edema of bilateral lower extremities.  SKIN: No suspicious lesions or rashes.  NEURO: Alert and oriented x 3.  Gait steady.  PSYCHIATRIC: Mentation and affect appear normal.  Mood appropriate.    Laboratory:  Results for orders placed or performed in visit on 12/22/21   Comprehensive metabolic panel     Status: Abnormal   Result Value Ref Range    Sodium 141 133 - 144 mmol/L    Potassium 4.0 3.4 - 5.3 mmol/L    Chloride 111 (H) 94 - 109 mmol/L    Carbon Dioxide (CO2) 27 20 - 32 mmol/L    Anion Gap 3 3 - 14 mmol/L    Urea Nitrogen 13 7 - 30 mg/dL    Creatinine 0.60 0.52 - 1.04 mg/dL    Calcium 9.0 8.5 - 10.1 mg/dL    Glucose 93 70 - 99 mg/dL    Alkaline Phosphatase 98 40 - 150 U/L    AST 16 0 - 45 U/L    ALT 28 0 - 50 U/L    Protein Total 6.2 (L) 6.8 - 8.8 g/dL    Albumin 3.3 (L) 3.4 - 5.0 g/dL    Bilirubin Total 0.2 0.2 - 1.3 mg/dL    GFR Estimate >90 >60 mL/min/1.73m2   CBC with platelets and differential     Status: Abnormal   Result Value Ref Range    WBC Count 7.7 4.0 - 11.0 10e3/uL    RBC Count 3.00 (L) 3.80 - 5.20 10e6/uL    Hemoglobin 9.6 (L) 11.7 - 15.7 g/dL    Hematocrit 29.4 (L) 35.0 - 47.0 %    MCV 98 78 - 100 fL    MCH 32.0 26.5 - 33.0 pg    MCHC 32.7 31.5 - 36.5 g/dL    RDW 19.3 (H) 10.0 - 15.0 %    Platelet Count 188 150 - 450 10e3/uL   Manual Differential     Status: Abnormal   Result Value Ref Range    % Neutrophils 62 %    % Lymphocytes 14 %    % Monocytes 15 %    % Eosinophils 0 %    % Basophils 2 %    % Metamyelocytes 5 %    % Myelocytes 2 %    Absolute Neutrophils 4.8 1.6 - 8.3 10e3/uL    Absolute Lymphocytes 1.1 0.8 - 5.3 10e3/uL    Absolute Monocytes 1.2 0.0 - 1.3 10e3/uL    Absolute Eosinophils 0.0 0.0 - 0.7 10e3/uL    Absolute Basophils 0.2 0.0 - 0.2 10e3/uL    Absolute Metamyelocytes 0.4 (H) <=0.0 10e3/uL    Absolute Myelocytes 0.2 (H) <=0.0 10e3/uL    RBC Morphology Confirmed RBC  Indices     Platelet Assessment  Automated Count Confirmed. Platelet morphology is normal.     Automated Count Confirmed. Platelet morphology is normal.   CBC with platelets differential     Status: Abnormal    Narrative    The following orders were created for panel order CBC with platelets differential.  Procedure                               Abnormality         Status                     ---------                               -----------         ------                     CBC with platelets and d...[[id number]]  Abnormal            Final result               Manual Differential[[id number]]          Abnormal            Final result                 Please view results for these tests on the individual orders.       Imaging Studies:    PROCEDURE: XR HIP RIGHT 2-3 VIEWS 12/8/2021 1:59 PM     HISTORY: right hip injury/pain; Hip pain, right     COMPARISONS: None.     TECHNIQUE: 2 views.     FINDINGS: No acute fracture or dislocation is seen. Hip joint spaces  fairly well preserved. There is no focal bone lesion.       IMPRESSION: No acute bony abnormality.     JOANNA WAHL MD       ASSESSMENT/PLAN:  #1 Breast cancer: Stage IIB invasive lobular carcinoma of the right breast diagnosed 4/2021. Completed 4 cycles of Adriamycin and is now due to commence week 1/12 of Taxol. I educated her on potential side effects related to the Taxol. She agrees to proceed. We will plan on seeing her back prior to C8. That said, I asked that if she experiences any concerns or questions prior to then, she can either call or let the infusion nurses know.      #2  Anemia:   Iron studies on 11/10/2021 were within normal limits with a slightly elevated ferritin.  Anemia is likely multifactorial.     Patient in agreement with plan and verbalizes understanding. Agrees to call with any questions or concerns.    62 minutes spent in the patient's encounter today with time spent in review of patient's chart along with chart preparation and  review of the treatment plan and signing of treatment plan.  Time was also spent with the patient in obtaining a review of systems and performing a physical exam along with detailed review of all test results. Time was also spent in discussing plan for future follow-up and relating instructions for follow-up and in placing future orders.    DEEPA Blanchard, FNP-C  Medical Oncology

## 2021-12-22 ENCOUNTER — LAB (OUTPATIENT)
Dept: INFUSION THERAPY | Facility: OTHER | Age: 66
End: 2021-12-22
Attending: INTERNAL MEDICINE
Payer: MEDICARE

## 2021-12-22 ENCOUNTER — ONCOLOGY VISIT (OUTPATIENT)
Dept: ONCOLOGY | Facility: OTHER | Age: 66
End: 2021-12-22
Attending: NURSE PRACTITIONER
Payer: MEDICARE

## 2021-12-22 VITALS
WEIGHT: 112.88 LBS | HEIGHT: 63 IN | OXYGEN SATURATION: 99 % | RESPIRATION RATE: 20 BRPM | SYSTOLIC BLOOD PRESSURE: 122 MMHG | DIASTOLIC BLOOD PRESSURE: 74 MMHG | BODY MASS INDEX: 20 KG/M2 | TEMPERATURE: 97.2 F | HEART RATE: 86 BPM

## 2021-12-22 VITALS
SYSTOLIC BLOOD PRESSURE: 109 MMHG | HEART RATE: 83 BPM | TEMPERATURE: 98.6 F | DIASTOLIC BLOOD PRESSURE: 69 MMHG | RESPIRATION RATE: 17 BRPM | OXYGEN SATURATION: 98 %

## 2021-12-22 DIAGNOSIS — C50.511 MALIGNANT NEOPLASM OF LOWER-OUTER QUADRANT OF RIGHT BREAST OF FEMALE, ESTROGEN RECEPTOR POSITIVE (H): ICD-10-CM

## 2021-12-22 DIAGNOSIS — Z17.0 MALIGNANT NEOPLASM OF LOWER-OUTER QUADRANT OF RIGHT BREAST OF FEMALE, ESTROGEN RECEPTOR POSITIVE (H): Primary | ICD-10-CM

## 2021-12-22 DIAGNOSIS — T45.1X5A CHEMOTHERAPY-INDUCED NEUTROPENIA (H): ICD-10-CM

## 2021-12-22 DIAGNOSIS — D70.1 CHEMOTHERAPY-INDUCED NEUTROPENIA (H): ICD-10-CM

## 2021-12-22 DIAGNOSIS — Z17.0 MALIGNANT NEOPLASM OF LOWER-OUTER QUADRANT OF RIGHT BREAST OF FEMALE, ESTROGEN RECEPTOR POSITIVE (H): ICD-10-CM

## 2021-12-22 DIAGNOSIS — C50.511 MALIGNANT NEOPLASM OF LOWER-OUTER QUADRANT OF RIGHT BREAST OF FEMALE, ESTROGEN RECEPTOR POSITIVE (H): Primary | ICD-10-CM

## 2021-12-22 DIAGNOSIS — T45.1X5A CHEMOTHERAPY-INDUCED NEUTROPENIA (H): Primary | ICD-10-CM

## 2021-12-22 DIAGNOSIS — D70.1 CHEMOTHERAPY-INDUCED NEUTROPENIA (H): Primary | ICD-10-CM

## 2021-12-22 LAB
ALBUMIN SERPL-MCNC: 3.3 G/DL (ref 3.4–5)
ALP SERPL-CCNC: 98 U/L (ref 40–150)
ALT SERPL W P-5'-P-CCNC: 28 U/L (ref 0–50)
ANION GAP SERPL CALCULATED.3IONS-SCNC: 3 MMOL/L (ref 3–14)
AST SERPL W P-5'-P-CCNC: 16 U/L (ref 0–45)
BASOPHILS # BLD MANUAL: 0.2 10E3/UL (ref 0–0.2)
BASOPHILS NFR BLD MANUAL: 2 %
BILIRUB SERPL-MCNC: 0.2 MG/DL (ref 0.2–1.3)
BUN SERPL-MCNC: 13 MG/DL (ref 7–30)
CALCIUM SERPL-MCNC: 9 MG/DL (ref 8.5–10.1)
CANCER AG27-29 SERPL-ACNC: 59 U/ML (ref 0–39)
CHLORIDE BLD-SCNC: 111 MMOL/L (ref 94–109)
CO2 SERPL-SCNC: 27 MMOL/L (ref 20–32)
CREAT SERPL-MCNC: 0.6 MG/DL (ref 0.52–1.04)
EOSINOPHIL # BLD MANUAL: 0 10E3/UL (ref 0–0.7)
EOSINOPHIL NFR BLD MANUAL: 0 %
ERYTHROCYTE [DISTWIDTH] IN BLOOD BY AUTOMATED COUNT: 19.3 % (ref 10–15)
GFR SERPL CREATININE-BSD FRML MDRD: >90 ML/MIN/1.73M2
GLUCOSE BLD-MCNC: 93 MG/DL (ref 70–99)
HCT VFR BLD AUTO: 29.4 % (ref 35–47)
HGB BLD-MCNC: 9.6 G/DL (ref 11.7–15.7)
LYMPHOCYTES # BLD MANUAL: 1.1 10E3/UL (ref 0.8–5.3)
LYMPHOCYTES NFR BLD MANUAL: 14 %
MCH RBC QN AUTO: 32 PG (ref 26.5–33)
MCHC RBC AUTO-ENTMCNC: 32.7 G/DL (ref 31.5–36.5)
MCV RBC AUTO: 98 FL (ref 78–100)
METAMYELOCYTES # BLD MANUAL: 0.4 10E3/UL
METAMYELOCYTES NFR BLD MANUAL: 5 %
MONOCYTES # BLD MANUAL: 1.2 10E3/UL (ref 0–1.3)
MONOCYTES NFR BLD MANUAL: 15 %
MYELOCYTES # BLD MANUAL: 0.2 10E3/UL
MYELOCYTES NFR BLD MANUAL: 2 %
NEUTROPHILS # BLD MANUAL: 4.8 10E3/UL (ref 1.6–8.3)
NEUTROPHILS NFR BLD MANUAL: 62 %
PLAT MORPH BLD: ABNORMAL
PLATELET # BLD AUTO: 188 10E3/UL (ref 150–450)
POTASSIUM BLD-SCNC: 4 MMOL/L (ref 3.4–5.3)
PROT SERPL-MCNC: 6.2 G/DL (ref 6.8–8.8)
RBC # BLD AUTO: 3 10E6/UL (ref 3.8–5.2)
RBC MORPH BLD: ABNORMAL
SODIUM SERPL-SCNC: 141 MMOL/L (ref 133–144)
WBC # BLD AUTO: 7.7 10E3/UL (ref 4–11)

## 2021-12-22 PROCEDURE — 99215 OFFICE O/P EST HI 40 MIN: CPT | Performed by: NURSE PRACTITIONER

## 2021-12-22 PROCEDURE — 86300 IMMUNOASSAY TUMOR CA 15-3: CPT | Mod: ZL

## 2021-12-22 PROCEDURE — G0463 HOSPITAL OUTPT CLINIC VISIT: HCPCS

## 2021-12-22 PROCEDURE — 96409 CHEMO IV PUSH SNGL DRUG: CPT

## 2021-12-22 PROCEDURE — 36591 DRAW BLOOD OFF VENOUS DEVICE: CPT | Mod: ZL

## 2021-12-22 PROCEDURE — 80053 COMPREHEN METABOLIC PANEL: CPT | Mod: ZL

## 2021-12-22 PROCEDURE — 96367 TX/PROPH/DG ADDL SEQ IV INF: CPT

## 2021-12-22 PROCEDURE — G0463 HOSPITAL OUTPT CLINIC VISIT: HCPCS | Mod: 25

## 2021-12-22 PROCEDURE — 96523 IRRIG DRUG DELIVERY DEVICE: CPT

## 2021-12-22 PROCEDURE — 250N000011 HC RX IP 250 OP 636: Performed by: NURSE PRACTITIONER

## 2021-12-22 PROCEDURE — 85027 COMPLETE CBC AUTOMATED: CPT | Mod: ZL | Performed by: NURSE PRACTITIONER

## 2021-12-22 PROCEDURE — 258N000003 HC RX IP 258 OP 636: Performed by: NURSE PRACTITIONER

## 2021-12-22 RX ORDER — EPINEPHRINE 1 MG/ML
0.3 INJECTION, SOLUTION, CONCENTRATE INTRAVENOUS EVERY 5 MIN PRN
Status: CANCELLED | OUTPATIENT
Start: 2021-12-29

## 2021-12-22 RX ORDER — HEPARIN SODIUM,PORCINE 10 UNIT/ML
5 VIAL (ML) INTRAVENOUS
Status: CANCELLED | OUTPATIENT
Start: 2022-01-19

## 2021-12-22 RX ORDER — ALBUTEROL SULFATE 0.83 MG/ML
2.5 SOLUTION RESPIRATORY (INHALATION)
Status: CANCELLED | OUTPATIENT
Start: 2021-12-29

## 2021-12-22 RX ORDER — MEPERIDINE HYDROCHLORIDE 25 MG/ML
25 INJECTION INTRAMUSCULAR; INTRAVENOUS; SUBCUTANEOUS EVERY 30 MIN PRN
Status: CANCELLED | OUTPATIENT
Start: 2021-12-29

## 2021-12-22 RX ORDER — DIPHENHYDRAMINE HCL 50 MG
50 CAPSULE ORAL
Status: CANCELLED
Start: 2022-01-05

## 2021-12-22 RX ORDER — HEPARIN SODIUM,PORCINE 10 UNIT/ML
5 VIAL (ML) INTRAVENOUS
Status: CANCELLED | OUTPATIENT
Start: 2021-12-22

## 2021-12-22 RX ORDER — LORAZEPAM 2 MG/ML
0.5 INJECTION INTRAMUSCULAR EVERY 4 HOURS PRN
Status: CANCELLED | OUTPATIENT
Start: 2022-01-05

## 2021-12-22 RX ORDER — HEPARIN SODIUM (PORCINE) LOCK FLUSH IV SOLN 100 UNIT/ML 100 UNIT/ML
5 SOLUTION INTRAVENOUS
Status: CANCELLED | OUTPATIENT
Start: 2021-12-29

## 2021-12-22 RX ORDER — METHYLPREDNISOLONE SODIUM SUCCINATE 125 MG/2ML
125 INJECTION, POWDER, LYOPHILIZED, FOR SOLUTION INTRAMUSCULAR; INTRAVENOUS
Status: CANCELLED
Start: 2021-12-22

## 2021-12-22 RX ORDER — ALBUTEROL SULFATE 90 UG/1
1-2 AEROSOL, METERED RESPIRATORY (INHALATION)
Status: CANCELLED
Start: 2022-01-19

## 2021-12-22 RX ORDER — DIPHENHYDRAMINE HYDROCHLORIDE 50 MG/ML
50 INJECTION INTRAMUSCULAR; INTRAVENOUS
Status: CANCELLED
Start: 2021-12-22

## 2021-12-22 RX ORDER — LORAZEPAM 2 MG/ML
0.5 INJECTION INTRAMUSCULAR EVERY 4 HOURS PRN
Status: CANCELLED | OUTPATIENT
Start: 2021-12-22

## 2021-12-22 RX ORDER — HEPARIN SODIUM (PORCINE) LOCK FLUSH IV SOLN 100 UNIT/ML 100 UNIT/ML
5 SOLUTION INTRAVENOUS
Status: CANCELLED | OUTPATIENT
Start: 2022-01-05

## 2021-12-22 RX ORDER — ALBUTEROL SULFATE 0.83 MG/ML
2.5 SOLUTION RESPIRATORY (INHALATION)
Status: CANCELLED | OUTPATIENT
Start: 2022-01-19

## 2021-12-22 RX ORDER — DIPHENHYDRAMINE HCL 50 MG
50 CAPSULE ORAL ONCE
Status: CANCELLED
Start: 2021-12-29

## 2021-12-22 RX ORDER — ALBUTEROL SULFATE 0.83 MG/ML
2.5 SOLUTION RESPIRATORY (INHALATION)
Status: CANCELLED | OUTPATIENT
Start: 2021-12-22

## 2021-12-22 RX ORDER — LORAZEPAM 2 MG/ML
0.5 INJECTION INTRAMUSCULAR EVERY 4 HOURS PRN
Status: CANCELLED | OUTPATIENT
Start: 2021-12-29

## 2021-12-22 RX ORDER — NALOXONE HYDROCHLORIDE 0.4 MG/ML
0.2 INJECTION, SOLUTION INTRAMUSCULAR; INTRAVENOUS; SUBCUTANEOUS
Status: CANCELLED | OUTPATIENT
Start: 2022-01-05

## 2021-12-22 RX ORDER — HEPARIN SODIUM,PORCINE 10 UNIT/ML
5 VIAL (ML) INTRAVENOUS
Status: CANCELLED | OUTPATIENT
Start: 2021-12-29

## 2021-12-22 RX ORDER — MEPERIDINE HYDROCHLORIDE 25 MG/ML
25 INJECTION INTRAMUSCULAR; INTRAVENOUS; SUBCUTANEOUS EVERY 30 MIN PRN
Status: CANCELLED | OUTPATIENT
Start: 2022-01-05

## 2021-12-22 RX ORDER — MEPERIDINE HYDROCHLORIDE 25 MG/ML
25 INJECTION INTRAMUSCULAR; INTRAVENOUS; SUBCUTANEOUS EVERY 30 MIN PRN
Status: CANCELLED | OUTPATIENT
Start: 2022-01-19

## 2021-12-22 RX ORDER — DIPHENHYDRAMINE HYDROCHLORIDE 50 MG/ML
50 INJECTION INTRAMUSCULAR; INTRAVENOUS
Status: CANCELLED
Start: 2022-01-19

## 2021-12-22 RX ORDER — EPINEPHRINE 1 MG/ML
0.3 INJECTION, SOLUTION, CONCENTRATE INTRAVENOUS EVERY 5 MIN PRN
Status: CANCELLED | OUTPATIENT
Start: 2021-12-22

## 2021-12-22 RX ORDER — NALOXONE HYDROCHLORIDE 0.4 MG/ML
0.2 INJECTION, SOLUTION INTRAMUSCULAR; INTRAVENOUS; SUBCUTANEOUS
Status: CANCELLED | OUTPATIENT
Start: 2022-01-19

## 2021-12-22 RX ORDER — ALBUTEROL SULFATE 90 UG/1
1-2 AEROSOL, METERED RESPIRATORY (INHALATION)
Status: CANCELLED
Start: 2022-01-05

## 2021-12-22 RX ORDER — MEPERIDINE HYDROCHLORIDE 25 MG/ML
25 INJECTION INTRAMUSCULAR; INTRAVENOUS; SUBCUTANEOUS EVERY 30 MIN PRN
Status: CANCELLED | OUTPATIENT
Start: 2021-12-22

## 2021-12-22 RX ORDER — EPINEPHRINE 1 MG/ML
0.3 INJECTION, SOLUTION, CONCENTRATE INTRAVENOUS EVERY 5 MIN PRN
Status: CANCELLED | OUTPATIENT
Start: 2022-01-19

## 2021-12-22 RX ORDER — HEPARIN SODIUM (PORCINE) LOCK FLUSH IV SOLN 100 UNIT/ML 100 UNIT/ML
5 SOLUTION INTRAVENOUS
Status: DISCONTINUED | OUTPATIENT
Start: 2021-12-22 | End: 2021-12-22 | Stop reason: HOSPADM

## 2021-12-22 RX ORDER — HEPARIN SODIUM,PORCINE 10 UNIT/ML
5 VIAL (ML) INTRAVENOUS
Status: CANCELLED | OUTPATIENT
Start: 2022-01-05

## 2021-12-22 RX ORDER — DIPHENHYDRAMINE HCL 50 MG
50 CAPSULE ORAL ONCE
Status: CANCELLED
Start: 2021-12-22

## 2021-12-22 RX ORDER — ALBUTEROL SULFATE 90 UG/1
1-2 AEROSOL, METERED RESPIRATORY (INHALATION)
Status: CANCELLED
Start: 2021-12-22

## 2021-12-22 RX ORDER — DIPHENHYDRAMINE HYDROCHLORIDE 50 MG/ML
50 INJECTION INTRAMUSCULAR; INTRAVENOUS
Status: CANCELLED
Start: 2021-12-29

## 2021-12-22 RX ORDER — DIPHENHYDRAMINE HCL 50 MG
50 CAPSULE ORAL
Status: CANCELLED
Start: 2022-01-19

## 2021-12-22 RX ORDER — HEPARIN SODIUM (PORCINE) LOCK FLUSH IV SOLN 100 UNIT/ML 100 UNIT/ML
5 SOLUTION INTRAVENOUS
Status: CANCELLED | OUTPATIENT
Start: 2021-12-22

## 2021-12-22 RX ORDER — NALOXONE HYDROCHLORIDE 0.4 MG/ML
0.2 INJECTION, SOLUTION INTRAMUSCULAR; INTRAVENOUS; SUBCUTANEOUS
Status: CANCELLED | OUTPATIENT
Start: 2021-12-29

## 2021-12-22 RX ORDER — DIPHENHYDRAMINE HYDROCHLORIDE 50 MG/ML
50 INJECTION INTRAMUSCULAR; INTRAVENOUS
Status: CANCELLED
Start: 2022-01-05

## 2021-12-22 RX ORDER — EPINEPHRINE 1 MG/ML
0.3 INJECTION, SOLUTION, CONCENTRATE INTRAVENOUS EVERY 5 MIN PRN
Status: CANCELLED | OUTPATIENT
Start: 2022-01-05

## 2021-12-22 RX ORDER — ALBUTEROL SULFATE 0.83 MG/ML
2.5 SOLUTION RESPIRATORY (INHALATION)
Status: CANCELLED | OUTPATIENT
Start: 2022-01-05

## 2021-12-22 RX ORDER — NALOXONE HYDROCHLORIDE 0.4 MG/ML
0.2 INJECTION, SOLUTION INTRAMUSCULAR; INTRAVENOUS; SUBCUTANEOUS
Status: CANCELLED | OUTPATIENT
Start: 2021-12-22

## 2021-12-22 RX ORDER — METHYLPREDNISOLONE SODIUM SUCCINATE 125 MG/2ML
125 INJECTION, POWDER, LYOPHILIZED, FOR SOLUTION INTRAMUSCULAR; INTRAVENOUS
Status: CANCELLED
Start: 2022-01-19

## 2021-12-22 RX ORDER — METHYLPREDNISOLONE SODIUM SUCCINATE 125 MG/2ML
125 INJECTION, POWDER, LYOPHILIZED, FOR SOLUTION INTRAMUSCULAR; INTRAVENOUS
Status: CANCELLED
Start: 2022-01-05

## 2021-12-22 RX ORDER — HEPARIN SODIUM (PORCINE) LOCK FLUSH IV SOLN 100 UNIT/ML 100 UNIT/ML
5 SOLUTION INTRAVENOUS
Status: CANCELLED | OUTPATIENT
Start: 2022-01-19

## 2021-12-22 RX ORDER — LORAZEPAM 2 MG/ML
0.5 INJECTION INTRAMUSCULAR EVERY 4 HOURS PRN
Status: CANCELLED | OUTPATIENT
Start: 2022-01-19

## 2021-12-22 RX ORDER — METHYLPREDNISOLONE SODIUM SUCCINATE 125 MG/2ML
125 INJECTION, POWDER, LYOPHILIZED, FOR SOLUTION INTRAMUSCULAR; INTRAVENOUS
Status: CANCELLED
Start: 2021-12-29

## 2021-12-22 RX ORDER — ALBUTEROL SULFATE 90 UG/1
1-2 AEROSOL, METERED RESPIRATORY (INHALATION)
Status: CANCELLED
Start: 2021-12-29

## 2021-12-22 RX ADMIN — DEXAMETHASONE SODIUM PHOSPHATE 20 MG: 10 INJECTION INTRAMUSCULAR; INTRAVENOUS at 10:21

## 2021-12-22 RX ADMIN — PACLITAXEL 123 MG: 6 INJECTION, SOLUTION INTRAVENOUS at 11:30

## 2021-12-22 RX ADMIN — SODIUM CHLORIDE 250 ML: 9 INJECTION, SOLUTION INTRAVENOUS at 09:59

## 2021-12-22 RX ADMIN — FAMOTIDINE 20 MG: 20 INJECTION, SOLUTION INTRAVENOUS at 09:54

## 2021-12-22 RX ADMIN — DIPHENHYDRAMINE HYDROCHLORIDE 50 MG: 50 INJECTION INTRAMUSCULAR; INTRAVENOUS at 10:43

## 2021-12-22 RX ADMIN — Medication 5 ML: at 12:33

## 2021-12-22 ASSESSMENT — PAIN SCALES - GENERAL
PAINLEVEL: NO PAIN (0)
PAINLEVEL: NO PAIN (0)

## 2021-12-22 ASSESSMENT — MIFFLIN-ST. JEOR: SCORE: 1026.13

## 2021-12-22 NOTE — PROGRESS NOTES
Patients power port accessed using non-coring, 19 gauge, 3/4 inch needle.Mask donned by caregiver: yes Site prepped with CHG: yes Labs drawn: yes Dressing applied using aseptic technique: yes.     Port accessed per facility protocol. Port flushed easily, blood return noted.  No signs and symptoms of infection or infiltration.  Port flushed with 10mL normal saline, blood return noted, 10 mLs blood discarded. Blood taken for ordered labs, port flushed with 20 mLs normal saline.  Port left accessed as patient has appointment with MED ONC, and is then due for chemo if parameters are met.  Patient discharged with no complaints.

## 2021-12-22 NOTE — PATIENT INSTRUCTIONS
Thank you for allowing me to be a part of your care today.    We would like to see you back as per schedule. We will plan on completing lab work prior.    If you have any questions please call [phone]    Other instructions:      None

## 2021-12-22 NOTE — PROGRESS NOTES
Patient is 66 years old, here accompanied by self today for infusion of Taxol under the orders of Dr. Rolle.      Patient  lab values: reviewed and are in normal limits.  Patient meets parameters for today's infusion.  Denies questions or concerns regarding today's infusion and/or medications being administered.      Independent dose check completed with Orlin HUNAG RN.    Patient identified with two identifiers, order verified, and verbal consent for today's infusion obtained from patient.     1130 IV pump verified with Taxol dose, drug, and rate of administration. Infusion administered per protocol. Patient tolerated infusion well, no signs or symptoms of adverse reaction noted. Patient denies pain nor discomfort.     Needle removed, tip intact. Site clean, dry and intact. Covered with a sterile bandage, slight pressure applied for 30 seconds. Pt instructed to leave bandage intact for a minimum of one hour, and to call with questions or concerns. Copy of appointments, discharge instructions, and after visit summary (AVS) provided to patient. Patient states understanding, discharged.

## 2021-12-22 NOTE — NURSING NOTE
"Oncology Rooming Note    December 22, 2021 8:32 AM   Anne Marie Nix is a 66 year old female who presents for:    Chief Complaint   Patient presents with     Oncology Clinic Visit     Follow up Malignant neoplasm of lower-outer quadrant of right breast of female, estrogen receptor positive      Initial Vitals: /74   Pulse 86   Temp 97.2  F (36.2  C) (Tympanic)   Resp 20   Ht 1.6 m (5' 3\")   Wt 51.2 kg (112 lb 14 oz)   SpO2 99%   BMI 20.00 kg/m   Estimated body mass index is 20 kg/m  as calculated from the following:    Height as of this encounter: 1.6 m (5' 3\").    Weight as of this encounter: 51.2 kg (112 lb 14 oz). Body surface area is 1.51 meters squared.  No Pain (0) Comment: Data Unavailable   No LMP recorded. Patient is postmenopausal.  Allergies reviewed: Yes  Medications reviewed: Yes    Medications: Medication refills not needed today.  Pharmacy name entered into [facility]:    "[facility] ([id number] - [place], [place] - [address]  [address]  "[id number] - [place] - [address]  "[email or web address][id number] - [place], [place] - [address] [facility] [address]  [facility] - [place], [place] - [address]          Gaby Drew LPN            "

## 2021-12-22 NOTE — PATIENT INSTRUCTIONS
Patient Education     Paclitaxel Solution for injection  What is this medicine?  PACLITAXEL (JENIFER li TAX el) is a chemotherapy drug. It targets fast dividing cells, like cancer cells, and causes these cells to die. This medicine is used to treat ovarian cancer, breast cancer, and other cancers.  This medicine may be used for other purposes; ask your health care provider or pharmacist if you have questions.  What should I tell my health care provider before I take this medicine?  They need to know if you have any of these conditions:    blood disorders    irregular heartbeat    infection (especially a virus infection such as chickenpox, cold sores, or herpes)    liver disease    previous or ongoing radiation therapy    an unusual or allergic reaction to paclitaxel, alcohol, polyoxyethylated castor oil, other chemotherapy agents, other medicines, foods, dyes, or preservatives    pregnant or trying to get pregnant    breast-feeding  How should I use this medicine?  This drug is given as an infusion into a vein. It is administered in a hospital or clinic by a specially trained health care professional.  Talk to your pediatrician regarding the use of this medicine in children. Special care may be needed.  Overdosage: If you think you have taken too much of this medicine contact a poison control center or emergency room at once.  NOTE: This medicine is only for you. Do not share this medicine with others.  What if I miss a dose?  It is important not to miss your dose. Call your doctor or health care professional if you are unable to keep an appointment.  What may interact with this medicine?  Do not take this medicine with any of the following medications:    disulfiram    metronidazole  This medicine may also interact with the following medications:    cyclosporine    diazepam    ketoconazole    medicines to increase blood counts like filgrastim, pegfilgrastim, sargramostim    other chemotherapy drugs like cisplatin,  doxorubicin, epirubicin, etoposide, teniposide, vincristine    quinidine    testosterone    vaccines    verapamil  Talk to your doctor or health care professional before taking any of these medicines:    acetaminophen    aspirin    ibuprofen    ketoprofen    naproxen  This list may not describe all possible interactions. Give your health care provider a list of all the medicines, herbs, non-prescription drugs, or dietary supplements you use. Also tell them if you smoke, drink alcohol, or use illegal drugs. Some items may interact with your medicine.  What should I watch for while using this medicine?  Your condition will be monitored carefully while you are receiving this medicine. You will need important blood work done while you are taking this medicine.  This drug may make you feel generally unwell. This is not uncommon, as chemotherapy can affect healthy cells as well as cancer cells. Report any side effects. Continue your course of treatment even though you feel ill unless your doctor tells you to stop.  In some cases, you may be given additional medicines to help with side effects. Follow all directions for their use.  Call your doctor or health care professional for advice if you get a fever, chills or sore throat, or other symptoms of a cold or flu. Do not treat yourself. This drug decreases your body's ability to fight infections. Try to avoid being around people who are sick.  This medicine may increase your risk to bruise or bleed. Call your doctor or health care professional if you notice any unusual bleeding.  Be careful brushing and flossing your teeth or using a toothpick because you may get an infection or bleed more easily. If you have any dental work done, tell your dentist you are receiving this medicine.  Avoid taking products that contain aspirin, acetaminophen, ibuprofen, naproxen, or ketoprofen unless instructed by your doctor. These medicines may hide a fever.  Do not become pregnant while  taking this medicine. Women should inform their doctor if they wish to become pregnant or think they might be pregnant. There is a potential for serious side effects to an unborn child. Talk to your health care professional or pharmacist for more information. Do not breast-feed an infant while taking this medicine.  Men are advised not to father a child while receiving this medicine.  What side effects may I notice from receiving this medicine?  Side effects that you should report to your doctor or health care professional as soon as possible:    allergic reactions like skin rash, itching or hives, swelling of the face, lips, or tongue    low blood counts - This drug may decrease the number of white blood cells, red blood cells and platelets. You may be at increased risk for infections and bleeding.    signs of infection - fever or chills, cough, sore throat, pain or difficulty passing urine    signs of decreased platelets or bleeding - bruising, pinpoint red spots on the skin, black, tarry stools, nosebleeds    signs of decreased red blood cells - unusually weak or tired, fainting spells, lightheadedness    breathing problems    chest pain    high or low blood pressure    mouth sores    nausea and vomiting    pain, swelling, redness or irritation at the injection site    pain, tingling, numbness in the hands or feet    slow or irregular heartbeat    swelling of the ankle, feet, hands  Side effects that usually do not require medical attention (report to your doctor or health care professional if they continue or are bothersome):    bone pain    complete hair loss including hair on your head, underarms, pubic hair, eyebrows, and eyelashes    changes in the color of fingernails    diarrhea    loosening of the fingernails    loss of appetite    muscle or joint pain    red flush to skin    sweating  This list may not describe all possible side effects. Call your doctor for medical advice about side effects. You may  report side effects to FDA at 0-808-FDA-7301.  Where should I keep my medicine?  This drug is given in a hospital or clinic and will not be stored at home.  NOTE:This sheet is a summary. It may not cover all possible information. If you have questions about this medicine, talk to your doctor, pharmacist, or health care provider. Copyright  2016 Gold Standard

## 2021-12-29 ENCOUNTER — INFUSION THERAPY VISIT (OUTPATIENT)
Dept: INFUSION THERAPY | Facility: OTHER | Age: 66
End: 2021-12-29
Attending: INTERNAL MEDICINE
Payer: MEDICARE

## 2021-12-29 VITALS
SYSTOLIC BLOOD PRESSURE: 113 MMHG | BODY MASS INDEX: 20.39 KG/M2 | TEMPERATURE: 97.6 F | HEART RATE: 80 BPM | RESPIRATION RATE: 17 BRPM | OXYGEN SATURATION: 95 % | DIASTOLIC BLOOD PRESSURE: 71 MMHG | WEIGHT: 115.08 LBS

## 2021-12-29 DIAGNOSIS — Z17.0 MALIGNANT NEOPLASM OF LOWER-OUTER QUADRANT OF RIGHT BREAST OF FEMALE, ESTROGEN RECEPTOR POSITIVE (H): ICD-10-CM

## 2021-12-29 DIAGNOSIS — C50.511 MALIGNANT NEOPLASM OF LOWER-OUTER QUADRANT OF RIGHT BREAST OF FEMALE, ESTROGEN RECEPTOR POSITIVE (H): ICD-10-CM

## 2021-12-29 DIAGNOSIS — T45.1X5A CHEMOTHERAPY-INDUCED NEUTROPENIA (H): Primary | ICD-10-CM

## 2021-12-29 DIAGNOSIS — D70.1 CHEMOTHERAPY-INDUCED NEUTROPENIA (H): Primary | ICD-10-CM

## 2021-12-29 LAB
ALBUMIN SERPL-MCNC: 3.2 G/DL (ref 3.4–5)
ALP SERPL-CCNC: 62 U/L (ref 40–150)
ALT SERPL W P-5'-P-CCNC: 37 U/L (ref 0–50)
ANION GAP SERPL CALCULATED.3IONS-SCNC: 5 MMOL/L (ref 3–14)
AST SERPL W P-5'-P-CCNC: 22 U/L (ref 0–45)
BASOPHILS # BLD AUTO: 0.1 10E3/UL (ref 0–0.2)
BASOPHILS NFR BLD AUTO: 3 %
BILIRUB SERPL-MCNC: 0.4 MG/DL (ref 0.2–1.3)
BUN SERPL-MCNC: 15 MG/DL (ref 7–30)
CALCIUM SERPL-MCNC: 9 MG/DL (ref 8.5–10.1)
CHLORIDE BLD-SCNC: 107 MMOL/L (ref 94–109)
CO2 SERPL-SCNC: 28 MMOL/L (ref 20–32)
CREAT SERPL-MCNC: 0.52 MG/DL (ref 0.52–1.04)
EOSINOPHIL # BLD AUTO: 0 10E3/UL (ref 0–0.7)
EOSINOPHIL NFR BLD AUTO: 1 %
ERYTHROCYTE [DISTWIDTH] IN BLOOD BY AUTOMATED COUNT: 18.9 % (ref 10–15)
GFR SERPL CREATININE-BSD FRML MDRD: >90 ML/MIN/1.73M2
GLUCOSE BLD-MCNC: 93 MG/DL (ref 70–99)
HCT VFR BLD AUTO: 27.8 % (ref 35–47)
HGB BLD-MCNC: 8.9 G/DL (ref 11.7–15.7)
IMM GRANULOCYTES # BLD: 0 10E3/UL
IMM GRANULOCYTES NFR BLD: 1 %
LYMPHOCYTES # BLD AUTO: 0.6 10E3/UL (ref 0.8–5.3)
LYMPHOCYTES NFR BLD AUTO: 20 %
MCH RBC QN AUTO: 31.6 PG (ref 26.5–33)
MCHC RBC AUTO-ENTMCNC: 32 G/DL (ref 31.5–36.5)
MCV RBC AUTO: 99 FL (ref 78–100)
MONOCYTES # BLD AUTO: 0.7 10E3/UL (ref 0–1.3)
MONOCYTES NFR BLD AUTO: 22 %
NEUTROPHILS # BLD AUTO: 1.6 10E3/UL (ref 1.6–8.3)
NEUTROPHILS NFR BLD AUTO: 53 %
NRBC # BLD AUTO: 0 10E3/UL
NRBC BLD AUTO-RTO: 0 /100
PLAT MORPH BLD: NORMAL
PLATELET # BLD AUTO: 291 10E3/UL (ref 150–450)
POTASSIUM BLD-SCNC: 4.1 MMOL/L (ref 3.4–5.3)
PROT SERPL-MCNC: 5.9 G/DL (ref 6.8–8.8)
RBC # BLD AUTO: 2.82 10E6/UL (ref 3.8–5.2)
RBC MORPH BLD: NORMAL
SODIUM SERPL-SCNC: 140 MMOL/L (ref 133–144)
WBC # BLD AUTO: 3 10E3/UL (ref 4–11)

## 2021-12-29 PROCEDURE — 96413 CHEMO IV INFUSION 1 HR: CPT

## 2021-12-29 PROCEDURE — 96365 THER/PROPH/DIAG IV INF INIT: CPT

## 2021-12-29 PROCEDURE — 258N000003 HC RX IP 258 OP 636: Performed by: NURSE PRACTITIONER

## 2021-12-29 PROCEDURE — 250N000011 HC RX IP 250 OP 636: Performed by: NURSE PRACTITIONER

## 2021-12-29 PROCEDURE — 80053 COMPREHEN METABOLIC PANEL: CPT | Mod: ZL

## 2021-12-29 PROCEDURE — 36591 DRAW BLOOD OFF VENOUS DEVICE: CPT | Mod: ZL

## 2021-12-29 PROCEDURE — 96367 TX/PROPH/DG ADDL SEQ IV INF: CPT

## 2021-12-29 PROCEDURE — 85004 AUTOMATED DIFF WBC COUNT: CPT | Mod: ZL | Performed by: NURSE PRACTITIONER

## 2021-12-29 RX ORDER — HEPARIN SODIUM (PORCINE) LOCK FLUSH IV SOLN 100 UNIT/ML 100 UNIT/ML
5 SOLUTION INTRAVENOUS
Status: DISCONTINUED | OUTPATIENT
Start: 2021-12-29 | End: 2021-12-30 | Stop reason: HOSPADM

## 2021-12-29 RX ADMIN — FAMOTIDINE 20 MG: 20 INJECTION, SOLUTION INTRAVENOUS at 10:57

## 2021-12-29 RX ADMIN — HEPARIN 5 ML: 100 SYRINGE at 12:33

## 2021-12-29 RX ADMIN — DIPHENHYDRAMINE HYDROCHLORIDE 50 MG: 50 INJECTION INTRAMUSCULAR; INTRAVENOUS at 10:37

## 2021-12-29 RX ADMIN — DEXAMETHASONE SODIUM PHOSPHATE 20 MG: 10 INJECTION INTRAMUSCULAR; INTRAVENOUS at 10:15

## 2021-12-29 RX ADMIN — PACLITAXEL 123 MG: 6 INJECTION, SOLUTION INTRAVENOUS at 11:26

## 2021-12-29 RX ADMIN — SODIUM CHLORIDE 250 ML: 9 INJECTION, SOLUTION INTRAVENOUS at 10:15

## 2021-12-29 ASSESSMENT — PAIN SCALES - GENERAL: PAINLEVEL: NO PAIN (0)

## 2021-12-29 NOTE — PROGRESS NOTES
Patient is 66 years old, here accompanied by self today for infusion of Taxol under the orders of Dr. Rolle.  Power port accessed with 19 gauge 3/4 inch non-coring needle.  Line flushed with 10 cc's normal saline.  Needle secured with sterile transparent dressing.  10 cc's blood discarded, and blood taken for 2 tubes of ordered labs.  Patient tolerated well.  Denies pain and discomfort at this time.  Port flushes easily without resistance.    Hand hygiene performed: yes   Mask donned by caregiver: yes Site prepped with CHG: yes Labs drawn: yes Dressing applied using aseptic technique: yes     Patient  lab values:  Reviewed and are with in normal limits for treatment.    Patient meets parameters for today's infusion.  Denies questions or concerns regarding today's infusion and/or medications being administered.      Independent dose check completed with Orlin HUANG RN.    Patient identified with two identifiers, order verified, and verbal consent for today's infusion obtained from patient.     1126 IV pump verified with Taxol  dose, drug, and rate of administration. Infusion administered per protocol. Patient tolerated infusion well, no signs or symptoms of adverse reaction noted. Patient denies pain nor discomfort.     Needle removed, tip intact. Site clean, dry and intact. Covered with a sterile bandage, slight pressure applied for 30 seconds. Pt instructed to leave bandage intact for a minimum of one hour, and to call with questions or concerns. Copy of appointments, discharge instructions, and after visit summary (AVS) provided to patient. Patient states understanding, discharged.

## 2021-12-31 ENCOUNTER — MYC MEDICAL ADVICE (OUTPATIENT)
Dept: FAMILY MEDICINE | Facility: OTHER | Age: 66
End: 2021-12-31
Payer: MEDICARE

## 2022-01-03 DIAGNOSIS — F90.0 ATTENTION DEFICIT HYPERACTIVITY DISORDER (ADHD), PREDOMINANTLY INATTENTIVE TYPE: ICD-10-CM

## 2022-01-03 RX ORDER — METHYLPHENIDATE HYDROCHLORIDE 10 MG/1
10 TABLET ORAL 3 TIMES DAILY
Qty: 90 TABLET | Refills: 0 | Status: SHIPPED | OUTPATIENT
Start: 2022-01-03 | End: 2022-02-11

## 2022-01-05 ENCOUNTER — INFUSION THERAPY VISIT (OUTPATIENT)
Dept: INFUSION THERAPY | Facility: OTHER | Age: 67
End: 2022-01-05
Attending: INTERNAL MEDICINE
Payer: MEDICARE

## 2022-01-05 VITALS — WEIGHT: 118.83 LBS | BODY MASS INDEX: 21.05 KG/M2

## 2022-01-05 DIAGNOSIS — D70.1 CHEMOTHERAPY-INDUCED NEUTROPENIA (H): Primary | ICD-10-CM

## 2022-01-05 DIAGNOSIS — Z17.0 MALIGNANT NEOPLASM OF LOWER-OUTER QUADRANT OF RIGHT BREAST OF FEMALE, ESTROGEN RECEPTOR POSITIVE (H): ICD-10-CM

## 2022-01-05 DIAGNOSIS — C50.511 MALIGNANT NEOPLASM OF LOWER-OUTER QUADRANT OF RIGHT BREAST OF FEMALE, ESTROGEN RECEPTOR POSITIVE (H): ICD-10-CM

## 2022-01-05 DIAGNOSIS — T45.1X5A CHEMOTHERAPY-INDUCED NEUTROPENIA (H): Primary | ICD-10-CM

## 2022-01-05 LAB
ALBUMIN SERPL-MCNC: 3.2 G/DL (ref 3.4–5)
ALP SERPL-CCNC: 53 U/L (ref 40–150)
ALT SERPL W P-5'-P-CCNC: 33 U/L (ref 0–50)
ANION GAP SERPL CALCULATED.3IONS-SCNC: 5 MMOL/L (ref 3–14)
AST SERPL W P-5'-P-CCNC: 19 U/L (ref 0–45)
BASOPHILS # BLD MANUAL: 0.2 10E3/UL (ref 0–0.2)
BASOPHILS NFR BLD MANUAL: 6 %
BILIRUB SERPL-MCNC: 0.3 MG/DL (ref 0.2–1.3)
BUN SERPL-MCNC: 21 MG/DL (ref 7–30)
CALCIUM SERPL-MCNC: 8.8 MG/DL (ref 8.5–10.1)
CHLORIDE BLD-SCNC: 109 MMOL/L (ref 94–109)
CO2 SERPL-SCNC: 25 MMOL/L (ref 20–32)
CREAT SERPL-MCNC: 0.56 MG/DL (ref 0.52–1.04)
EOSINOPHIL # BLD MANUAL: 0.1 10E3/UL (ref 0–0.7)
EOSINOPHIL NFR BLD MANUAL: 3 %
ERYTHROCYTE [DISTWIDTH] IN BLOOD BY AUTOMATED COUNT: 18.7 % (ref 10–15)
GFR SERPL CREATININE-BSD FRML MDRD: >90 ML/MIN/1.73M2
GLUCOSE BLD-MCNC: 95 MG/DL (ref 70–99)
HCT VFR BLD AUTO: 29.4 % (ref 35–47)
HGB BLD-MCNC: 9.4 G/DL (ref 11.7–15.7)
LYMPHOCYTES # BLD MANUAL: 0.9 10E3/UL (ref 0.8–5.3)
LYMPHOCYTES NFR BLD MANUAL: 33 %
MCH RBC QN AUTO: 32.3 PG (ref 26.5–33)
MCHC RBC AUTO-ENTMCNC: 32 G/DL (ref 31.5–36.5)
MCV RBC AUTO: 101 FL (ref 78–100)
MONOCYTES # BLD MANUAL: 0.5 10E3/UL (ref 0–1.3)
MONOCYTES NFR BLD MANUAL: 18 %
NEUTROPHILS # BLD MANUAL: 1 10E3/UL (ref 1.6–8.3)
NEUTROPHILS NFR BLD MANUAL: 40 %
PLAT MORPH BLD: ABNORMAL
PLATELET # BLD AUTO: 253 10E3/UL (ref 150–450)
POTASSIUM BLD-SCNC: 4.2 MMOL/L (ref 3.4–5.3)
PROT SERPL-MCNC: 5.9 G/DL (ref 6.8–8.8)
RBC # BLD AUTO: 2.91 10E6/UL (ref 3.8–5.2)
RBC MORPH BLD: ABNORMAL
SODIUM SERPL-SCNC: 139 MMOL/L (ref 133–144)
WBC # BLD AUTO: 2.6 10E3/UL (ref 4–11)

## 2022-01-05 PROCEDURE — 80053 COMPREHEN METABOLIC PANEL: CPT | Mod: ZL

## 2022-01-05 PROCEDURE — 250N000011 HC RX IP 250 OP 636: Performed by: INTERNAL MEDICINE

## 2022-01-05 PROCEDURE — 85027 COMPLETE CBC AUTOMATED: CPT | Mod: ZL | Performed by: NURSE PRACTITIONER

## 2022-01-05 PROCEDURE — 96523 IRRIG DRUG DELIVERY DEVICE: CPT

## 2022-01-05 PROCEDURE — 36591 DRAW BLOOD OFF VENOUS DEVICE: CPT | Mod: ZL

## 2022-01-05 PROCEDURE — 82040 ASSAY OF SERUM ALBUMIN: CPT | Mod: ZL

## 2022-01-05 RX ORDER — HEPARIN SODIUM (PORCINE) LOCK FLUSH IV SOLN 100 UNIT/ML 100 UNIT/ML
5 SOLUTION INTRAVENOUS
Status: DISCONTINUED | OUTPATIENT
Start: 2022-01-05 | End: 2022-01-05 | Stop reason: HOSPADM

## 2022-01-05 RX ORDER — HEPARIN SODIUM (PORCINE) LOCK FLUSH IV SOLN 100 UNIT/ML 100 UNIT/ML
5 SOLUTION INTRAVENOUS
Status: CANCELLED | OUTPATIENT
Start: 2022-01-05

## 2022-01-05 RX ADMIN — HEPARIN 5 ML: 100 SYRINGE at 10:38

## 2022-01-05 NOTE — PROGRESS NOTES
Patient did not meet parameters for treatment patient ANC 1.0 Consult with Jeana Avila NP defer today's treatment and will reevaluate at appointment next week after she has an opportunity to speak with the oncologist.   Patient updated and given education on Neutropenia.  Patient verbalizes understanding.

## 2022-01-10 NOTE — PROGRESS NOTES
[facility]  DEPARTMENT OF RADIATION ONCOLOGY  CONSULTATION NOTE    Name: Anne Marie Nix MRN: [id number]   : 1955 (66 year old)  Date of Service: Sep 10, 2021 Referring: Dr. 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 The patient has seen numerous breast surgeons in the region and plans to see multiple medical oncologists as well as the breast oncology service at [place] for management of her invasive cancer. She palpated a painless mass in the lower outer quadrant of the right breast. A diagnostic mammogram and ultrasound (BI-RADS 4) on 2021 imaged a 1.25-cm solid mass at the 7:00 position of the right breast. The biopsy on 2021 yielded invasive lobular carcinoma. Physical examination was notable for a 2-cm mobile mass at approximately the 7:00 position of the right breast. The mass was close to the skin, but the overlying skin was intact and had no suspicious changes. The axilla had no suspicious masses. On 2021, she underwent a right breast lumpectomy and sentinel node biopsy. The pathology yielded a 3-cm invasive lobular carcinoma (Day[place] grade 2/3). The the initial lateral and the supplemental inferior margins were positive. The sentinel node specimen yielded 2/3  "positive nodes containing 2-mm and 3-mm dsease, respectively. Breast surgery recommended consideration of mastectomy and reconstruction due to anticipated suboptimal cosmesis after additional margin resection. Thus as an alternative to breast-conserving therapy, the patient is considering a completion mastectomy and a possible prophylactic contralateral mastectomy. The patient otherwise feels in excellent general health. She has adopted a lifestyle that she feels is oncologically supportive. She has also begun taking a number of dietary supplements and has been exercising regularly (biking, weightlifting, etc.). She has a family history of breast cancer including a her mother (70's), maternal aunt (90's), and two maternal cousins (30's). She  with age of first birth of 22. She used Depo-Provera for 18 years to help \"balance hormones\". She discontinued Depo-Provera around  after menopause. Medicaid was at age 17.    Chemotherapy History: None.  Radiation History: None.  Implanted Cardiac Devices: None.  Implanted Chest Wall Infusion Port: No port.    Past Medical History:   Diagnosis Date     Acne vulgaris      Cancer (H)      Chemotherapy-induced neutropenia (H) 10/25/2021     Complication of anesthesia     nauseated     Premenstrual tension syndromes 10/14/2004     Past Surgical History:   Procedure Laterality Date     INSERT PORT VASCULAR ACCESS N/A 10/19/2021    Procedure: port-a-cath placement;  Surgeon: Reginaldo Chen MD;  Location: [facility] OR     LUMPECTOMY BREAST Right 2021     [facility] OBSTETRICAL [facility]       Outpatient Encounter Medications as of 9/10/2021   Medication Sig Dispense Refill     mupirocin (BACTROBAN) 2 % external ointment Apply topically 3 times daily (Patient not taking: Reported on 11/10/2021) 30 g 0     pseudoePHEDrine (SUDAFED) 30 MG tablet Take 2 tablets by mouth every 4 hours as needed        valACYclovir (VALTREX) 1000 mg tablet Take 2 tablets (2,000 mg) by mouth 2 times daily Once " for break out (Patient not taking: Reported on 11/10/2021) 12 tablet 3     [DISCONTINUED] methylphenidate (RITALIN) 10 MG tablet Take 1 tablet (10 mg) by mouth 3 times daily 90 tablet 0     No facility-administered encounter medications on file as of 9/10/2021.     Allergies/Reactions: Vancomycin, Amoxicillin, Amoxicillin trihydrate, Clavulanic acid potassium, and Levaquin [levofloxacin]    No orders of the defined types were placed in this encounter.    Social History     Social History Narrative     Not on file     Socioeconomic History     Marital status:      Social History     Tobacco Use     Smoking status: Never Smoker     Smokeless tobacco: Never Used   Substance Use Topics     Alcohol use: No     Drug use: No     Family History   Problem Relation Age of Onset     Allergies Mother      Breast Cancer Mother      Diabetes Mother      Cardiovascular Paternal Grandfather      Allergies Paternal Grandfather      Angina Paternal Grandfather      Coronary Artery Disease Paternal Grandfather      Other - See Comments Father      Depression Maternal Grandmother      Cerebrovascular Disease Maternal Grandmother      Thyroid Disease Maternal Grandmother      Colon Cancer Maternal Grandfather      Diabetes Maternal Grandfather      Rheumatoid Arthritis Paternal Grandmother      Breast Cancer Cousin      Breast Cancer Maternal Aunt      Cancer Maternal Aunt         esphogeal, lung     Hypertension No family hx of      Hyperlipidemia No family hx of      Prostate Cancer No family hx of      Anesthesia Reaction No family hx of      Asthma No family hx of      Genetic Disorder No family hx of    No other cancers in first or second degree relatives.    Baseline Review of Systems (prior to radiation therapy; supplemental to the History)   ROS (score of 0 indicates that symptom is at baseline for most sections below): See Flowsheet for additional comments as indicated.  No Pain (0). Denies fevers, chills, night sweats,  "or unintentional weight loss. Denies suspicious breast masses or skin changes. Denies axillary masses or supraclavicular masses. Denies headache, nausea, vomiting. Denies dyspnea, hemoptysis, and chest discomfort. Denies diarrhea, constipation, or bowel habit changes. Denies dysuria, hematuria, or incontinence. Denies bony pain. Denies focal weakness, focal sensory deficits, or mental status changes.                               Physical Exam   KPS: 90 (normal activity, minor signs/symptoms of disease).  ECOG Status: 0 (Fully active, able to carry on all activities without restriction)  Vitals: Ht 1.6 m (5' 3\")   Wt 53.3 kg (117 lb 9.6 oz)   BMI 20.83 kg/m    Wt Readings from Last 3 Encounters:   01/05/22 53.9 kg (118 lb 13.3 oz)   12/29/21 52.2 kg (115 lb 1.3 oz)   12/22/21 51.2 kg (112 lb 14 oz)     Clinical Examination: Seen with nursing.  General: Appears clinically/medically stable. Appears in very good general health. Appears fit. Appears rested. Appears stated age. Appears well-developed, well-nourished, and in no acute distress. Does not appear acutely or chronically ill. Appears well groomed.  Head: No alopecia. Normocephalic.  Eyes: Anicteric, vision intact.  ENT: Good volume. No hoarseness.  Neck: Soft, supple, symmetric, trachea midline.  Lymphatics: No cervical, supraclavicular, axillary, or parasternal adenopathy.  Chest/Breasts: Right breast and axilla scars are clean, dry, and intact. No discharge, purulence, tenderness, or cellulitis. Skin integrity and texture are excellent. Good symmetry. Bilaterally, the **breasts and axillae have no suspicious palpable masses, induration, skin changes, or nipple discharge. No skin breakdown, wound breakdown, desquamation, discharge, odor, or cellulitis. No peau d'orange. No port. No implanted cardiac device.  Lungs: Easy respirations, no use of accessory muscles. Clear to auscultation  Cardiovascular: No jugulovenous distension. No carotid pulsations. RRR, " S1, S2.  Abdomen: Nondistended, nontender. Soft. Bowel sounds positive.  Pelvis: Nondistended, nontender. Soft. Bowel sounds positive.  MSK: Shoulder range of motion is good. No arm edema or hand edema. Good muscle tone. No tenderness on palpation. Good posture. No cords or calf tenderness.  Integument: No jaundice or pallor. No ecchymosis.  Neurologic: Right-handed. Alert, oriented, fluent. Memory intact. Motor intact. Sensory intact. No ataxia.  Psychologic: Well-spoken about her cancer and therapy. Motivated. Somewhat anxious. Pleasant, cooperative, insightful, and concrete.    Physician Time on Day of Encounter, and MDM Data Reviewed and Analyzed on Day of Encounter   Time spent on patient activities is based on Chart review 45 minutes, Visit 35 minutes, and Documentation 35 minutes. Total time: 115 minutes.    MDM based on:       High risk element:  o Node-positive cancer of the right breast. Right breast-conserving therapy. Pending chemotherapy (anticipated). Pending radiation therapy (possible).       Tests, documents, or independent historian(s) analyses include but are not limited to:  o Those referenced above in the HPI.       Independent Interpretations of tests include but are not limited to:  o Those referenced above in the HPI.    Physician Information Review   I reviewed the case with the patient, evaluated her, and discussed her treatment options and risks of therapy. I reviewed the medical records, radiographic information, and pathologic studies. I reviewed the imaging studies via PACS and with the patient. I reviewed our intake sheets. The patient is a good historian, began the visit with good insights into the disease process, and acknowledged the potentially poor consequences of her disease. She educated herself through a variety of educational media including the Internet. She demonstrated good comprehension of our discussion and explored the treatment options with good understanding. The  patient asked pertinent questions and insightful follow-up questions. I illustrated the anatomy and field of treatment. We discussed the onsite and telemedicine coverage of our clinic. I offered to speak with her family members and friends today by speakerphone. The patient declined my offer but granted me permission to speak with them if they contact me or come to clinic. She declined referral for an additional opinion or conservative care. She accepted referral to our social work staff for additional resources including potential counseling. The patient granted me permission to exchange medical information and records with other healthcare professionals. No therapeutic radiation protocols for the patient's disease are available at our Center. We reviewed educational materials including Internet resources and provided the patient with written materials.    Physician Radiation Therapy Assessment/Plan   In summary, I explained to the patient that the role of radiation therapy will be dependent upon her choice of breast-conserving therapy versus mastectomy as well as the pathological results of the pending margin reexcision. Although the pending results of the Oncotype DX score will not directly influence the role of radiation therapy, the results might influence the sequence of therapy if adjuvant cytotoxic chemotherapy is indicated. In the context of national guidelines such as the NCCN as well as those addressing treatment during the ongoing COVID-19 pandemic, we discussed the potentially beneficial role of radiation therapy following breast-conserving therapy. We also discussed the potential beneficial role following mastectomy for multiply node-positive or advanced local breast disease. We discussed the patient's concerns about the nature of radiation therapy on her general health as well as potential negative impact on her oncologic health. We discussed the concept of CT-based simulation, breast/chest wall  versus regional kaelyn radiation as an alternative to formal axillary node dissection. We discussed the concept of moderate hypofractionation versus standard conventional fractionation. We discussed the concept of regional treatment followed by a boost for inadequate margins. At this time, no follow-up is scheduled for radiation therapy. We will refer the patient back to the breast surgery service and medical oncology service for a final decision about the type of breast surgery and role of systemic therapy following surgery. Based upon the pending results and patient's choice of therapy, the patient can be referred back to me for a discussion of postoperative radiation therapy as indicated. The patient was proceed as recommended. We answered all questions to her satisfaction. Thank you for allowing us to participate in the care of this very pleasant patient.      Juanito Garcia MD, PhD  Department of Radiation Oncology  Tel: [phone]  Fax: [phone]    Care Team:  Sofy Cobb D.O. (breast surgery)  Beck Colunga M.D., Ph.D. (medical oncology)  hSerrill Strickland M.D. (medicine)

## 2022-01-12 ENCOUNTER — LAB (OUTPATIENT)
Dept: INFUSION THERAPY | Facility: OTHER | Age: 67
End: 2022-01-12
Attending: INTERNAL MEDICINE
Payer: MEDICARE

## 2022-01-12 ENCOUNTER — ONCOLOGY VISIT (OUTPATIENT)
Dept: ONCOLOGY | Facility: OTHER | Age: 67
End: 2022-01-12
Attending: NURSE PRACTITIONER
Payer: MEDICARE

## 2022-01-12 ENCOUNTER — PATIENT OUTREACH (OUTPATIENT)
Dept: ONCOLOGY | Facility: CLINIC | Age: 67
End: 2022-01-12
Payer: MEDICARE

## 2022-01-12 VITALS — DIASTOLIC BLOOD PRESSURE: 82 MMHG | HEART RATE: 86 BPM | SYSTOLIC BLOOD PRESSURE: 127 MMHG

## 2022-01-12 VITALS
DIASTOLIC BLOOD PRESSURE: 80 MMHG | RESPIRATION RATE: 20 BRPM | BODY MASS INDEX: 21.56 KG/M2 | OXYGEN SATURATION: 98 % | HEIGHT: 63 IN | HEART RATE: 105 BPM | WEIGHT: 121.69 LBS | SYSTOLIC BLOOD PRESSURE: 138 MMHG | TEMPERATURE: 97.9 F

## 2022-01-12 DIAGNOSIS — Z17.0 MALIGNANT NEOPLASM OF LOWER-OUTER QUADRANT OF RIGHT BREAST OF FEMALE, ESTROGEN RECEPTOR POSITIVE (H): ICD-10-CM

## 2022-01-12 DIAGNOSIS — T45.1X5A CHEMOTHERAPY-INDUCED NEUTROPENIA (H): ICD-10-CM

## 2022-01-12 DIAGNOSIS — Z17.0 MALIGNANT NEOPLASM OF LOWER-OUTER QUADRANT OF RIGHT BREAST OF FEMALE, ESTROGEN RECEPTOR POSITIVE (H): Primary | ICD-10-CM

## 2022-01-12 DIAGNOSIS — Z80.3 FAMILY HISTORY OF MALIGNANT NEOPLASM OF BREAST: ICD-10-CM

## 2022-01-12 DIAGNOSIS — L65.8 DRUG-RELATED ALOPECIA: ICD-10-CM

## 2022-01-12 DIAGNOSIS — T50.905A DRUG-RELATED ALOPECIA: ICD-10-CM

## 2022-01-12 DIAGNOSIS — C50.511 MALIGNANT NEOPLASM OF LOWER-OUTER QUADRANT OF RIGHT BREAST OF FEMALE, ESTROGEN RECEPTOR POSITIVE (H): ICD-10-CM

## 2022-01-12 DIAGNOSIS — T45.1X5A CHEMOTHERAPY-INDUCED NEUTROPENIA (H): Primary | ICD-10-CM

## 2022-01-12 DIAGNOSIS — D70.1 CHEMOTHERAPY-INDUCED NEUTROPENIA (H): Primary | ICD-10-CM

## 2022-01-12 DIAGNOSIS — R74.01 ELEVATED ALT MEASUREMENT: ICD-10-CM

## 2022-01-12 DIAGNOSIS — C50.511 MALIGNANT NEOPLASM OF LOWER-OUTER QUADRANT OF RIGHT BREAST OF FEMALE, ESTROGEN RECEPTOR POSITIVE (H): Primary | ICD-10-CM

## 2022-01-12 DIAGNOSIS — D70.1 CHEMOTHERAPY-INDUCED NEUTROPENIA (H): ICD-10-CM

## 2022-01-12 DIAGNOSIS — D64.9 ANEMIA, UNSPECIFIED TYPE: ICD-10-CM

## 2022-01-12 LAB
ALBUMIN SERPL-MCNC: 3.5 G/DL (ref 3.4–5)
ALP SERPL-CCNC: 74 U/L (ref 40–150)
ALT SERPL W P-5'-P-CCNC: 58 U/L (ref 0–50)
ANION GAP SERPL CALCULATED.3IONS-SCNC: 3 MMOL/L (ref 3–14)
AST SERPL W P-5'-P-CCNC: 35 U/L (ref 0–45)
BASOPHILS # BLD AUTO: 0.1 10E3/UL (ref 0–0.2)
BASOPHILS NFR BLD AUTO: 3 %
BILIRUB SERPL-MCNC: 0.3 MG/DL (ref 0.2–1.3)
BUN SERPL-MCNC: 23 MG/DL (ref 7–30)
CALCIUM SERPL-MCNC: 9 MG/DL (ref 8.5–10.1)
CHLORIDE BLD-SCNC: 109 MMOL/L (ref 94–109)
CO2 SERPL-SCNC: 26 MMOL/L (ref 20–32)
CREAT SERPL-MCNC: 0.5 MG/DL (ref 0.52–1.04)
CRP SERPL-MCNC: <2.9 MG/L (ref 0–8)
DACRYOCYTES BLD QL SMEAR: SLIGHT
EOSINOPHIL # BLD AUTO: 0.1 10E3/UL (ref 0–0.7)
EOSINOPHIL NFR BLD AUTO: 3 %
ERYTHROCYTE [DISTWIDTH] IN BLOOD BY AUTOMATED COUNT: 17.1 % (ref 10–15)
FERRITIN SERPL-MCNC: 149 NG/ML (ref 8–252)
GFR SERPL CREATININE-BSD FRML MDRD: >90 ML/MIN/1.73M2
GLUCOSE BLD-MCNC: 96 MG/DL (ref 70–99)
HCT VFR BLD AUTO: 32.9 % (ref 35–47)
HGB BLD-MCNC: 10.4 G/DL (ref 11.7–15.7)
IMM GRANULOCYTES # BLD: 0 10E3/UL
IMM GRANULOCYTES NFR BLD: 1 %
LYMPHOCYTES # BLD AUTO: 0.7 10E3/UL (ref 0.8–5.3)
LYMPHOCYTES NFR BLD AUTO: 21 %
MCH RBC QN AUTO: 32.2 PG (ref 26.5–33)
MCHC RBC AUTO-ENTMCNC: 31.6 G/DL (ref 31.5–36.5)
MCV RBC AUTO: 102 FL (ref 78–100)
MONOCYTES # BLD AUTO: 0.8 10E3/UL (ref 0–1.3)
MONOCYTES NFR BLD AUTO: 24 %
NEUTROPHILS # BLD AUTO: 1.6 10E3/UL (ref 1.6–8.3)
NEUTROPHILS NFR BLD AUTO: 48 %
NRBC # BLD AUTO: 0 10E3/UL
NRBC BLD AUTO-RTO: 0 /100
PLAT MORPH BLD: ABNORMAL
PLATELET # BLD AUTO: 247 10E3/UL (ref 150–450)
POTASSIUM BLD-SCNC: 4.2 MMOL/L (ref 3.4–5.3)
PROT SERPL-MCNC: 6.3 G/DL (ref 6.8–8.8)
RBC # BLD AUTO: 3.23 10E6/UL (ref 3.8–5.2)
RBC MORPH BLD: ABNORMAL
SODIUM SERPL-SCNC: 138 MMOL/L (ref 133–144)
WBC # BLD AUTO: 3.3 10E3/UL (ref 4–11)

## 2022-01-12 PROCEDURE — G0463 HOSPITAL OUTPT CLINIC VISIT: HCPCS

## 2022-01-12 PROCEDURE — 86140 C-REACTIVE PROTEIN: CPT | Mod: ZL

## 2022-01-12 PROCEDURE — 36591 DRAW BLOOD OFF VENOUS DEVICE: CPT | Mod: ZL

## 2022-01-12 PROCEDURE — 96367 TX/PROPH/DG ADDL SEQ IV INF: CPT

## 2022-01-12 PROCEDURE — 96365 THER/PROPH/DIAG IV INF INIT: CPT

## 2022-01-12 PROCEDURE — 82947 ASSAY GLUCOSE BLOOD QUANT: CPT | Mod: ZL

## 2022-01-12 PROCEDURE — G0463 HOSPITAL OUTPT CLINIC VISIT: HCPCS | Mod: 25

## 2022-01-12 PROCEDURE — 250N000011 HC RX IP 250 OP 636: Performed by: NURSE PRACTITIONER

## 2022-01-12 PROCEDURE — 99215 OFFICE O/P EST HI 40 MIN: CPT | Performed by: NURSE PRACTITIONER

## 2022-01-12 PROCEDURE — 85025 COMPLETE CBC W/AUTO DIFF WBC: CPT | Mod: ZL | Performed by: INTERNAL MEDICINE

## 2022-01-12 PROCEDURE — 96523 IRRIG DRUG DELIVERY DEVICE: CPT

## 2022-01-12 PROCEDURE — 82728 ASSAY OF FERRITIN: CPT | Mod: ZL

## 2022-01-12 PROCEDURE — 258N000003 HC RX IP 258 OP 636: Performed by: NURSE PRACTITIONER

## 2022-01-12 PROCEDURE — 96413 CHEMO IV INFUSION 1 HR: CPT

## 2022-01-12 RX ORDER — DIPHENHYDRAMINE HYDROCHLORIDE 50 MG/ML
50 INJECTION INTRAMUSCULAR; INTRAVENOUS
Status: CANCELLED
Start: 2022-01-26

## 2022-01-12 RX ORDER — DIPHENHYDRAMINE HCL 50 MG
50 CAPSULE ORAL
Status: CANCELLED
Start: 2022-01-26

## 2022-01-12 RX ORDER — GARLIC 200 MG
TABLET ORAL
COMMUNITY
End: 2022-11-23

## 2022-01-12 RX ORDER — MEPERIDINE HYDROCHLORIDE 25 MG/ML
25 INJECTION INTRAMUSCULAR; INTRAVENOUS; SUBCUTANEOUS EVERY 30 MIN PRN
Status: CANCELLED | OUTPATIENT
Start: 2022-01-26

## 2022-01-12 RX ORDER — HEPARIN SODIUM,PORCINE 10 UNIT/ML
5 VIAL (ML) INTRAVENOUS
Status: CANCELLED | OUTPATIENT
Start: 2022-01-12

## 2022-01-12 RX ORDER — DIPHENHYDRAMINE HYDROCHLORIDE 50 MG/ML
50 INJECTION INTRAMUSCULAR; INTRAVENOUS
Status: CANCELLED
Start: 2022-01-12

## 2022-01-12 RX ORDER — ALBUTEROL SULFATE 0.83 MG/ML
2.5 SOLUTION RESPIRATORY (INHALATION)
Status: CANCELLED | OUTPATIENT
Start: 2022-02-02

## 2022-01-12 RX ORDER — ALBUTEROL SULFATE 0.83 MG/ML
2.5 SOLUTION RESPIRATORY (INHALATION)
Status: CANCELLED | OUTPATIENT
Start: 2022-01-26

## 2022-01-12 RX ORDER — METHYLPREDNISOLONE SODIUM SUCCINATE 125 MG/2ML
125 INJECTION, POWDER, LYOPHILIZED, FOR SOLUTION INTRAMUSCULAR; INTRAVENOUS
Status: CANCELLED
Start: 2022-01-12

## 2022-01-12 RX ORDER — METHYLPREDNISOLONE SODIUM SUCCINATE 125 MG/2ML
125 INJECTION, POWDER, LYOPHILIZED, FOR SOLUTION INTRAMUSCULAR; INTRAVENOUS
Status: CANCELLED
Start: 2022-02-02

## 2022-01-12 RX ORDER — MEPERIDINE HYDROCHLORIDE 25 MG/ML
25 INJECTION INTRAMUSCULAR; INTRAVENOUS; SUBCUTANEOUS EVERY 30 MIN PRN
Status: CANCELLED | OUTPATIENT
Start: 2022-02-02

## 2022-01-12 RX ORDER — MEPERIDINE HYDROCHLORIDE 25 MG/ML
25 INJECTION INTRAMUSCULAR; INTRAVENOUS; SUBCUTANEOUS EVERY 30 MIN PRN
Status: CANCELLED | OUTPATIENT
Start: 2022-01-12

## 2022-01-12 RX ORDER — LORAZEPAM 2 MG/ML
0.5 INJECTION INTRAMUSCULAR EVERY 4 HOURS PRN
Status: CANCELLED | OUTPATIENT
Start: 2022-01-26

## 2022-01-12 RX ORDER — ALBUTEROL SULFATE 90 UG/1
1-2 AEROSOL, METERED RESPIRATORY (INHALATION)
Status: CANCELLED
Start: 2022-01-12

## 2022-01-12 RX ORDER — DIPHENHYDRAMINE HCL 50 MG
50 CAPSULE ORAL
Status: CANCELLED
Start: 2022-02-02

## 2022-01-12 RX ORDER — HEPARIN SODIUM (PORCINE) LOCK FLUSH IV SOLN 100 UNIT/ML 100 UNIT/ML
5 SOLUTION INTRAVENOUS
Status: CANCELLED | OUTPATIENT
Start: 2022-02-02

## 2022-01-12 RX ORDER — HEPARIN SODIUM,PORCINE 10 UNIT/ML
5 VIAL (ML) INTRAVENOUS
Status: CANCELLED | OUTPATIENT
Start: 2022-02-02

## 2022-01-12 RX ORDER — ALBUTEROL SULFATE 90 UG/1
1-2 AEROSOL, METERED RESPIRATORY (INHALATION)
Status: CANCELLED
Start: 2022-01-26

## 2022-01-12 RX ORDER — HEPARIN SODIUM (PORCINE) LOCK FLUSH IV SOLN 100 UNIT/ML 100 UNIT/ML
5 SOLUTION INTRAVENOUS
Status: CANCELLED | OUTPATIENT
Start: 2022-01-12

## 2022-01-12 RX ORDER — DIPHENHYDRAMINE HCL 50 MG
50 CAPSULE ORAL
Status: CANCELLED
Start: 2022-01-12

## 2022-01-12 RX ORDER — DIPHENHYDRAMINE HYDROCHLORIDE 50 MG/ML
50 INJECTION INTRAMUSCULAR; INTRAVENOUS
Status: CANCELLED
Start: 2022-02-02

## 2022-01-12 RX ORDER — NALOXONE HYDROCHLORIDE 0.4 MG/ML
0.2 INJECTION, SOLUTION INTRAMUSCULAR; INTRAVENOUS; SUBCUTANEOUS
Status: CANCELLED | OUTPATIENT
Start: 2022-01-12

## 2022-01-12 RX ORDER — ALBUTEROL SULFATE 0.83 MG/ML
2.5 SOLUTION RESPIRATORY (INHALATION)
Status: CANCELLED | OUTPATIENT
Start: 2022-01-12

## 2022-01-12 RX ORDER — EPINEPHRINE 1 MG/ML
0.3 INJECTION, SOLUTION, CONCENTRATE INTRAVENOUS EVERY 5 MIN PRN
Status: CANCELLED | OUTPATIENT
Start: 2022-01-12

## 2022-01-12 RX ORDER — EPINEPHRINE 1 MG/ML
0.3 INJECTION, SOLUTION, CONCENTRATE INTRAVENOUS EVERY 5 MIN PRN
Status: CANCELLED | OUTPATIENT
Start: 2022-02-02

## 2022-01-12 RX ORDER — NALOXONE HYDROCHLORIDE 0.4 MG/ML
0.2 INJECTION, SOLUTION INTRAMUSCULAR; INTRAVENOUS; SUBCUTANEOUS
Status: CANCELLED | OUTPATIENT
Start: 2022-01-26

## 2022-01-12 RX ORDER — HEPARIN SODIUM,PORCINE 10 UNIT/ML
5 VIAL (ML) INTRAVENOUS
Status: CANCELLED | OUTPATIENT
Start: 2022-01-26

## 2022-01-12 RX ORDER — NALOXONE HYDROCHLORIDE 0.4 MG/ML
0.2 INJECTION, SOLUTION INTRAMUSCULAR; INTRAVENOUS; SUBCUTANEOUS
Status: CANCELLED | OUTPATIENT
Start: 2022-02-02

## 2022-01-12 RX ORDER — ALBUTEROL SULFATE 90 UG/1
1-2 AEROSOL, METERED RESPIRATORY (INHALATION)
Status: CANCELLED
Start: 2022-02-02

## 2022-01-12 RX ORDER — EPINEPHRINE 1 MG/ML
0.3 INJECTION, SOLUTION, CONCENTRATE INTRAVENOUS EVERY 5 MIN PRN
Status: CANCELLED | OUTPATIENT
Start: 2022-01-26

## 2022-01-12 RX ORDER — METHYLPREDNISOLONE SODIUM SUCCINATE 125 MG/2ML
125 INJECTION, POWDER, LYOPHILIZED, FOR SOLUTION INTRAMUSCULAR; INTRAVENOUS
Status: CANCELLED
Start: 2022-01-26

## 2022-01-12 RX ORDER — HEPARIN SODIUM (PORCINE) LOCK FLUSH IV SOLN 100 UNIT/ML 100 UNIT/ML
5 SOLUTION INTRAVENOUS
Status: DISCONTINUED | OUTPATIENT
Start: 2022-01-12 | End: 2022-01-12 | Stop reason: HOSPADM

## 2022-01-12 RX ORDER — HEPARIN SODIUM (PORCINE) LOCK FLUSH IV SOLN 100 UNIT/ML 100 UNIT/ML
5 SOLUTION INTRAVENOUS
Status: CANCELLED | OUTPATIENT
Start: 2022-01-26

## 2022-01-12 RX ORDER — LORAZEPAM 2 MG/ML
0.5 INJECTION INTRAMUSCULAR EVERY 4 HOURS PRN
Status: CANCELLED | OUTPATIENT
Start: 2022-02-02

## 2022-01-12 RX ORDER — LORAZEPAM 2 MG/ML
0.5 INJECTION INTRAMUSCULAR EVERY 4 HOURS PRN
Status: CANCELLED | OUTPATIENT
Start: 2022-01-12

## 2022-01-12 RX ADMIN — PACLITAXEL 123 MG: 6 INJECTION, SOLUTION INTRAVENOUS at 11:50

## 2022-01-12 RX ADMIN — SODIUM CHLORIDE 250 ML: 9 INJECTION, SOLUTION INTRAVENOUS at 11:20

## 2022-01-12 RX ADMIN — ONDANSETRON: 2 INJECTION INTRAMUSCULAR; INTRAVENOUS at 11:20

## 2022-01-12 RX ADMIN — Medication 5 ML: at 12:54

## 2022-01-12 ASSESSMENT — PAIN SCALES - GENERAL: PAINLEVEL: NO PAIN (0)

## 2022-01-12 ASSESSMENT — MIFFLIN-ST. JEOR: SCORE: 1066.13

## 2022-01-12 NOTE — PATIENT INSTRUCTIONS
We would like to see you back per your schedule.     Referral was sent to cancer risk management for genetic counseling appointment.    When you are in need of a refill, please call your pharmacy and they will send us a request.     If you have any questions please call [phone]    Other instructions:  none

## 2022-01-12 NOTE — PROGRESS NOTES
Patient is 66  years old, here accompanied by self today for infusion of Taxol under the orders of Dr. Rolle.   Patient lab values:   Labs reviewed today with provider during provider visit. Patient meets parameters for today's infusion.  Denies questions or concerns regarding today's infusion and/or medications being administered.    Independent dose check completed with Orlin HUANG RN.  Patient identified with two identifiers, order verified, and verbal consent for today's infusion obtained from patient.      1150 IV pump verified with Taxol dose, drug, and rate of administration. Infusion administered per protocol. Patient tolerated infusion well, no signs or symptoms of adverse reaction noted. Patient denies pain nor discomfort.     Needle removed, tip intact. Site clean, dry and intact. Covered with a sterile bandage, slight pressure applied for 30 seconds. Pt instructed to leave bandage intact for a minimum of one hour, and to call with questions or concerns. Copy of appointments, discharge instructions, and after visit summary (AVS) provided to patient. Patient states understanding, discharged.

## 2022-01-12 NOTE — NURSING NOTE
"Oncology Rooming Note    January 12, 2022 9:31 AM   Anne Marie Nix is a 66 year old female who presents for:    Chief Complaint   Patient presents with     Oncology Clinic Visit     Follow up Malignant neoplasm of lower-outer quadrant of right breast of female, estrogen receptor positive      Initial Vitals: /80   Pulse 105   Temp 97.9  F (36.6  C) (Tympanic)   Resp 20   Ht 1.6 m (5' 3\")   Wt 55.2 kg (121 lb 11.1 oz)   SpO2 98%   BMI 21.56 kg/m   Estimated body mass index is 21.56 kg/m  as calculated from the following:    Height as of this encounter: 1.6 m (5' 3\").    Weight as of this encounter: 55.2 kg (121 lb 11.1 oz). Body surface area is 1.57 meters squared.  No Pain (0) Comment: Data Unavailable   No LMP recorded. Patient is postmenopausal.  Allergies reviewed: Yes  Medications reviewed: Yes    Medications: Medication refills not needed today.  Pharmacy name entered into [facility]:    "[id number] - [place], [place] - [address]  "[id number] - [place], [place] - [address]  "[id number] - [place], [place] - [address] AT [facility] [address]  [facility] - [place], [place] - [address]          Gaby Drew LPN            "

## 2022-01-12 NOTE — PROGRESS NOTES
Oncology Follow-up Visit:  January 12, 2022    Reason for Visit:  Patient presents with:  Oncology Clinic Visit: Follow up Malignant neoplasm of lower-outer quadrant of right breast of female, estrogen receptor positive      Nursing Note and documentation reviewed: yes    HPI:  This is a 66-year-old female patient who presents to the oncology clinic today for evaluation prior to receiving cycle 7 chemotherapy for stage IIB invasive lobular carcinoma of the right breast diagnosed 4/2021.  She completed the AC portion of her treatment on 12/8/2021 and is now receiving Taxol.  Chemotherapy was held last week (cycle 7 -week 3 taxol) related to low ANC.     She presents to the clinic today stating she is doing pretty well.  She does admit increased fatigue and this is somewhat frustrating as she is normally very active.  She continues with some right hip pain after falling but states this is much improved.  She does feel that it set her back some and that she has not been able to be as active as she would like while undergoing therapy.  She does plan to start walking at a walking track near her home.  She has been sleeping good.  Otherwise, she has been eating healthy and drinking lots of fluids and she continues to follow with her functional medicine provider.    She does question a genetics appointment.    Oncologic History:     3/30/2021 patient palpated a mass in the right breast  4/9/2021 she underwent mammogram showing an abnormality in the lower outer aspect of the right breast  4/13/2021  She underwent ultrasound-guided biopsy with pathology showing invasive lobular carcinoma, grade 2, ER positive, IA negative, HER-2/kevin negative  7/9/2021  She underwent bilateral breast MRI showing Corresponding to the ultrasound finding in the right lower breast at about the 7 o'clock position an area of significant increased enhancement and mass extends for about 2.4 x 1.0 x 1.9 cm. This is larger than the ultrasound  measurements.   8/23/2021 she underwent lumpectomy with sentinel nodes by Dr. Sofy Cobb with [facility] and pathology showed a 3 cm, grade 2 invasive lobular carcinoma with inferior lateral margin positive for invasive carcinoma; 3 sentinel nodes obtained, 1 with micrometastasis measuring 2 mm and 1 with macrometastasis and she was staged [id number]  9/15/2021 she was seen by Dr. Nunez Friday with Medical Oncology and felt the patient had an early stage breast cancer, stage II with positive margins and he recommended completion mastectomy and felt she would not likely benefit from additional axillary lymph node surgery; Oncotype DX resulted at 53 and he recommended 4 cycles of dose dense AC followed by 12 weeks of Taxol  9/27/2021  Echocardiogram showed an EF of 65%  10/11/2021  Patient was seen by Dr. Rolle with Medical Oncology at [facility] for a second opinion and he recommended a PET scan and MRI of the brain  10/11/2021  MRI of the brain was negative   10/13/2021  PET scan showed postop changes consistent with history of right breast lumpectomy and axillary node dissection and no evidence of metastatic disease  10/18/2021  She was seen again by Dr. Rolle and Staged IIB with recommendation was for AC x4 cycles followed by weekly Taxol x12  10/19/2021  Port-A-Cath placement by Dr. Chen  Echocardiogram  10/27/2021 cycle 1 Adriamycin/Cyclophosphamide  12/22/2021 initiation of Taxol      Genetics:  Referred/not completed     Current Chemo Regime/TX: Paclitaxel 80 mg per metered squared weekly  Current Cycle:  7 (cycle 3 taxol)  # of completed cycles:  6 (2 taxol)    **Third cycle of Taxol delayed related to ANC of 0.9/treatment parameter for ANC changed to 1.2 on 1/12/2022     Previous treatment:  Doxorubicin 60 mg per metered squared and cyclophosphamide 600 mg per metered squared given every 14 days with pegfilgrastim 6 mg subcutaneous day 2    Past Medical History:   Diagnosis Date     Acne  vulgaris      Cancer (H)      Chemotherapy-induced neutropenia ([facility]) 10/25/2021     Complication of anesthesia     nauseated     Premenstrual tension syndromes 10/14/2004       Past Surgical History:   Procedure Laterality Date     INSERT PORT VASCULAR ACCESS N/A 10/19/2021    Procedure: port-a-cath placement;  Surgeon: Reginaldo Chen MD;  Location: [facility]     LUMPECTOMY BREAST Right 08/23/2021     ZZC OBSTETRICAL D&C         Family History   Problem Relation Age of Onset     Allergies Mother      Breast Cancer Mother      Diabetes Mother      Cardiovascular Paternal Grandfather      Allergies Paternal Grandfather      Angina Paternal Grandfather      Coronary Artery Disease Paternal Grandfather      Other - See Comments Father      Depression Maternal Grandmother      Cerebrovascular Disease Maternal Grandmother      Thyroid Disease Maternal Grandmother      Colon Cancer Maternal Grandfather      Diabetes Maternal Grandfather      Rheumatoid Arthritis Paternal Grandmother      Breast Cancer Cousin      Breast Cancer Maternal Aunt      Cancer Maternal Aunt         esphogeal, lung     Hypertension No family hx of      Hyperlipidemia No family hx of      Prostate Cancer No family hx of      Anesthesia Reaction No family hx of      Asthma No family hx of      Genetic Disorder No family hx of        Social History     Socioeconomic History     Marital status:      Spouse name: Not on file     Number of children: Not on file     Years of education: Not on file     Highest education level: Not on file   Occupational History     Not on file   Tobacco Use     Smoking status: Never Smoker     Smokeless tobacco: Never Used   Substance and Sexual Activity     Alcohol use: No     Drug use: No     Sexual activity: Not on file   Other Topics Concern     Parent/sibling w/ CABG, MI or angioplasty before 65F 55M? Yes   Social History Narrative     Not on file     Social Determinants of Health     Financial Resource Strain:  Not on file   Food Insecurity: Not on file   Transportation Needs: Not on file   Physical Activity: Not on file   Stress: Not on file   Social Connections: Not on file   Intimate Partner Violence: Not on file   Housing Stability: Not on file       Current Outpatient Medications   Medication     Ascorbic Acid (VITAMIN C) POWD     Lysine 1000 MG TABS     methylphenidate (RITALIN) 10 MG tablet     NONFORMULARY     NONFORMULARY     vitamin B complex with vitamin C (VITAMIN  B COMPLEX) tablet     clindamycin-benzoyl peroxide (BENZACLIN) 1-5 % external gel     dapsone 5 % topical gel     mupirocin (BACTROBAN) 2 % external ointment     NONFORMULARY     OLANZapine (ZYPREXA) 10 MG tablet     ondansetron (ZOFRAN) 8 MG tablet     prochlorperazine (COMPAZINE) 10 MG tablet     pseudoePHEDrine (SUDAFED) 30 MG tablet     Turmeric Curcumin 500 MG CAPS     valACYclovir (VALTREX) 1000 mg tablet     No current facility-administered medications for this visit.        Allergies   Allergen Reactions     Vancomycin Other (See Comments)     Legs went numb- side effect     Amoxicillin Unknown     Headache  Stomach aches  Augmentin     Amoxicillin Trihydrate Other (See Comments)     Headache  Stomach aches  Augmentin         Clavulanic Acid Potassium Other (See Comments)     Headaches  Stomach aches  Augmentin       Levaquin [Levofloxacin] Other (See Comments)     Numbness in leg       Review Of Systems:  Constitutional:    denies fever, weight changes, chills, and night sweats.  Eyes:    denies blurred or double vision  Ears/Nose/Throat:   denies ear pain, nose problems, difficulty swallowing  Respiratory:   denies shortness of breath, cough   Skin:   denies rash, lesions  Cardiovascular:   denies chest pain, palpitations, occasional mild edema to lower extremities at times  Gastrointestinal:   denies abdominal pain, bloating, nausea, early satiety; no change in bowel habits or blood in stool  Genitourinary:   denies difficulty with  "urination, blood in urine  Musculoskeletal:    denies new muscle pain, bone pain  Neurologic:   denies lightheadedness, headaches, numbness or tingling  Psychiatric:   denies anxiety, depression  Hematologic/Lymphatic/Immunologic:   denies easy bruising, easy bleeding, lumps or bumps noted  Endocrine:   Has some dry mouth; Denies increased thirst    ECOG Performance Status: 1    Physical Exam:  /80   Pulse 105   Temp 97.9  F (36.6  C) (Tympanic)   Resp 20   Ht 1.6 m (5' 3\")   Wt 55.2 kg (121 lb 11.1 oz)   SpO2 98%   BMI 21.56 kg/m      GENERAL APPEARANCE: Healthy, alert and in no acute distress.  HEENT: Normocephalic, Sclerae anicteric.  No oral lesions or thrush  NECK:   No asymmetry or masses, no thyromegaly.  LYMPHATICS: No palpable cervical, supraclavicular, axillary, or inguinal nodes   RESP: Lungs clear to auscultation bilaterally, respirations regular and easy  CARDIOVASCULAR: Regular rate and rhythm. Normal S1, S2; no murmur, gallop, or rub.  ABDOMEN: Soft, nontender. Bowel sounds auscultated all 4 quadrants. No palpable organomegaly or masses.  MUSCULOSKELETAL: Extremities without gross deformities noted. No edema of bilateral lower extremities.  NEURO: Alert and oriented x 3.  Gait steady.  PSYCHIATRIC: Mentation and affect appear normal.  Mood appropriate.    Laboratory:  Results for orders placed or performed in visit on 01/12/22   Comprehensive metabolic panel     Status: Abnormal   Result Value Ref Range    Sodium 138 133 - 144 mmol/L    Potassium 4.2 3.4 - 5.3 mmol/L    Chloride 109 94 - 109 mmol/L    Carbon Dioxide (CO2) 26 20 - 32 mmol/L    Anion Gap 3 3 - 14 mmol/L    Urea Nitrogen 23 7 - 30 mg/dL    Creatinine 0.50 (L) 0.52 - 1.04 mg/dL    Calcium 9.0 8.5 - 10.1 mg/dL    Glucose 96 70 - 99 mg/dL    Alkaline Phosphatase 74 40 - 150 U/L    AST 35 0 - 45 U/L    ALT 58 (H) 0 - 50 U/L    Protein Total 6.3 (L) 6.8 - 8.8 g/dL    Albumin 3.5 3.4 - 5.0 g/dL    Bilirubin Total 0.3 0.2 - 1.3 " mg/dL    GFR Estimate >90 >60 mL/min/1.73m2   CBC with platelets and differential     Status: Abnormal   Result Value Ref Range    WBC Count 3.3 (L) 4.0 - 11.0 10e3/uL    RBC Count 3.23 (L) 3.80 - 5.20 10e6/uL    Hemoglobin 10.4 (L) 11.7 - 15.7 g/dL    Hematocrit 32.9 (L) 35.0 - 47.0 %     (H) 78 - 100 fL    MCH 32.2 26.5 - 33.0 pg    MCHC 31.6 31.5 - 36.5 g/dL    RDW 17.1 (H) 10.0 - 15.0 %    Platelet Count 247 150 - 450 10e3/uL    % Neutrophils 48 %    % Lymphocytes 21 %    % Monocytes 24 %    % Eosinophils 3 %    % Basophils 3 %    % Immature Granulocytes 1 %    NRBCs per 100 WBC 0 <1 /100    Absolute Neutrophils 1.6 1.6 - 8.3 10e3/uL    Absolute Lymphocytes 0.7 (L) 0.8 - 5.3 10e3/uL    Absolute Monocytes 0.8 0.0 - 1.3 10e3/uL    Absolute Eosinophils 0.1 0.0 - 0.7 10e3/uL    Absolute Basophils 0.1 0.0 - 0.2 10e3/uL    Absolute Immature Granulocytes 0.0 <=0.4 10e3/uL    Absolute NRBCs 0.0 10e3/uL   RBC and Platelet Morphology     Status: Abnormal   Result Value Ref Range    Platelet Assessment  Automated Count Confirmed. Platelet morphology is normal.     Automated Count Confirmed. Platelet morphology is normal.    Teardrop Cells Slight (A) None Seen    RBC Morphology Confirmed RBC Indices    CBC with platelets differential     Status: Abnormal    Narrative    The following orders were created for panel order CBC with platelets differential.  Procedure                               Abnormality         Status                     ---------                               -----------         ------                     CBC with platelets and d...[[id number]]  Abnormal            Final result               RBC and Platelet Morphology[[id number]]  Abnormal            Final result                 Please view results for these tests on the individual orders.       Imaging Studies: None completed for today's visit      ASSESSMENT/PLAN:    #1 Breast cancer: Stage IIB invasive lobular carcinoma of the right breast  diagnosed 4/2021 and currently undergoing therapy with Taxol. She will receive cycle 7 therapy today and follow-up prior to cycle 10 with labs per treatment plan.    #2 anemia: Likely not related to her iron as previous iron test back in November were within normal limits other than a slightly elevated ferritin.  Likely related to chemotherapy.  We will repeat her ferritin today.    #3 family history of cancer: Referral was sent to cancer risk management per genetic counseling appointment.    #4  Elevated ALT: Likely etiology being the Taxol.  We will check her hepatic functions with each Taxol dose.     #5 neutropenia: ANC at 0.9 prior to cycle 7 therapy.  ANC parameter decreased to 1.2.    I encouraged patient to call with any questions or concerns.    45 minutes spent in the patient's encounter today with time spent in review of the chart along with in chart preparation.  Time was also spent in reviewing her treatment plan and signing her treatment plan and making adjustments in her ANC parameter.  Time was also spent obtaining a review of systems and performing a physical exam along with review of her lab work in detail with her.  We also spent time in discussion of current therapy and fatigue effects that she is feeling.  We discussed physical activity.  Time was also spent in placing future orders, placing a genetics referral and planning her next follow-up and in chart documentation.    Valentina Avila, NP  APRN, FNP-BC, AOCNP

## 2022-01-19 ENCOUNTER — INFUSION THERAPY VISIT (OUTPATIENT)
Dept: INFUSION THERAPY | Facility: OTHER | Age: 67
End: 2022-01-19
Attending: INTERNAL MEDICINE
Payer: MEDICARE

## 2022-01-19 VITALS
DIASTOLIC BLOOD PRESSURE: 79 MMHG | OXYGEN SATURATION: 99 % | HEART RATE: 76 BPM | SYSTOLIC BLOOD PRESSURE: 126 MMHG | HEIGHT: 63 IN | RESPIRATION RATE: 18 BRPM | WEIGHT: 120.59 LBS | TEMPERATURE: 97.9 F | BODY MASS INDEX: 21.37 KG/M2

## 2022-01-19 DIAGNOSIS — C50.511 MALIGNANT NEOPLASM OF LOWER-OUTER QUADRANT OF RIGHT BREAST OF FEMALE, ESTROGEN RECEPTOR POSITIVE (H): ICD-10-CM

## 2022-01-19 DIAGNOSIS — T45.1X5A CHEMOTHERAPY-INDUCED NEUTROPENIA (H): Primary | ICD-10-CM

## 2022-01-19 DIAGNOSIS — Z17.0 MALIGNANT NEOPLASM OF LOWER-OUTER QUADRANT OF RIGHT BREAST OF FEMALE, ESTROGEN RECEPTOR POSITIVE (H): ICD-10-CM

## 2022-01-19 DIAGNOSIS — D70.1 CHEMOTHERAPY-INDUCED NEUTROPENIA (H): Primary | ICD-10-CM

## 2022-01-19 LAB
ALBUMIN SERPL-MCNC: 3.2 G/DL (ref 3.4–5)
ALP SERPL-CCNC: 65 U/L (ref 40–150)
ALT SERPL W P-5'-P-CCNC: 54 U/L (ref 0–50)
AST SERPL W P-5'-P-CCNC: 33 U/L (ref 0–45)
BASOPHILS # BLD AUTO: 0.1 10E3/UL (ref 0–0.2)
BASOPHILS NFR BLD AUTO: 3 %
BILIRUB DIRECT SERPL-MCNC: <0.1 MG/DL (ref 0–0.2)
BILIRUB SERPL-MCNC: 0.3 MG/DL (ref 0.2–1.3)
EOSINOPHIL # BLD AUTO: 0.1 10E3/UL (ref 0–0.7)
EOSINOPHIL NFR BLD AUTO: 4 %
ERYTHROCYTE [DISTWIDTH] IN BLOOD BY AUTOMATED COUNT: 15.7 % (ref 10–15)
HCT VFR BLD AUTO: 33.6 % (ref 35–47)
HGB BLD-MCNC: 10.7 G/DL (ref 11.7–15.7)
IMM GRANULOCYTES # BLD: 0 10E3/UL
IMM GRANULOCYTES NFR BLD: 1 %
LYMPHOCYTES # BLD AUTO: 0.8 10E3/UL (ref 0.8–5.3)
LYMPHOCYTES NFR BLD AUTO: 24 %
MCH RBC QN AUTO: 32.5 PG (ref 26.5–33)
MCHC RBC AUTO-ENTMCNC: 31.8 G/DL (ref 31.5–36.5)
MCV RBC AUTO: 102 FL (ref 78–100)
MONOCYTES # BLD AUTO: 0.5 10E3/UL (ref 0–1.3)
MONOCYTES NFR BLD AUTO: 15 %
NEUTROPHILS # BLD AUTO: 1.8 10E3/UL (ref 1.6–8.3)
NEUTROPHILS NFR BLD AUTO: 53 %
NRBC # BLD AUTO: 0 10E3/UL
NRBC BLD AUTO-RTO: 0 /100
PLATELET # BLD AUTO: 228 10E3/UL (ref 150–450)
PROT SERPL-MCNC: 6.3 G/DL (ref 6.8–8.8)
RBC # BLD AUTO: 3.29 10E6/UL (ref 3.8–5.2)
WBC # BLD AUTO: 3.3 10E3/UL (ref 4–11)

## 2022-01-19 PROCEDURE — 258N000003 HC RX IP 258 OP 636: Performed by: NURSE PRACTITIONER

## 2022-01-19 PROCEDURE — 96367 TX/PROPH/DG ADDL SEQ IV INF: CPT

## 2022-01-19 PROCEDURE — 250N000011 HC RX IP 250 OP 636: Performed by: NURSE PRACTITIONER

## 2022-01-19 PROCEDURE — 36591 DRAW BLOOD OFF VENOUS DEVICE: CPT | Mod: ZL

## 2022-01-19 PROCEDURE — 85025 COMPLETE CBC W/AUTO DIFF WBC: CPT | Mod: ZL | Performed by: NURSE PRACTITIONER

## 2022-01-19 PROCEDURE — 80076 HEPATIC FUNCTION PANEL: CPT | Mod: ZL

## 2022-01-19 PROCEDURE — 96413 CHEMO IV INFUSION 1 HR: CPT

## 2022-01-19 RX ORDER — HEPARIN SODIUM (PORCINE) LOCK FLUSH IV SOLN 100 UNIT/ML 100 UNIT/ML
5 SOLUTION INTRAVENOUS
Status: DISCONTINUED | OUTPATIENT
Start: 2022-01-19 | End: 2022-01-19 | Stop reason: HOSPADM

## 2022-01-19 RX ADMIN — SODIUM CHLORIDE 250 ML: 9 INJECTION, SOLUTION INTRAVENOUS at 10:33

## 2022-01-19 RX ADMIN — PACLITAXEL 123 MG: 6 INJECTION, SOLUTION INTRAVENOUS at 11:03

## 2022-01-19 RX ADMIN — ONDANSETRON: 2 INJECTION INTRAMUSCULAR; INTRAVENOUS at 10:33

## 2022-01-19 RX ADMIN — HEPARIN 5 ML: 100 SYRINGE at 12:08

## 2022-01-19 ASSESSMENT — MIFFLIN-ST. JEOR: SCORE: 1061

## 2022-01-19 ASSESSMENT — PAIN SCALES - GENERAL: PAINLEVEL: NO PAIN (0)

## 2022-01-19 NOTE — PROGRESS NOTES
Patient is 66 years old, here accompanied by self today for infusion of Taxol under the orders of Dr. Rolle.  Power port accessed with 19 gauge 3/4 inch non-coring needle.  Line flushed with 10 cc's normal saline.  Needle secured with sterile transparent dressing.  10 cc's blood discarded, and blood taken for 2 tubes of ordered labs.  Patient tolerated well.  Denies pain and discomfort at this time.  Port flushes easily without resistance.    Hand hygiene performed: yes   Mask donned by caregiver: yes Site prepped with CHG: yes Labs drawn: yes Dressing applied using aseptic technique: yes     Component      Latest Ref Rng & Units 1/19/2022   WBC      4.0 - 11.0 10e3/uL 3.3 (L)   RBC Count      3.80 - 5.20 10e6/uL 3.29 (L)   Hemoglobin      11.7 - 15.7 g/dL 10.7 (L)   Hematocrit      35.0 - 47.0 % 33.6 (L)   MCV      78 - 100 fL 102 (H)   MCH      26.5 - 33.0 pg 32.5   MCHC      31.5 - 36.5 g/dL 31.8   RDW      10.0 - 15.0 % 15.7 (H)   Platelet Count      150 - 450 10e3/uL 228   % Neutrophils      % 53   % Lymphocytes      % 24   % Monocytes      % 15   % Eosinophils      % 4   % Basophils      % 3   % Immature Granulocytes      % 1   NRBCs per 100 WBC      <1 /100 0   Absolute Neutrophils      1.6 - 8.3 10e3/uL 1.8   Absolute Lymphocytes      0.8 - 5.3 10e3/uL 0.8   Absolute Monocytes      0.0 - 1.3 10e3/uL 0.5   Absolute Eosinophils      0.0 - 0.7 10e3/uL 0.1   Absolute Basophils      0.0 - 0.2 10e3/uL 0.1   Absolute Immature Granulocytes      <=0.4 10e3/uL 0.0   Absolute NRBCs      10e3/uL 0.0   Sodium      133 - 144 mmol/L    Potassium      3.4 - 5.3 mmol/L    Chloride      94 - 109 mmol/L    Carbon Dioxide      20 - 32 mmol/L    Anion Gap      3 - 14 mmol/L    Urea Nitrogen      7 - 30 mg/dL    Creatinine      0.52 - 1.04 mg/dL    Calcium      8.5 - 10.1 mg/dL    Glucose      70 - 99 mg/dL    Alkaline Phosphatase      40 - 150 U/L 65   AST      0 - 45 U/L 33   ALT      0 - 50 U/L 54 (H)   Protein Total       6.8 - 8.8 g/dL 6.3 (L)   Albumin      3.4 - 5.0 g/dL 3.2 (L)   Bilirubin Total      0.2 - 1.3 mg/dL 0.3   GFR Estimate      >60 mL/min/1.73m2    Bilirubin Direct      0.0 - 0.2 mg/dL <0.1       Patient meets parameters for today's infusion.  Denies questions or concerns regarding today's infusion and/or medications being administered.      Independent dose check completed with SHARRI Ordaz.    Patient identified with two identifiers, order verified, and verbal consent for today's infusion obtained from patient.     1103 IV pump verified with Taxol dose, drug, and rate of administration. Infusion administered per protocol. Patient tolerated infusion well, no signs or symptoms of adverse reaction noted. Patient denies pain nor discomfort.     Needle removed, tip intact. Site clean, dry and intact. Covered with a sterile bandage, slight pressure applied for 30 seconds. Pt instructed to leave bandage intact for a minimum of one hour, and to call with questions or concerns. Patient states understanding, discharged.

## 2022-01-26 ENCOUNTER — INFUSION THERAPY VISIT (OUTPATIENT)
Dept: INFUSION THERAPY | Facility: OTHER | Age: 67
End: 2022-01-26
Attending: INTERNAL MEDICINE
Payer: MEDICARE

## 2022-01-26 VITALS
SYSTOLIC BLOOD PRESSURE: 122 MMHG | TEMPERATURE: 98 F | OXYGEN SATURATION: 98 % | BODY MASS INDEX: 21.8 KG/M2 | WEIGHT: 123.02 LBS | HEART RATE: 69 BPM | RESPIRATION RATE: 16 BRPM | DIASTOLIC BLOOD PRESSURE: 72 MMHG

## 2022-01-26 DIAGNOSIS — T45.1X5A CHEMOTHERAPY-INDUCED NEUTROPENIA (H): Primary | ICD-10-CM

## 2022-01-26 DIAGNOSIS — C50.511 MALIGNANT NEOPLASM OF LOWER-OUTER QUADRANT OF RIGHT BREAST OF FEMALE, ESTROGEN RECEPTOR POSITIVE (H): ICD-10-CM

## 2022-01-26 DIAGNOSIS — Z17.0 MALIGNANT NEOPLASM OF LOWER-OUTER QUADRANT OF RIGHT BREAST OF FEMALE, ESTROGEN RECEPTOR POSITIVE (H): ICD-10-CM

## 2022-01-26 DIAGNOSIS — D70.1 CHEMOTHERAPY-INDUCED NEUTROPENIA (H): Primary | ICD-10-CM

## 2022-01-26 LAB
ALBUMIN SERPL-MCNC: 3.3 G/DL (ref 3.4–5)
ALP SERPL-CCNC: 63 U/L (ref 40–150)
ALT SERPL W P-5'-P-CCNC: 50 U/L (ref 0–50)
AST SERPL W P-5'-P-CCNC: 31 U/L (ref 0–45)
BASOPHILS # BLD AUTO: 0.1 10E3/UL (ref 0–0.2)
BASOPHILS NFR BLD AUTO: 3 %
BILIRUB DIRECT SERPL-MCNC: 0.1 MG/DL (ref 0–0.2)
BILIRUB SERPL-MCNC: 0.3 MG/DL (ref 0.2–1.3)
EOSINOPHIL # BLD AUTO: 0.1 10E3/UL (ref 0–0.7)
EOSINOPHIL NFR BLD AUTO: 5 %
ERYTHROCYTE [DISTWIDTH] IN BLOOD BY AUTOMATED COUNT: 15 % (ref 10–15)
HCT VFR BLD AUTO: 32 % (ref 35–47)
HGB BLD-MCNC: 10.3 G/DL (ref 11.7–15.7)
IMM GRANULOCYTES # BLD: 0 10E3/UL
IMM GRANULOCYTES NFR BLD: 0 %
LYMPHOCYTES # BLD AUTO: 0.7 10E3/UL (ref 0.8–5.3)
LYMPHOCYTES NFR BLD AUTO: 24 %
MCH RBC QN AUTO: 32.7 PG (ref 26.5–33)
MCHC RBC AUTO-ENTMCNC: 32.2 G/DL (ref 31.5–36.5)
MCV RBC AUTO: 102 FL (ref 78–100)
MONOCYTES # BLD AUTO: 0.4 10E3/UL (ref 0–1.3)
MONOCYTES NFR BLD AUTO: 14 %
NEUTROPHILS # BLD AUTO: 1.5 10E3/UL (ref 1.6–8.3)
NEUTROPHILS NFR BLD AUTO: 54 %
NRBC # BLD AUTO: 0 10E3/UL
NRBC BLD AUTO-RTO: 0 /100
PLATELET # BLD AUTO: 202 10E3/UL (ref 150–450)
PROT SERPL-MCNC: 6.2 G/DL (ref 6.8–8.8)
RBC # BLD AUTO: 3.15 10E6/UL (ref 3.8–5.2)
WBC # BLD AUTO: 2.7 10E3/UL (ref 4–11)

## 2022-01-26 PROCEDURE — 250N000011 HC RX IP 250 OP 636: Performed by: NURSE PRACTITIONER

## 2022-01-26 PROCEDURE — 36591 DRAW BLOOD OFF VENOUS DEVICE: CPT | Mod: ZL

## 2022-01-26 PROCEDURE — 96367 TX/PROPH/DG ADDL SEQ IV INF: CPT

## 2022-01-26 PROCEDURE — 85025 COMPLETE CBC W/AUTO DIFF WBC: CPT | Mod: ZL | Performed by: NURSE PRACTITIONER

## 2022-01-26 PROCEDURE — 82040 ASSAY OF SERUM ALBUMIN: CPT | Mod: ZL

## 2022-01-26 PROCEDURE — 96413 CHEMO IV INFUSION 1 HR: CPT

## 2022-01-26 PROCEDURE — 258N000003 HC RX IP 258 OP 636: Performed by: NURSE PRACTITIONER

## 2022-01-26 RX ORDER — HEPARIN SODIUM (PORCINE) LOCK FLUSH IV SOLN 100 UNIT/ML 100 UNIT/ML
5 SOLUTION INTRAVENOUS
Status: DISCONTINUED | OUTPATIENT
Start: 2022-01-26 | End: 2022-01-26 | Stop reason: HOSPADM

## 2022-01-26 RX ADMIN — PACLITAXEL 123 MG: 6 INJECTION, SOLUTION INTRAVENOUS at 11:00

## 2022-01-26 RX ADMIN — Medication 5 ML: at 12:12

## 2022-01-26 RX ADMIN — SODIUM CHLORIDE 250 ML: 9 INJECTION, SOLUTION INTRAVENOUS at 10:21

## 2022-01-26 RX ADMIN — ONDANSETRON: 2 INJECTION INTRAMUSCULAR; INTRAVENOUS at 10:22

## 2022-01-26 ASSESSMENT — PAIN SCALES - GENERAL: PAINLEVEL: NO PAIN (0)

## 2022-01-26 NOTE — PROGRESS NOTES
Patient is a 65 y/o here accompanied by self today for infusion of taxol per order and under the supervision of Dr. Rolle.  Patient identified with two identifiers, order verified, and verbal consent for today's infusion obtained from patient.        Lab values:     Component      Latest Ref Rng & Units 1/26/2022   WBC      4.0 - 11.0 10e3/uL 2.7 (L)   RBC Count      3.80 - 5.20 10e6/uL 3.15 (L)   Hemoglobin      11.7 - 15.7 g/dL 10.3 (L)   Hematocrit      35.0 - 47.0 % 32.0 (L)   MCV      78 - 100 fL 102 (H)   MCH      26.5 - 33.0 pg 32.7   MCHC      31.5 - 36.5 g/dL 32.2   RDW      10.0 - 15.0 % 15.0   Platelet Count      150 - 450 10e3/uL 202   % Neutrophils      % 54   % Lymphocytes      % 24   % Monocytes      % 14   % Eosinophils      % 5   % Basophils      % 3   % Immature Granulocytes      % 0   NRBCs per 100 WBC      <1 /100 0   Absolute Neutrophils      1.6 - 8.3 10e3/uL 1.5 (L)   Absolute Lymphocytes      0.8 - 5.3 10e3/uL 0.7 (L)   Absolute Monocytes      0.0 - 1.3 10e3/uL 0.4   Absolute Eosinophils      0.0 - 0.7 10e3/uL 0.1   Absolute Basophils      0.0 - 0.2 10e3/uL 0.1   Absolute Immature Granulocytes      <=0.4 10e3/uL 0.0   Absolute NRBCs      10e3/uL 0.0   Bilirubin Total      0.2 - 1.3 mg/dL 0.3   Bilirubin Direct      0.0 - 0.2 mg/dL 0.1   Protein Total      6.8 - 8.8 g/dL 6.2 (L)   Albumin      3.4 - 5.0 g/dL 3.3 (L)   Alkaline Phosphatase      40 - 150 U/L 63   AST      0 - 45 U/L 31   ALT      0 - 50 U/L 50       Patient meets order parameters for today's treatment.      Independent dose check completed with SHARRI Alexander.     1100 IV pump verified with taxol dose, drug, and rate of administration.  Infusion administered per protocol.  Patient tolerated infusion well, no signs or symptoms of adverse reaction noted.  Patient denies pain nor discomfort.     Port needle removed intact, sterile dressing applied.  Slight pressure applied for 30 seconds.   Patient tolerated port flush well, denies  pain nor discomfort at this time. Patient instructed to leave dressing intact for a minimum of one hour, and to call with questions or concerns.  Patient states understanding and is in agreement with this plan. Patient discharged.

## 2022-01-26 NOTE — PROGRESS NOTES
Patients port accessed using non-coring, 19 gauge, 3/4 inch needle, per facility protocol.     Hand hygiene performed: yes   Mask donned by caregiver: yes  Site prepped with CHG: yes   Labs drawn: yes   Dressing applied using aseptic technique: yes     Port flushed easily, without resistance. Flushed with 10 cc's normal saline.   Immediate blood return noted. 10 cc blood discarded.  2 vials blood drawn and sent to lab for results.  Port flushed with 20 cc 0.9% normal saline. Left accessed for pending infusion.

## 2022-02-02 ENCOUNTER — LAB (OUTPATIENT)
Dept: INFUSION THERAPY | Facility: OTHER | Age: 67
End: 2022-02-02
Attending: INTERNAL MEDICINE
Payer: MEDICARE

## 2022-02-02 ENCOUNTER — ONCOLOGY VISIT (OUTPATIENT)
Dept: ONCOLOGY | Facility: OTHER | Age: 67
End: 2022-02-02
Attending: NURSE PRACTITIONER
Payer: MEDICARE

## 2022-02-02 VITALS
WEIGHT: 121.03 LBS | BODY MASS INDEX: 21.45 KG/M2 | SYSTOLIC BLOOD PRESSURE: 119 MMHG | OXYGEN SATURATION: 100 % | HEART RATE: 82 BPM | DIASTOLIC BLOOD PRESSURE: 73 MMHG | RESPIRATION RATE: 20 BRPM | HEIGHT: 63 IN | TEMPERATURE: 97.8 F

## 2022-02-02 VITALS — HEART RATE: 73 BPM | SYSTOLIC BLOOD PRESSURE: 121 MMHG | DIASTOLIC BLOOD PRESSURE: 76 MMHG

## 2022-02-02 DIAGNOSIS — C50.511 MALIGNANT NEOPLASM OF LOWER-OUTER QUADRANT OF RIGHT BREAST OF FEMALE, ESTROGEN RECEPTOR POSITIVE (H): ICD-10-CM

## 2022-02-02 DIAGNOSIS — D70.1 CHEMOTHERAPY-INDUCED NEUTROPENIA (H): Primary | ICD-10-CM

## 2022-02-02 DIAGNOSIS — Z79.899 ENCOUNTER FOR MONITORING CARDIOTOXIC DRUG THERAPY: ICD-10-CM

## 2022-02-02 DIAGNOSIS — D70.1 CHEMOTHERAPY-INDUCED NEUTROPENIA (H): ICD-10-CM

## 2022-02-02 DIAGNOSIS — Z17.0 MALIGNANT NEOPLASM OF LOWER-OUTER QUADRANT OF RIGHT BREAST OF FEMALE, ESTROGEN RECEPTOR POSITIVE (H): Primary | ICD-10-CM

## 2022-02-02 DIAGNOSIS — Z51.81 ENCOUNTER FOR MONITORING CARDIOTOXIC DRUG THERAPY: ICD-10-CM

## 2022-02-02 DIAGNOSIS — T45.1X5A CHEMOTHERAPY-INDUCED NEUTROPENIA (H): ICD-10-CM

## 2022-02-02 DIAGNOSIS — C50.511 MALIGNANT NEOPLASM OF LOWER-OUTER QUADRANT OF RIGHT BREAST OF FEMALE, ESTROGEN RECEPTOR POSITIVE (H): Primary | ICD-10-CM

## 2022-02-02 DIAGNOSIS — Z17.0 MALIGNANT NEOPLASM OF LOWER-OUTER QUADRANT OF RIGHT BREAST OF FEMALE, ESTROGEN RECEPTOR POSITIVE (H): ICD-10-CM

## 2022-02-02 DIAGNOSIS — R74.01 ELEVATED ALT MEASUREMENT: ICD-10-CM

## 2022-02-02 DIAGNOSIS — R53.83 FATIGUE, UNSPECIFIED TYPE: ICD-10-CM

## 2022-02-02 DIAGNOSIS — T45.1X5A CHEMOTHERAPY-INDUCED NEUTROPENIA (H): Primary | ICD-10-CM

## 2022-02-02 LAB
ALBUMIN SERPL-MCNC: 3.3 G/DL (ref 3.4–5)
ALP SERPL-CCNC: 58 U/L (ref 40–150)
ALT SERPL W P-5'-P-CCNC: 54 U/L (ref 0–50)
ANION GAP SERPL CALCULATED.3IONS-SCNC: 3 MMOL/L (ref 3–14)
AST SERPL W P-5'-P-CCNC: 36 U/L (ref 0–45)
BASOPHILS # BLD AUTO: 0.1 10E3/UL (ref 0–0.2)
BASOPHILS NFR BLD AUTO: 3 %
BILIRUB SERPL-MCNC: 0.4 MG/DL (ref 0.2–1.3)
BUN SERPL-MCNC: 19 MG/DL (ref 7–30)
CALCIUM SERPL-MCNC: 8.9 MG/DL (ref 8.5–10.1)
CHLORIDE BLD-SCNC: 110 MMOL/L (ref 94–109)
CO2 SERPL-SCNC: 26 MMOL/L (ref 20–32)
CREAT SERPL-MCNC: 0.57 MG/DL (ref 0.52–1.04)
EOSINOPHIL # BLD AUTO: 0.1 10E3/UL (ref 0–0.7)
EOSINOPHIL NFR BLD AUTO: 3 %
ERYTHROCYTE [DISTWIDTH] IN BLOOD BY AUTOMATED COUNT: 14.4 % (ref 10–15)
GFR SERPL CREATININE-BSD FRML MDRD: >90 ML/MIN/1.73M2
GLUCOSE BLD-MCNC: 89 MG/DL (ref 70–99)
HCT VFR BLD AUTO: 32.5 % (ref 35–47)
HGB BLD-MCNC: 10.6 G/DL (ref 11.7–15.7)
IMM GRANULOCYTES # BLD: 0 10E3/UL
IMM GRANULOCYTES NFR BLD: 1 %
LYMPHOCYTES # BLD AUTO: 0.8 10E3/UL (ref 0.8–5.3)
LYMPHOCYTES NFR BLD AUTO: 30 %
MCH RBC QN AUTO: 33 PG (ref 26.5–33)
MCHC RBC AUTO-ENTMCNC: 32.6 G/DL (ref 31.5–36.5)
MCV RBC AUTO: 101 FL (ref 78–100)
MONOCYTES # BLD AUTO: 0.4 10E3/UL (ref 0–1.3)
MONOCYTES NFR BLD AUTO: 13 %
NEUTROPHILS # BLD AUTO: 1.4 10E3/UL (ref 1.6–8.3)
NEUTROPHILS NFR BLD AUTO: 50 %
NRBC # BLD AUTO: 0 10E3/UL
NRBC BLD AUTO-RTO: 0 /100
PLATELET # BLD AUTO: 215 10E3/UL (ref 150–450)
POTASSIUM BLD-SCNC: 4 MMOL/L (ref 3.4–5.3)
PROT SERPL-MCNC: 6.2 G/DL (ref 6.8–8.8)
RBC # BLD AUTO: 3.21 10E6/UL (ref 3.8–5.2)
SODIUM SERPL-SCNC: 139 MMOL/L (ref 133–144)
WBC # BLD AUTO: 2.8 10E3/UL (ref 4–11)

## 2022-02-02 PROCEDURE — 96375 TX/PRO/DX INJ NEW DRUG ADDON: CPT

## 2022-02-02 PROCEDURE — G0463 HOSPITAL OUTPT CLINIC VISIT: HCPCS

## 2022-02-02 PROCEDURE — 96413 CHEMO IV INFUSION 1 HR: CPT

## 2022-02-02 PROCEDURE — 85025 COMPLETE CBC W/AUTO DIFF WBC: CPT | Mod: ZL | Performed by: NURSE PRACTITIONER

## 2022-02-02 PROCEDURE — 96374 THER/PROPH/DIAG INJ IV PUSH: CPT

## 2022-02-02 PROCEDURE — 258N000003 HC RX IP 258 OP 636: Performed by: NURSE PRACTITIONER

## 2022-02-02 PROCEDURE — 250N000011 HC RX IP 250 OP 636: Performed by: NURSE PRACTITIONER

## 2022-02-02 PROCEDURE — 80053 COMPREHEN METABOLIC PANEL: CPT | Mod: ZL

## 2022-02-02 PROCEDURE — G0463 HOSPITAL OUTPT CLINIC VISIT: HCPCS | Mod: 25

## 2022-02-02 PROCEDURE — 36591 DRAW BLOOD OFF VENOUS DEVICE: CPT | Mod: ZL

## 2022-02-02 PROCEDURE — 99214 OFFICE O/P EST MOD 30 MIN: CPT | Performed by: NURSE PRACTITIONER

## 2022-02-02 RX ORDER — LORAZEPAM 2 MG/ML
0.5 INJECTION INTRAMUSCULAR EVERY 4 HOURS PRN
Status: CANCELLED | OUTPATIENT
Start: 2022-02-18

## 2022-02-02 RX ORDER — NALOXONE HYDROCHLORIDE 0.4 MG/ML
0.2 INJECTION, SOLUTION INTRAMUSCULAR; INTRAVENOUS; SUBCUTANEOUS
Status: CANCELLED | OUTPATIENT
Start: 2022-02-09

## 2022-02-02 RX ORDER — ALBUTEROL SULFATE 90 UG/1
1-2 AEROSOL, METERED RESPIRATORY (INHALATION)
Status: CANCELLED
Start: 2022-02-09

## 2022-02-02 RX ORDER — HEPARIN SODIUM (PORCINE) LOCK FLUSH IV SOLN 100 UNIT/ML 100 UNIT/ML
5 SOLUTION INTRAVENOUS
Status: CANCELLED | OUTPATIENT
Start: 2022-02-09

## 2022-02-02 RX ORDER — ALBUTEROL SULFATE 0.83 MG/ML
2.5 SOLUTION RESPIRATORY (INHALATION)
Status: CANCELLED | OUTPATIENT
Start: 2022-02-18

## 2022-02-02 RX ORDER — MEPERIDINE HYDROCHLORIDE 25 MG/ML
25 INJECTION INTRAMUSCULAR; INTRAVENOUS; SUBCUTANEOUS EVERY 30 MIN PRN
Status: CANCELLED | OUTPATIENT
Start: 2022-02-09

## 2022-02-02 RX ORDER — LORAZEPAM 2 MG/ML
0.5 INJECTION INTRAMUSCULAR EVERY 4 HOURS PRN
Status: CANCELLED | OUTPATIENT
Start: 2022-02-25

## 2022-02-02 RX ORDER — DIPHENHYDRAMINE HCL 50 MG
50 CAPSULE ORAL
Status: CANCELLED
Start: 2022-02-25

## 2022-02-02 RX ORDER — EPINEPHRINE 1 MG/ML
0.3 INJECTION, SOLUTION, CONCENTRATE INTRAVENOUS EVERY 5 MIN PRN
Status: CANCELLED | OUTPATIENT
Start: 2022-02-25

## 2022-02-02 RX ORDER — DIPHENHYDRAMINE HCL 50 MG
50 CAPSULE ORAL
Status: CANCELLED
Start: 2022-02-09

## 2022-02-02 RX ORDER — HEPARIN SODIUM (PORCINE) LOCK FLUSH IV SOLN 100 UNIT/ML 100 UNIT/ML
5 SOLUTION INTRAVENOUS
Status: DISCONTINUED | OUTPATIENT
Start: 2022-02-02 | End: 2022-02-02 | Stop reason: HOSPADM

## 2022-02-02 RX ORDER — ALBUTEROL SULFATE 0.83 MG/ML
2.5 SOLUTION RESPIRATORY (INHALATION)
Status: CANCELLED | OUTPATIENT
Start: 2022-02-09

## 2022-02-02 RX ORDER — NALOXONE HYDROCHLORIDE 0.4 MG/ML
0.2 INJECTION, SOLUTION INTRAMUSCULAR; INTRAVENOUS; SUBCUTANEOUS
Status: CANCELLED | OUTPATIENT
Start: 2022-02-18

## 2022-02-02 RX ORDER — METHYLPREDNISOLONE SODIUM SUCCINATE 125 MG/2ML
125 INJECTION, POWDER, LYOPHILIZED, FOR SOLUTION INTRAMUSCULAR; INTRAVENOUS
Status: CANCELLED
Start: 2022-02-09

## 2022-02-02 RX ORDER — ALBUTEROL SULFATE 0.83 MG/ML
2.5 SOLUTION RESPIRATORY (INHALATION)
Status: CANCELLED | OUTPATIENT
Start: 2022-02-25

## 2022-02-02 RX ORDER — DIPHENHYDRAMINE HYDROCHLORIDE 50 MG/ML
50 INJECTION INTRAMUSCULAR; INTRAVENOUS
Status: CANCELLED
Start: 2022-02-25

## 2022-02-02 RX ORDER — HEPARIN SODIUM,PORCINE 10 UNIT/ML
5 VIAL (ML) INTRAVENOUS
Status: CANCELLED | OUTPATIENT
Start: 2022-02-18

## 2022-02-02 RX ORDER — ALBUTEROL SULFATE 90 UG/1
1-2 AEROSOL, METERED RESPIRATORY (INHALATION)
Status: CANCELLED
Start: 2022-02-18

## 2022-02-02 RX ORDER — HEPARIN SODIUM,PORCINE 10 UNIT/ML
5 VIAL (ML) INTRAVENOUS
Status: CANCELLED | OUTPATIENT
Start: 2022-02-25

## 2022-02-02 RX ORDER — MEPERIDINE HYDROCHLORIDE 25 MG/ML
25 INJECTION INTRAMUSCULAR; INTRAVENOUS; SUBCUTANEOUS EVERY 30 MIN PRN
Status: CANCELLED | OUTPATIENT
Start: 2022-02-25

## 2022-02-02 RX ORDER — NALOXONE HYDROCHLORIDE 0.4 MG/ML
0.2 INJECTION, SOLUTION INTRAMUSCULAR; INTRAVENOUS; SUBCUTANEOUS
Status: CANCELLED | OUTPATIENT
Start: 2022-02-25

## 2022-02-02 RX ORDER — LANOLIN ALCOHOL/MO/W.PET/CERES
1 CREAM (GRAM) TOPICAL
COMMUNITY
End: 2022-11-23

## 2022-02-02 RX ORDER — DIPHENHYDRAMINE HYDROCHLORIDE 50 MG/ML
50 INJECTION INTRAMUSCULAR; INTRAVENOUS
Status: CANCELLED
Start: 2022-02-18

## 2022-02-02 RX ORDER — HEPARIN SODIUM,PORCINE 10 UNIT/ML
5 VIAL (ML) INTRAVENOUS
Status: CANCELLED | OUTPATIENT
Start: 2022-02-09

## 2022-02-02 RX ORDER — METHYLPREDNISOLONE SODIUM SUCCINATE 125 MG/2ML
125 INJECTION, POWDER, LYOPHILIZED, FOR SOLUTION INTRAMUSCULAR; INTRAVENOUS
Status: CANCELLED
Start: 2022-02-25

## 2022-02-02 RX ORDER — MEPERIDINE HYDROCHLORIDE 25 MG/ML
25 INJECTION INTRAMUSCULAR; INTRAVENOUS; SUBCUTANEOUS EVERY 30 MIN PRN
Status: CANCELLED | OUTPATIENT
Start: 2022-02-18

## 2022-02-02 RX ORDER — DIPHENHYDRAMINE HCL 50 MG
50 CAPSULE ORAL
Status: CANCELLED
Start: 2022-02-18

## 2022-02-02 RX ORDER — DIPHENHYDRAMINE HYDROCHLORIDE 50 MG/ML
50 INJECTION INTRAMUSCULAR; INTRAVENOUS
Status: CANCELLED
Start: 2022-02-09

## 2022-02-02 RX ORDER — ALBUTEROL SULFATE 90 UG/1
1-2 AEROSOL, METERED RESPIRATORY (INHALATION)
Status: CANCELLED
Start: 2022-02-25

## 2022-02-02 RX ORDER — METHYLPREDNISOLONE SODIUM SUCCINATE 125 MG/2ML
125 INJECTION, POWDER, LYOPHILIZED, FOR SOLUTION INTRAMUSCULAR; INTRAVENOUS
Status: CANCELLED
Start: 2022-02-18

## 2022-02-02 RX ORDER — HEPARIN SODIUM (PORCINE) LOCK FLUSH IV SOLN 100 UNIT/ML 100 UNIT/ML
5 SOLUTION INTRAVENOUS
Status: CANCELLED | OUTPATIENT
Start: 2022-02-18

## 2022-02-02 RX ORDER — LORAZEPAM 2 MG/ML
0.5 INJECTION INTRAMUSCULAR EVERY 4 HOURS PRN
Status: CANCELLED | OUTPATIENT
Start: 2022-02-09

## 2022-02-02 RX ORDER — EPINEPHRINE 1 MG/ML
0.3 INJECTION, SOLUTION, CONCENTRATE INTRAVENOUS EVERY 5 MIN PRN
Status: CANCELLED | OUTPATIENT
Start: 2022-02-09

## 2022-02-02 RX ORDER — EPINEPHRINE 1 MG/ML
0.3 INJECTION, SOLUTION, CONCENTRATE INTRAVENOUS EVERY 5 MIN PRN
Status: CANCELLED | OUTPATIENT
Start: 2022-02-18

## 2022-02-02 RX ORDER — HEPARIN SODIUM (PORCINE) LOCK FLUSH IV SOLN 100 UNIT/ML 100 UNIT/ML
5 SOLUTION INTRAVENOUS
Status: CANCELLED | OUTPATIENT
Start: 2022-02-25

## 2022-02-02 RX ADMIN — Medication 5 ML: at 12:23

## 2022-02-02 RX ADMIN — ONDANSETRON 8 MG: 2 INJECTION INTRAMUSCULAR; INTRAVENOUS at 10:43

## 2022-02-02 RX ADMIN — PACLITAXEL 123 MG: 6 INJECTION, SOLUTION INTRAVENOUS at 11:24

## 2022-02-02 ASSESSMENT — PAIN SCALES - GENERAL: PAINLEVEL: NO PAIN (0)

## 2022-02-02 ASSESSMENT — MIFFLIN-ST. JEOR: SCORE: 1063.13

## 2022-02-02 NOTE — NURSING NOTE
"Oncology Rooming Note    February 2, 2022 9:36 AM   Anne Marie Nix is a 66 year old female who presents for:    Chief Complaint   Patient presents with     Oncology Clinic Visit     Follow up Malignant neoplasm of lower-outer quadrant of right breast of female, estrogen receptor positive      Initial Vitals: /73   Pulse 82   Temp 97.8  F (36.6  C) (Tympanic)   Resp 20   Ht 1.6 m (5' 3\")   Wt 54.9 kg (121 lb 0.5 oz)   SpO2 100%   BMI 21.44 kg/m   Estimated body mass index is 21.44 kg/m  as calculated from the following:    Height as of this encounter: 1.6 m (5' 3\").    Weight as of this encounter: 54.9 kg (121 lb 0.5 oz). Body surface area is 1.56 meters squared.  No Pain (0) Comment: Data Unavailable   No LMP recorded. Patient is postmenopausal.  Allergies reviewed: Yes  Medications reviewed: Yes    Medications: Medication refills not needed today.  Pharmacy name entered into [facility]:    [id number] - [place], [place] - [address]  [id number] - [place], [place] - [address] [facility] [address]  [id number] - [place], [place] - [address] [facility] [address]  [id number] - [place], [place] - [address]          Gaby Drew LPN            "

## 2022-02-02 NOTE — PROGRESS NOTES
Patients port accessed using non-coring, 19 gauge, 3/4 needle, per facility protocol.     Hand hygiene performed: yes   Mask donned by caregiver: yes  Site prepped with CHG: yes   Labs drawn: yes   Dressing applied using aseptic technique: yes     Port flushed easily, without resistance. Flushed with 10 cc's normal saline.   Immediate blood return noted. 10 cc blood discarded.  2 vials blood draw and sent to lab for results.  Port flushed with 20 cc 0.9% normal saline and 5 cc heparinized saline.  Needle removed intact, sterile dressing applied.  Slight pressure applied for 30 seconds.   Patient tolerated port flush well, denies pain nor discomfort at this time. Patient instructed to leave dressing intact for a minimum of one hour, and to call with questions or concerns.  Patient states understanding and is in agreement with this plan. Patient discharged.

## 2022-02-02 NOTE — PROGRESS NOTES
Oncology Follow-up Visit:  February 2, 2022    Reason for Visit:  Patient presents with:  Oncology Clinic Visit: Follow up Malignant neoplasm of lower-outer quadrant of right breast of female, estrogen receptor positive      Nursing Note and documentation reviewed: yes    HPI:  This is a 66-year-old female patient who presents to the oncology clinic today for evaluation prior to receiving cycle 10 chemotherapy for stage IIB invasive lobular carcinoma of the right breast diagnosed 4/2021.  She completed the AC portion of her treatment on 12/8/2021 and is now receiving Taxol.     She presents to the clinic today stating she is doing okay.  She is dealing with some fatigue rating this at about a 4 on a 0-to-10 scale.  She does continue to walk almost on a daily basis.  She has some nausea off and on depending on what she eats.  She denies any changes to the breast tissue.  Her weight has been stable.  She will be seeing a genetic counselor on 4/4/2022 for consultation.    She continues to follow with a functional medicine provider.  She does bring some additional supplements that she is currently taking with her today questioning if these are okay to take with her therapy.    Oncologic History:     3/30/2021 patient palpated a mass in the right breast  4/9/2021 she underwent mammogram showing an abnormality in the lower outer aspect of the right breast  4/13/2021  She underwent ultrasound-guided biopsy with pathology showing invasive lobular carcinoma, grade 2, ER positive, ME negative, HER-2/kevin negative  7/9/2021  She underwent bilateral breast MRI showing Corresponding to the ultrasound finding in the right lower breast at about the 7 o'clock position an area of significant increased enhancement and mass extends for about 2.4 x 1.0 x 1.9 cm. This is larger than the ultrasound measurements.   8/23/2021 she underwent lumpectomy with sentinel nodes by Dr. Sofy Cobb with [facility] and pathology showed a 3  cm, grade 2 invasive lobular carcinoma with inferior lateral margin positive for invasive carcinoma; 3 sentinel nodes obtained, 1 with micrometastasis measuring 2 mm and 1 with macrometastasis and she was staged [id number]  9/15/2021 she was seen by Dr. Nunez Friday with Medical Oncology and felt the patient had an early stage breast cancer, stage II with positive margins and he recommended completion mastectomy and felt she would not likely benefit from additional axillary lymph node surgery; Oncotype DX resulted at 53 and he recommended 4 cycles of dose dense AC followed by 12 weeks of Taxol  9/27/2021  Echocardiogram showed an EF of 65%  10/11/2021  Patient was seen by Dr. Rolle with Medical Oncology at [facility] for a second opinion and he recommended a PET scan and MRI of the brain  10/11/2021  MRI of the brain was negative   10/13/2021  PET scan showed postop changes consistent with history of right breast lumpectomy and axillary node dissection and no evidence of metastatic disease  10/18/2021  She was seen again by Dr. Rolle and Staged IIB with recommendation was for AC x4 cycles followed by weekly Taxol x12  10/19/2021  Port-A-Cath placement by Dr. Chen  Echocardiogram  10/27/2021 cycle 1 Adriamycin/Cyclophosphamide  12/22/2021 initiation of Taxol      Genetics:  Referred/not completed     Current Chemo Regime/TX: Paclitaxel 80 mg per metered squared weekly  Current Cycle:  10   # of completed cycles: 9    **Third cycle of Taxol delayed related to ANC of 0.9/treatment parameter for ANC changed to 1.2 on 1/12/2022     Previous treatment:  Doxorubicin 60 mg per metered squared and cyclophosphamide 600 mg per metered squared given every 14 days with pegfilgrastim 6 mg subcutaneous day 2    Past Medical History:   Diagnosis Date     Acne vulgaris      Cancer ([facility])      Chemotherapy-induced neutropenia ([facility]) 10/25/2021     Complication of anesthesia     nauseated     Premenstrual tension syndromes  10/14/2004       Past Surgical History:   Procedure Laterality Date     INSERT PORT VASCULAR ACCESS N/A 10/19/2021    Procedure: port-a-cath placement;  Surgeon: Reginaldo Chen MD;  Location: [facility]     LUMPECTOMY BREAST Right 08/23/2021     ZZC OBSTETRICAL D&C         Family History   Problem Relation Age of Onset     Allergies Mother      Breast Cancer Mother      Diabetes Mother      Cardiovascular Paternal Grandfather      Allergies Paternal Grandfather      Angina Paternal Grandfather      Coronary Artery Disease Paternal Grandfather      Other - See Comments Father      Depression Maternal Grandmother      Cerebrovascular Disease Maternal Grandmother      Thyroid Disease Maternal Grandmother      Colon Cancer Maternal Grandfather      Diabetes Maternal Grandfather      Rheumatoid Arthritis Paternal Grandmother      Breast Cancer Cousin      Breast Cancer Maternal Aunt      Cancer Maternal Aunt         esphogeal, lung     Hypertension No family hx of      Hyperlipidemia No family hx of      Prostate Cancer No family hx of      Anesthesia Reaction No family hx of      Asthma No family hx of      Genetic Disorder No family hx of        Social History     Socioeconomic History     Marital status:      Spouse name: Not on file     Number of children: Not on file     Years of education: Not on file     Highest education level: Not on file   Occupational History     Not on file   Tobacco Use     Smoking status: Never Smoker     Smokeless tobacco: Never Used   Substance and Sexual Activity     Alcohol use: No     Drug use: No     Sexual activity: Not on file   Other Topics Concern     Parent/sibling w/ CABG, MI or angioplasty before 65F 55M? Yes   Social History Narrative     Not on file     Social Determinants of Health     Financial Resource Strain: Not on file   Food Insecurity: Not on file   Transportation Needs: Not on file   Physical Activity: Not on file   Stress: Not on file   Social Connections: Not  on file   Intimate Partner Violence: Not on file   Housing Stability: Not on file       Current Outpatient Medications   Medication     NONFORMULARY     NONFORMULARY     NONFORMULARY     NONFORMULARY     NONFORMULARY     NONFORMULARY     Ascorbic Acid (VITAMIN C) POWD     clindamycin-benzoyl peroxide (BENZACLIN) 1-5 % external gel     dapsone 5 % topical gel     Lysine 1000 MG TABS     methylphenidate (RITALIN) 10 MG tablet     mupirocin (BACTROBAN) 2 % external ointment     NONFORMULARY     OLANZapine (ZYPREXA) 10 MG tablet     ondansetron (ZOFRAN) 8 MG tablet     prochlorperazine (COMPAZINE) 10 MG tablet     pseudoePHEDrine (SUDAFED) 30 MG tablet     Turmeric Curcumin 500 MG CAPS     valACYclovir (VALTREX) 1000 mg tablet     vitamin B complex with vitamin C (VITAMIN  B COMPLEX) tablet     No current facility-administered medications for this visit.        Allergies   Allergen Reactions     Vancomycin Other (See Comments)     Legs went numb- side effect     Amoxicillin Unknown     Headache  Stomach aches  Augmentin     Amoxicillin Trihydrate Other (See Comments)     Headache  Stomach aches  Augmentin         Clavulanic Acid Potassium Other (See Comments)     Headaches  Stomach aches  Augmentin       Levaquin [Levofloxacin] Other (See Comments)     Numbness in leg       Review Of Systems:  Constitutional:    denies fever, weight changes, chills, and night sweats.  Eyes:    denies blurred or double vision  Ears/Nose/Throat:   denies ear pain, nose problems, difficulty swallowing  Respiratory:   denies shortness of breath, cough   Skin:   denies rash, lesions  Breast/Chest wall:   denies pain, lumps  Cardiovascular:   denies chest pain, palpitations, edema  Gastrointestinal:   denies abdominal pain, bloating, nausea, early satiety; no change in bowel habits or blood in stool  Genitourinary:   denies difficulty with urination, blood in urine  Musculoskeletal:    denies new muscle pain, bone pain  Neurologic:   denies  "lightheadedness, headaches, numbness or tingling  Psychiatric:   denies anxiety, depression  Hematologic/Lymphatic/Immunologic:   denies easy bruising, easy bleeding, lumps or bumps noted  Endocrine:   Denies increased thirst; has dry mouth      ECOG Performance Status: 1    Physical Exam:  /73   Pulse 82   Temp 97.8  F (36.6  C) (Tympanic)   Resp 20   Ht 1.6 m (5' 3\")   Wt 54.9 kg (121 lb 0.5 oz)   SpO2 100%   BMI 21.44 kg/m      GENERAL APPEARANCE: Healthy, alert and in no acute distress.  HEENT: Normocephalic, Sclerae anicteric.   NECK:   No asymmetry or masses, no thyromegaly.  LYMPHATICS: No palpable cervical, supraclavicular, axillary, or inguinal nodes   RESP: Lungs clear to auscultation bilaterally, respirations regular and easy  CARDIOVASCULAR: Regular rate and rhythm. Normal S1, S2; no murmur, gallop, or rub.  ABDOMEN: Soft, nontender. Bowel sounds auscultated all 4 quadrants. No palpable organomegaly or masses.  MUSCULOSKELETAL: Extremities without gross deformities noted. No edema of bilateral lower extremities.  NEURO: Alert and oriented x 3.  Gait steady.  PSYCHIATRIC: Mentation and affect appear normal.  Mood appropriate.    Laboratory:  Results for orders placed or performed in visit on 02/02/22   Comprehensive metabolic panel     Status: Abnormal   Result Value Ref Range    Sodium 139 133 - 144 mmol/L    Potassium 4.0 3.4 - 5.3 mmol/L    Chloride 110 (H) 94 - 109 mmol/L    Carbon Dioxide (CO2) 26 20 - 32 mmol/L    Anion Gap 3 3 - 14 mmol/L    Urea Nitrogen 19 7 - 30 mg/dL    Creatinine 0.57 0.52 - 1.04 mg/dL    Calcium 8.9 8.5 - 10.1 mg/dL    Glucose 89 70 - 99 mg/dL    Alkaline Phosphatase 58 40 - 150 U/L    AST 36 0 - 45 U/L    ALT 54 (H) 0 - 50 U/L    Protein Total 6.2 (L) 6.8 - 8.8 g/dL    Albumin 3.3 (L) 3.4 - 5.0 g/dL    Bilirubin Total 0.4 0.2 - 1.3 mg/dL    GFR Estimate >90 >60 mL/min/1.73m2   CBC with platelets and differential     Status: Abnormal   Result Value Ref Range    " WBC Count 2.8 (L) 4.0 - 11.0 10e3/uL    RBC Count 3.21 (L) 3.80 - 5.20 10e6/uL    Hemoglobin 10.6 (L) 11.7 - 15.7 g/dL    Hematocrit 32.5 (L) 35.0 - 47.0 %     (H) 78 - 100 fL    MCH 33.0 26.5 - 33.0 pg    MCHC 32.6 31.5 - 36.5 g/dL    RDW 14.4 10.0 - 15.0 %    Platelet Count 215 150 - 450 10e3/uL    % Neutrophils 50 %    % Lymphocytes 30 %    % Monocytes 13 %    % Eosinophils 3 %    % Basophils 3 %    % Immature Granulocytes 1 %    NRBCs per 100 WBC 0 <1 /100    Absolute Neutrophils 1.4 (L) 1.6 - 8.3 10e3/uL    Absolute Lymphocytes 0.8 0.8 - 5.3 10e3/uL    Absolute Monocytes 0.4 0.0 - 1.3 10e3/uL    Absolute Eosinophils 0.1 0.0 - 0.7 10e3/uL    Absolute Basophils 0.1 0.0 - 0.2 10e3/uL    Absolute Immature Granulocytes 0.0 <=0.4 10e3/uL    Absolute NRBCs 0.0 10e3/uL   CBC with platelets differential     Status: Abnormal    Narrative    The following orders were created for panel order CBC with platelets differential.  Procedure                               Abnormality         Status                     ---------                               -----------         ------                     CBC with platelets and d...[[id number]]  Abnormal            Final result                 Please view results for these tests on the individual orders.       Imaging Studies: None completed for today's visit      ASSESSMENT/PLAN:    #1 Breast cancer: Stage IIB invasive lobular carcinoma of the right breast diagnosed 4/2021 and currently undergoing therapy with Taxol.  She is tolerating fairly well.  Treatment has been fairly consistent now since decreasing ANC parameter to 1.2.  She will receive cycle 10 therapy today and follow-up prior to cycle 13 with labs per treatment plan.  She will be seeing genetics in April.    #2  Elevated ALT:  Stable and likely etiology being the Taxol.  We will check her hepatic functions with each Taxol dose.     #3 fatigue: Likely related to ongoing treatment and anemia.  We did discuss cancer  rehab and she will let me know if she would like referral.    #4 monitoring on cardiotoxic therapy: We will set her up for another echocardiogram.     I encouraged patient to call with any questions or concerns.    30 minutes spent in the patient's encounter today with time spent in review of the chart along with in chart preparation.  Time was also spent in review of her treatment plan and signing of her treatment plan.  Time was also spent obtaining a review of systems and performing a physical exam along with review of all of her lab work in detail with her.  Time was spent in discussing benefits of cancer rehab and physical activity.  Time was also spent in placing future orders and in chart documentation.    Valentina Avila NP  APRN, FNP-BC, AOCNP

## 2022-02-02 NOTE — PATIENT INSTRUCTIONS
We would like to see you back per your schedule.     When you are in need of a refill, please call your pharmacy and they will send us a request.     If you have any questions please call [phone]    Other instructions:  none

## 2022-02-02 NOTE — NURSING NOTE
"Oncology Rooming Note    February 2, 2022 9:21 AM   Anne Marie Nix is a 66 year old female who presents for:    Chief Complaint   Patient presents with     Oncology Clinic Visit     Follow up Malignant neoplasm of lower-outer quadrant of right breast of female, estrogen receptor positive      Initial Vitals: Pulse 82   Temp 97.8  F (36.6  C) (Tympanic)   Resp 20   Ht 1.6 m (5' 3\")   Wt 54.9 kg (121 lb 0.5 oz)   SpO2 100%   BMI 21.44 kg/m   Estimated body mass index is 21.44 kg/m  as calculated from the following:    Height as of this encounter: 1.6 m (5' 3\").    Weight as of this encounter: 54.9 kg (121 lb 0.5 oz). Body surface area is 1.56 meters squared.  No Pain (0) Comment: Data Unavailable   No LMP recorded. Patient is postmenopausal.  Allergies reviewed: Yes  Medications reviewed: Yes    Medications: Medication refills not needed today.  Pharmacy name entered into [facility]:    [facility] #[id number] - [place], [place] - [address]  [id number] - [place], [place] - [address] [facility] [address]  [facility] #[id number] - [place], [place] - [address] [facility] [address]  [facility] - [place], [place] - [address]          Gaby Drew LPN            "

## 2022-02-02 NOTE — PROGRESS NOTES
Patient is 66 years old, here accompanied by self today for infusion of Taxol under the orders of Dr. Rolle.  Power port accessed with 19 gauge 3/4 inch non-coring needle.  Line flushed with 10 cc's normal saline.  Needle secured with sterile transparent dressing.  10 cc's blood discarded, and blood taken for 2 tubes of ordered labs.  Patient tolerated well.  Denies pain and discomfort at this time.  Port flushes easily without resistance.    Hand hygiene performed: yes   Mask donned by caregiver: yes Site prepped with CHG: yes Labs drawn: yes Dressing applied using aseptic technique: yes     Patient lab values: reviewed by provider and patient qualifies for treatment.    Patient meets parameters for today's infusion.  Denies questions or concerns regarding today's infusion and/or medications being administered.      Independent dose check completed with Orlin HUANG RN.    Patient identified with two identifiers, order verified, and verbal consent for today's infusion obtained from patient.     1124  IV pump verified with taxol dose, drug, and rate of administration. Infusion administered per protocol. Patient tolerated infusion well, no signs or symptoms of adverse reaction noted. Patient denies pain nor discomfort.     Needle removed, tip intact. Site clean, dry and intact. Covered with a sterile bandage, slight pressure applied for 30 seconds. Pt instructed to leave bandage intact for a minimum of one hour, and to call with questions or concerns. Copy of appointments, discharge instructions, and after visit summary (AVS) provided to patient. Patient states understanding, discharged.

## 2022-02-08 RX ORDER — ONDANSETRON 2 MG/ML
8 INJECTION INTRAMUSCULAR; INTRAVENOUS ONCE
Status: CANCELLED
Start: 2022-02-09 | End: 2022-02-09

## 2022-02-09 ENCOUNTER — INFUSION THERAPY VISIT (OUTPATIENT)
Dept: INFUSION THERAPY | Facility: OTHER | Age: 67
End: 2022-02-09
Attending: INTERNAL MEDICINE
Payer: MEDICARE

## 2022-02-09 VITALS
SYSTOLIC BLOOD PRESSURE: 118 MMHG | DIASTOLIC BLOOD PRESSURE: 69 MMHG | RESPIRATION RATE: 17 BRPM | WEIGHT: 122.8 LBS | HEART RATE: 83 BPM | OXYGEN SATURATION: 96 % | BODY MASS INDEX: 21.75 KG/M2 | TEMPERATURE: 96.9 F

## 2022-02-09 DIAGNOSIS — Z17.0 MALIGNANT NEOPLASM OF LOWER-OUTER QUADRANT OF RIGHT BREAST OF FEMALE, ESTROGEN RECEPTOR POSITIVE (H): ICD-10-CM

## 2022-02-09 DIAGNOSIS — D70.1 CHEMOTHERAPY-INDUCED NEUTROPENIA (H): Primary | ICD-10-CM

## 2022-02-09 DIAGNOSIS — C50.511 MALIGNANT NEOPLASM OF LOWER-OUTER QUADRANT OF RIGHT BREAST OF FEMALE, ESTROGEN RECEPTOR POSITIVE (H): ICD-10-CM

## 2022-02-09 DIAGNOSIS — T45.1X5A CHEMOTHERAPY-INDUCED NEUTROPENIA (H): Primary | ICD-10-CM

## 2022-02-09 LAB
ALBUMIN SERPL-MCNC: 3.2 G/DL (ref 3.4–5)
ALP SERPL-CCNC: 56 U/L (ref 40–150)
ALT SERPL W P-5'-P-CCNC: 39 U/L (ref 0–50)
AST SERPL W P-5'-P-CCNC: 25 U/L (ref 0–45)
BASOPHILS # BLD MANUAL: 0 10E3/UL (ref 0–0.2)
BASOPHILS NFR BLD MANUAL: 1 %
BILIRUB DIRECT SERPL-MCNC: <0.1 MG/DL (ref 0–0.2)
BILIRUB SERPL-MCNC: 0.3 MG/DL (ref 0.2–1.3)
EOSINOPHIL # BLD MANUAL: 0 10E3/UL (ref 0–0.7)
EOSINOPHIL NFR BLD MANUAL: 2 %
ERYTHROCYTE [DISTWIDTH] IN BLOOD BY AUTOMATED COUNT: 13.8 % (ref 10–15)
HCT VFR BLD AUTO: 32 % (ref 35–47)
HGB BLD-MCNC: 10.2 G/DL (ref 11.7–15.7)
LYMPHOCYTES # BLD MANUAL: 0.9 10E3/UL (ref 0.8–5.3)
LYMPHOCYTES NFR BLD MANUAL: 43 %
MCH RBC QN AUTO: 32.2 PG (ref 26.5–33)
MCHC RBC AUTO-ENTMCNC: 31.9 G/DL (ref 31.5–36.5)
MCV RBC AUTO: 101 FL (ref 78–100)
MONOCYTES # BLD MANUAL: 0.2 10E3/UL (ref 0–1.3)
MONOCYTES NFR BLD MANUAL: 10 %
NEUTROPHILS # BLD MANUAL: 1 10E3/UL (ref 1.6–8.3)
NEUTROPHILS NFR BLD MANUAL: 44 %
PLAT MORPH BLD: ABNORMAL
PLATELET # BLD AUTO: 229 10E3/UL (ref 150–450)
PROT SERPL-MCNC: 6.1 G/DL (ref 6.8–8.8)
RBC # BLD AUTO: 3.17 10E6/UL (ref 3.8–5.2)
RBC MORPH BLD: ABNORMAL
WBC # BLD AUTO: 2.2 10E3/UL (ref 4–11)

## 2022-02-09 PROCEDURE — 80076 HEPATIC FUNCTION PANEL: CPT | Mod: ZL

## 2022-02-09 PROCEDURE — 250N000011 HC RX IP 250 OP 636: Performed by: INTERNAL MEDICINE

## 2022-02-09 PROCEDURE — 36591 DRAW BLOOD OFF VENOUS DEVICE: CPT | Mod: ZL

## 2022-02-09 PROCEDURE — 85014 HEMATOCRIT: CPT | Mod: ZL | Performed by: NURSE PRACTITIONER

## 2022-02-09 RX ORDER — HEPARIN SODIUM (PORCINE) LOCK FLUSH IV SOLN 100 UNIT/ML 100 UNIT/ML
5 SOLUTION INTRAVENOUS
Status: CANCELLED | OUTPATIENT
Start: 2022-02-09

## 2022-02-09 RX ORDER — HEPARIN SODIUM (PORCINE) LOCK FLUSH IV SOLN 100 UNIT/ML 100 UNIT/ML
5 SOLUTION INTRAVENOUS
Status: DISCONTINUED | OUTPATIENT
Start: 2022-02-09 | End: 2022-02-09 | Stop reason: HOSPADM

## 2022-02-09 RX ADMIN — Medication 5 ML: at 10:35

## 2022-02-09 ASSESSMENT — PAIN SCALES - GENERAL: PAINLEVEL: NO PAIN (0)

## 2022-02-09 NOTE — PROGRESS NOTES
Patient here for treatment today, Patient is 66 years old, here accompanied by self today for infusion of  Taxol under the orders of Dr. Rolle.  Power port accessed with 19 gauge 3/4 inch non-coring needle.  Line flushed with 10 cc's normal saline.  Needle secured with sterile transparent dressing.  10 cc's blood discarded, and blood taken for 2 tubes of ordered labs.  Patient tolerated well.  Denies pain and discomfort at this time.  Port flushes easily without resistance.    Hand hygiene performed: yes   Mask donned by caregiver: yes Site prepped with CHG: yes Labs drawn: yes Dressing applied using aseptic technique: yes     Patient lab values Please see providers notes.    Needle removed, tip intact. Site clean, dry and intact. Covered with a sterile bandage, slight pressure applied for 30 seconds. Pt instructed to leave bandage intact for a minimum of one hour, and to call with questions or concerns. Copy of appointments, discharge instructions, and after visit summary (AVS) provided to patient. Patient states understanding, discharged.

## 2022-02-09 NOTE — PROGRESS NOTES
Here for cycle 11. ANC 1.0 today. Parameter for paclitaxel was decreased from 1.5-1.2 with third dose of Taxol related to delay. We'll recheck her CBC on Friday and plan to treat if ANC is 1.2 or greater. Will decrease Taxol dose by 10%.

## 2022-02-09 NOTE — PATIENT INSTRUCTIONS
We will have your return Friday February 11, 2022 for Chemotherapy.  I have given you an updated schedule.    Your Chemotherapy has been dose reduced by 10%.       If you have any questions or concerns, we can be reached Monday through Friday 8am - 430pm at [phone] ([facility]). If you have concerns related to a potential reaction/side effect after hours/weekends/holiday's, please seek emergent medical care.

## 2022-02-10 ENCOUNTER — TELEPHONE (OUTPATIENT)
Dept: ONCOLOGY | Facility: OTHER | Age: 67
End: 2022-02-10
Payer: MEDICARE

## 2022-02-10 DIAGNOSIS — T45.1X5A CHEMOTHERAPY-INDUCED NEUTROPENIA (H): Primary | ICD-10-CM

## 2022-02-10 DIAGNOSIS — D70.1 CHEMOTHERAPY-INDUCED NEUTROPENIA (H): Primary | ICD-10-CM

## 2022-02-10 DIAGNOSIS — C50.511 MALIGNANT NEOPLASM OF LOWER-OUTER QUADRANT OF RIGHT BREAST OF FEMALE, ESTROGEN RECEPTOR POSITIVE (H): ICD-10-CM

## 2022-02-10 DIAGNOSIS — F90.0 ATTENTION DEFICIT HYPERACTIVITY DISORDER (ADHD), PREDOMINANTLY INATTENTIVE TYPE: ICD-10-CM

## 2022-02-10 DIAGNOSIS — Z17.0 MALIGNANT NEOPLASM OF LOWER-OUTER QUADRANT OF RIGHT BREAST OF FEMALE, ESTROGEN RECEPTOR POSITIVE (H): ICD-10-CM

## 2022-02-10 RX ORDER — ALBUTEROL SULFATE 90 UG/1
1-2 AEROSOL, METERED RESPIRATORY (INHALATION)
Status: CANCELLED
Start: 2022-02-11

## 2022-02-10 RX ORDER — EPINEPHRINE 1 MG/ML
0.3 INJECTION, SOLUTION, CONCENTRATE INTRAVENOUS EVERY 5 MIN PRN
Status: CANCELLED | OUTPATIENT
Start: 2022-02-11

## 2022-02-10 RX ORDER — MEPERIDINE HYDROCHLORIDE 25 MG/ML
25 INJECTION INTRAMUSCULAR; INTRAVENOUS; SUBCUTANEOUS EVERY 30 MIN PRN
Status: CANCELLED | OUTPATIENT
Start: 2022-02-11

## 2022-02-10 RX ORDER — NALOXONE HYDROCHLORIDE 0.4 MG/ML
0.2 INJECTION, SOLUTION INTRAMUSCULAR; INTRAVENOUS; SUBCUTANEOUS
Status: CANCELLED | OUTPATIENT
Start: 2022-02-11

## 2022-02-10 RX ORDER — ALBUTEROL SULFATE 0.83 MG/ML
2.5 SOLUTION RESPIRATORY (INHALATION)
Status: CANCELLED | OUTPATIENT
Start: 2022-02-11

## 2022-02-10 RX ORDER — METHYLPREDNISOLONE SODIUM SUCCINATE 125 MG/2ML
125 INJECTION, POWDER, LYOPHILIZED, FOR SOLUTION INTRAMUSCULAR; INTRAVENOUS
Status: CANCELLED
Start: 2022-02-11

## 2022-02-10 RX ORDER — LORAZEPAM 2 MG/ML
0.5 INJECTION INTRAMUSCULAR EVERY 4 HOURS PRN
Status: CANCELLED | OUTPATIENT
Start: 2022-02-11

## 2022-02-10 RX ORDER — DIPHENHYDRAMINE HCL 50 MG
50 CAPSULE ORAL
Status: CANCELLED
Start: 2022-02-11

## 2022-02-10 RX ORDER — HEPARIN SODIUM (PORCINE) LOCK FLUSH IV SOLN 100 UNIT/ML 100 UNIT/ML
5 SOLUTION INTRAVENOUS
Status: CANCELLED | OUTPATIENT
Start: 2022-02-11

## 2022-02-10 RX ORDER — HEPARIN SODIUM,PORCINE 10 UNIT/ML
5 VIAL (ML) INTRAVENOUS
Status: CANCELLED | OUTPATIENT
Start: 2022-02-11

## 2022-02-10 RX ORDER — DIPHENHYDRAMINE HYDROCHLORIDE 50 MG/ML
50 INJECTION INTRAMUSCULAR; INTRAVENOUS
Status: CANCELLED
Start: 2022-02-11

## 2022-02-10 NOTE — TELEPHONE ENCOUNTER
Patient's cycle 11 day 1 Taxol was deferred to tomorrow from earlier this week due to neutropenia. Her plan still has Hep panel labs, as well as CBC. Do we need to repeat the Hep panel again tomorrow or just CBC?

## 2022-02-11 ENCOUNTER — INFUSION THERAPY VISIT (OUTPATIENT)
Dept: INFUSION THERAPY | Facility: OTHER | Age: 67
End: 2022-02-11
Attending: INTERNAL MEDICINE
Payer: MEDICARE

## 2022-02-11 VITALS
OXYGEN SATURATION: 97 % | BODY MASS INDEX: 21.87 KG/M2 | TEMPERATURE: 97.1 F | DIASTOLIC BLOOD PRESSURE: 89 MMHG | SYSTOLIC BLOOD PRESSURE: 135 MMHG | RESPIRATION RATE: 17 BRPM | WEIGHT: 123.46 LBS | HEART RATE: 87 BPM

## 2022-02-11 DIAGNOSIS — D70.1 CHEMOTHERAPY-INDUCED NEUTROPENIA (H): Primary | ICD-10-CM

## 2022-02-11 DIAGNOSIS — T45.1X5A CHEMOTHERAPY-INDUCED NEUTROPENIA (H): Primary | ICD-10-CM

## 2022-02-11 DIAGNOSIS — Z17.0 MALIGNANT NEOPLASM OF LOWER-OUTER QUADRANT OF RIGHT BREAST OF FEMALE, ESTROGEN RECEPTOR POSITIVE (H): ICD-10-CM

## 2022-02-11 DIAGNOSIS — C50.511 MALIGNANT NEOPLASM OF LOWER-OUTER QUADRANT OF RIGHT BREAST OF FEMALE, ESTROGEN RECEPTOR POSITIVE (H): ICD-10-CM

## 2022-02-11 LAB
BASOPHILS # BLD AUTO: 0.1 10E3/UL (ref 0–0.2)
BASOPHILS NFR BLD AUTO: 2 %
EOSINOPHIL # BLD AUTO: 0.1 10E3/UL (ref 0–0.7)
EOSINOPHIL NFR BLD AUTO: 3 %
ERYTHROCYTE [DISTWIDTH] IN BLOOD BY AUTOMATED COUNT: 14 % (ref 10–15)
HCT VFR BLD AUTO: 34 % (ref 35–47)
HGB BLD-MCNC: 10.8 G/DL (ref 11.7–15.7)
IMM GRANULOCYTES # BLD: 0 10E3/UL
IMM GRANULOCYTES NFR BLD: 0 %
LYMPHOCYTES # BLD AUTO: 0.7 10E3/UL (ref 0.8–5.3)
LYMPHOCYTES NFR BLD AUTO: 27 %
MCH RBC QN AUTO: 32.2 PG (ref 26.5–33)
MCHC RBC AUTO-ENTMCNC: 31.8 G/DL (ref 31.5–36.5)
MCV RBC AUTO: 102 FL (ref 78–100)
MONOCYTES # BLD AUTO: 0.4 10E3/UL (ref 0–1.3)
MONOCYTES NFR BLD AUTO: 16 %
NEUTROPHILS # BLD AUTO: 1.4 10E3/UL (ref 1.6–8.3)
NEUTROPHILS NFR BLD AUTO: 52 %
NRBC # BLD AUTO: 0 10E3/UL
NRBC BLD AUTO-RTO: 0 /100
PLATELET # BLD AUTO: 250 10E3/UL (ref 150–450)
RBC # BLD AUTO: 3.35 10E6/UL (ref 3.8–5.2)
WBC # BLD AUTO: 2.6 10E3/UL (ref 4–11)

## 2022-02-11 PROCEDURE — 258N000003 HC RX IP 258 OP 636: Performed by: NURSE PRACTITIONER

## 2022-02-11 PROCEDURE — 85004 AUTOMATED DIFF WBC COUNT: CPT | Mod: ZL | Performed by: NURSE PRACTITIONER

## 2022-02-11 PROCEDURE — 96413 CHEMO IV INFUSION 1 HR: CPT

## 2022-02-11 PROCEDURE — 250N000011 HC RX IP 250 OP 636: Performed by: NURSE PRACTITIONER

## 2022-02-11 RX ORDER — METHYLPHENIDATE HYDROCHLORIDE 10 MG/1
10 TABLET ORAL 3 TIMES DAILY
Qty: 90 TABLET | Refills: 0 | Status: SHIPPED | OUTPATIENT
Start: 2022-02-11 | End: 2022-03-21

## 2022-02-11 RX ORDER — HEPARIN SODIUM (PORCINE) LOCK FLUSH IV SOLN 100 UNIT/ML 100 UNIT/ML
5 SOLUTION INTRAVENOUS
Status: DISCONTINUED | OUTPATIENT
Start: 2022-02-11 | End: 2022-02-11 | Stop reason: HOSPADM

## 2022-02-11 RX ADMIN — HEPARIN 5 ML: 100 SYRINGE at 11:59

## 2022-02-11 RX ADMIN — PACLITAXEL 111 MG: 6 INJECTION, SOLUTION INTRAVENOUS at 10:35

## 2022-02-11 RX ADMIN — SODIUM CHLORIDE 250 ML: 9 INJECTION, SOLUTION INTRAVENOUS at 10:42

## 2022-02-11 ASSESSMENT — PAIN SCALES - GENERAL: PAINLEVEL: NO PAIN (0)

## 2022-02-11 NOTE — PROGRESS NOTES
Patient returns today for chemotherapy treatment after being deferred to today from Wednesday.  Patient ANC return to treatment parameters.  Patient assessed Wednesday.  Patient states she has had no changes to her health since Wednesday.  Patient happy to be back on track with treatment.

## 2022-02-17 ENCOUNTER — INFUSION THERAPY VISIT (OUTPATIENT)
Dept: INFUSION THERAPY | Facility: OTHER | Age: 67
End: 2022-02-17
Attending: INTERNAL MEDICINE
Payer: MEDICARE

## 2022-02-17 VITALS
HEART RATE: 75 BPM | DIASTOLIC BLOOD PRESSURE: 75 MMHG | OXYGEN SATURATION: 100 % | WEIGHT: 122.8 LBS | RESPIRATION RATE: 18 BRPM | SYSTOLIC BLOOD PRESSURE: 129 MMHG | BODY MASS INDEX: 21.75 KG/M2

## 2022-02-17 DIAGNOSIS — Z17.0 MALIGNANT NEOPLASM OF LOWER-OUTER QUADRANT OF RIGHT BREAST OF FEMALE, ESTROGEN RECEPTOR POSITIVE (H): ICD-10-CM

## 2022-02-17 DIAGNOSIS — T45.1X5A CHEMOTHERAPY-INDUCED NEUTROPENIA (H): Primary | ICD-10-CM

## 2022-02-17 DIAGNOSIS — C50.511 MALIGNANT NEOPLASM OF LOWER-OUTER QUADRANT OF RIGHT BREAST OF FEMALE, ESTROGEN RECEPTOR POSITIVE (H): ICD-10-CM

## 2022-02-17 DIAGNOSIS — D70.1 CHEMOTHERAPY-INDUCED NEUTROPENIA (H): Primary | ICD-10-CM

## 2022-02-17 LAB
ALBUMIN SERPL-MCNC: 3.3 G/DL (ref 3.4–5)
ALP SERPL-CCNC: 65 U/L (ref 40–150)
ALT SERPL W P-5'-P-CCNC: 44 U/L (ref 0–50)
AST SERPL W P-5'-P-CCNC: 29 U/L (ref 0–45)
BASOPHILS # BLD AUTO: 0.1 10E3/UL (ref 0–0.2)
BASOPHILS NFR BLD AUTO: 2 %
BILIRUB DIRECT SERPL-MCNC: 0.1 MG/DL (ref 0–0.2)
BILIRUB SERPL-MCNC: 0.4 MG/DL (ref 0.2–1.3)
EOSINOPHIL # BLD AUTO: 0.1 10E3/UL (ref 0–0.7)
EOSINOPHIL NFR BLD AUTO: 2 %
ERYTHROCYTE [DISTWIDTH] IN BLOOD BY AUTOMATED COUNT: 13.4 % (ref 10–15)
HCT VFR BLD AUTO: 34.7 % (ref 35–47)
HGB BLD-MCNC: 11.2 G/DL (ref 11.7–15.7)
IMM GRANULOCYTES # BLD: 0 10E3/UL
IMM GRANULOCYTES NFR BLD: 0 %
LYMPHOCYTES # BLD AUTO: 0.7 10E3/UL (ref 0.8–5.3)
LYMPHOCYTES NFR BLD AUTO: 24 %
MCH RBC QN AUTO: 32.7 PG (ref 26.5–33)
MCHC RBC AUTO-ENTMCNC: 32.3 G/DL (ref 31.5–36.5)
MCV RBC AUTO: 101 FL (ref 78–100)
MONOCYTES # BLD AUTO: 0.4 10E3/UL (ref 0–1.3)
MONOCYTES NFR BLD AUTO: 14 %
NEUTROPHILS # BLD AUTO: 1.8 10E3/UL (ref 1.6–8.3)
NEUTROPHILS NFR BLD AUTO: 58 %
NRBC # BLD AUTO: 0 10E3/UL
NRBC BLD AUTO-RTO: 0 /100
PLATELET # BLD AUTO: 240 10E3/UL (ref 150–450)
PROT SERPL-MCNC: 6.5 G/DL (ref 6.8–8.8)
RBC # BLD AUTO: 3.43 10E6/UL (ref 3.8–5.2)
WBC # BLD AUTO: 3 10E3/UL (ref 4–11)

## 2022-02-17 PROCEDURE — 96413 CHEMO IV INFUSION 1 HR: CPT

## 2022-02-17 PROCEDURE — 258N000003 HC RX IP 258 OP 636: Performed by: INTERNAL MEDICINE

## 2022-02-17 PROCEDURE — 258N000003 HC RX IP 258 OP 636: Performed by: NURSE PRACTITIONER

## 2022-02-17 PROCEDURE — 96367 TX/PROPH/DG ADDL SEQ IV INF: CPT

## 2022-02-17 PROCEDURE — 82040 ASSAY OF SERUM ALBUMIN: CPT | Mod: ZL

## 2022-02-17 PROCEDURE — 85025 COMPLETE CBC W/AUTO DIFF WBC: CPT | Mod: ZL | Performed by: NURSE PRACTITIONER

## 2022-02-17 PROCEDURE — 36591 DRAW BLOOD OFF VENOUS DEVICE: CPT | Mod: ZL

## 2022-02-17 PROCEDURE — 250N000011 HC RX IP 250 OP 636: Performed by: NURSE PRACTITIONER

## 2022-02-17 PROCEDURE — 250N000011 HC RX IP 250 OP 636: Performed by: INTERNAL MEDICINE

## 2022-02-17 RX ORDER — HEPARIN SODIUM (PORCINE) LOCK FLUSH IV SOLN 100 UNIT/ML 100 UNIT/ML
5 SOLUTION INTRAVENOUS
Status: DISCONTINUED | OUTPATIENT
Start: 2022-02-17 | End: 2022-02-17 | Stop reason: HOSPADM

## 2022-02-17 RX ADMIN — SODIUM CHLORIDE 250 ML: 9 INJECTION, SOLUTION INTRAVENOUS at 10:16

## 2022-02-17 RX ADMIN — ONDANSETRON: 2 INJECTION INTRAMUSCULAR; INTRAVENOUS at 10:16

## 2022-02-17 RX ADMIN — Medication 5 ML: at 11:50

## 2022-02-17 RX ADMIN — PACLITAXEL 111 MG: 6 INJECTION, SOLUTION INTRAVENOUS at 10:46

## 2022-02-17 ASSESSMENT — PAIN SCALES - GENERAL: PAINLEVEL: NO PAIN (0)

## 2022-02-17 NOTE — PROGRESS NOTES
"Patient noting she wants her PRN Zofran this date. On review of treatment plan and therapy plans, there is no PRN Zofran ordered. Jeana Avila ordered as a one time order on 2-2-22 as 8mg IV. Per patient, it was supposed to be \"as needed\" and she could choose when she got it. Jeana's charting from that visit date does not support this. Spoke with RN Theresa, who had patient that date and she notes she did speak with Jeana who did in fact say it was to be added as a PRN at the dose/route ordered on 2-2-22. Added to plan this date. Reviewed with Dr Rolle, as Jeana is out, and will proceed.   "

## 2022-02-23 ENCOUNTER — INFUSION THERAPY VISIT (OUTPATIENT)
Dept: INFUSION THERAPY | Facility: OTHER | Age: 67
End: 2022-02-23
Attending: INTERNAL MEDICINE
Payer: MEDICARE

## 2022-02-23 ENCOUNTER — HOSPITAL ENCOUNTER (OUTPATIENT)
Dept: CARDIOLOGY | Facility: HOSPITAL | Age: 67
End: 2022-02-23
Attending: NURSE PRACTITIONER
Payer: MEDICARE

## 2022-02-23 ENCOUNTER — ONCOLOGY VISIT (OUTPATIENT)
Dept: ONCOLOGY | Facility: OTHER | Age: 67
End: 2022-02-23
Attending: NURSE PRACTITIONER
Payer: MEDICARE

## 2022-02-23 VITALS
HEIGHT: 63 IN | TEMPERATURE: 96.9 F | SYSTOLIC BLOOD PRESSURE: 122 MMHG | BODY MASS INDEX: 22.07 KG/M2 | DIASTOLIC BLOOD PRESSURE: 80 MMHG | WEIGHT: 124.56 LBS | OXYGEN SATURATION: 99 % | HEART RATE: 75 BPM | RESPIRATION RATE: 20 BRPM

## 2022-02-23 VITALS — SYSTOLIC BLOOD PRESSURE: 116 MMHG | DIASTOLIC BLOOD PRESSURE: 66 MMHG

## 2022-02-23 DIAGNOSIS — Z79.899 ENCOUNTER FOR MONITORING CARDIOTOXIC DRUG THERAPY: ICD-10-CM

## 2022-02-23 DIAGNOSIS — C50.511 MALIGNANT NEOPLASM OF LOWER-OUTER QUADRANT OF RIGHT BREAST OF FEMALE, ESTROGEN RECEPTOR POSITIVE (H): ICD-10-CM

## 2022-02-23 DIAGNOSIS — Z17.0 MALIGNANT NEOPLASM OF LOWER-OUTER QUADRANT OF RIGHT BREAST OF FEMALE, ESTROGEN RECEPTOR POSITIVE (H): ICD-10-CM

## 2022-02-23 DIAGNOSIS — D70.1 CHEMOTHERAPY-INDUCED NEUTROPENIA (H): Primary | ICD-10-CM

## 2022-02-23 DIAGNOSIS — Z17.0 MALIGNANT NEOPLASM OF LOWER-OUTER QUADRANT OF RIGHT BREAST OF FEMALE, ESTROGEN RECEPTOR POSITIVE (H): Primary | ICD-10-CM

## 2022-02-23 DIAGNOSIS — Z51.81 ENCOUNTER FOR MONITORING CARDIOTOXIC DRUG THERAPY: ICD-10-CM

## 2022-02-23 DIAGNOSIS — T45.1X5A CHEMOTHERAPY-INDUCED NEUTROPENIA (H): Primary | ICD-10-CM

## 2022-02-23 DIAGNOSIS — T45.1X5A CHEMOTHERAPY-INDUCED NEUTROPENIA (H): ICD-10-CM

## 2022-02-23 DIAGNOSIS — D70.1 CHEMOTHERAPY-INDUCED NEUTROPENIA (H): ICD-10-CM

## 2022-02-23 DIAGNOSIS — C50.511 MALIGNANT NEOPLASM OF LOWER-OUTER QUADRANT OF RIGHT BREAST OF FEMALE, ESTROGEN RECEPTOR POSITIVE (H): Primary | ICD-10-CM

## 2022-02-23 LAB
ALBUMIN SERPL-MCNC: 3.3 G/DL (ref 3.4–5)
ALP SERPL-CCNC: 61 U/L (ref 40–150)
ALT SERPL W P-5'-P-CCNC: 44 U/L (ref 0–50)
ANION GAP SERPL CALCULATED.3IONS-SCNC: 3 MMOL/L (ref 3–14)
AST SERPL W P-5'-P-CCNC: 30 U/L (ref 0–45)
BASOPHILS # BLD AUTO: 0.1 10E3/UL (ref 0–0.2)
BASOPHILS NFR BLD AUTO: 2 %
BILIRUB SERPL-MCNC: 0.2 MG/DL (ref 0.2–1.3)
BUN SERPL-MCNC: 24 MG/DL (ref 7–30)
CALCIUM SERPL-MCNC: 8.9 MG/DL (ref 8.5–10.1)
CHLORIDE BLD-SCNC: 109 MMOL/L (ref 94–109)
CO2 SERPL-SCNC: 26 MMOL/L (ref 20–32)
CREAT SERPL-MCNC: 0.63 MG/DL (ref 0.52–1.04)
EOSINOPHIL # BLD AUTO: 0.1 10E3/UL (ref 0–0.7)
EOSINOPHIL NFR BLD AUTO: 4 %
ERYTHROCYTE [DISTWIDTH] IN BLOOD BY AUTOMATED COUNT: 13.3 % (ref 10–15)
GFR SERPL CREATININE-BSD FRML MDRD: >90 ML/MIN/1.73M2
GLUCOSE BLD-MCNC: 90 MG/DL (ref 70–99)
HCT VFR BLD AUTO: 34.2 % (ref 35–47)
HGB BLD-MCNC: 11.3 G/DL (ref 11.7–15.7)
IMM GRANULOCYTES # BLD: 0 10E3/UL
IMM GRANULOCYTES NFR BLD: 0 %
LVEF ECHO: NORMAL
LYMPHOCYTES # BLD AUTO: 0.8 10E3/UL (ref 0.8–5.3)
LYMPHOCYTES NFR BLD AUTO: 31 %
MCH RBC QN AUTO: 33 PG (ref 26.5–33)
MCHC RBC AUTO-ENTMCNC: 33 G/DL (ref 31.5–36.5)
MCV RBC AUTO: 100 FL (ref 78–100)
MONOCYTES # BLD AUTO: 0.4 10E3/UL (ref 0–1.3)
MONOCYTES NFR BLD AUTO: 15 %
NEUTROPHILS # BLD AUTO: 1.2 10E3/UL (ref 1.6–8.3)
NEUTROPHILS NFR BLD AUTO: 48 %
NRBC # BLD AUTO: 0 10E3/UL
NRBC BLD AUTO-RTO: 0 /100
PLATELET # BLD AUTO: 234 10E3/UL (ref 150–450)
POTASSIUM BLD-SCNC: 4.3 MMOL/L (ref 3.4–5.3)
PROT SERPL-MCNC: 6.5 G/DL (ref 6.8–8.8)
RBC # BLD AUTO: 3.42 10E6/UL (ref 3.8–5.2)
SODIUM SERPL-SCNC: 138 MMOL/L (ref 133–144)
WBC # BLD AUTO: 2.6 10E3/UL (ref 4–11)

## 2022-02-23 PROCEDURE — 96375 TX/PRO/DX INJ NEW DRUG ADDON: CPT

## 2022-02-23 PROCEDURE — 93308 TTE F-UP OR LMTD: CPT | Mod: 26 | Performed by: INTERNAL MEDICINE

## 2022-02-23 PROCEDURE — 36591 DRAW BLOOD OFF VENOUS DEVICE: CPT | Mod: ZL

## 2022-02-23 PROCEDURE — 96413 CHEMO IV INFUSION 1 HR: CPT

## 2022-02-23 PROCEDURE — 85025 COMPLETE CBC W/AUTO DIFF WBC: CPT | Mod: ZL | Performed by: NURSE PRACTITIONER

## 2022-02-23 PROCEDURE — 93308 TTE F-UP OR LMTD: CPT

## 2022-02-23 PROCEDURE — 96523 IRRIG DRUG DELIVERY DEVICE: CPT

## 2022-02-23 PROCEDURE — 258N000003 HC RX IP 258 OP 636: Performed by: NURSE PRACTITIONER

## 2022-02-23 PROCEDURE — 82040 ASSAY OF SERUM ALBUMIN: CPT | Mod: ZL

## 2022-02-23 PROCEDURE — G0463 HOSPITAL OUTPT CLINIC VISIT: HCPCS

## 2022-02-23 PROCEDURE — 250N000011 HC RX IP 250 OP 636: Performed by: NURSE PRACTITIONER

## 2022-02-23 PROCEDURE — G0463 HOSPITAL OUTPT CLINIC VISIT: HCPCS | Mod: 25

## 2022-02-23 PROCEDURE — 80053 COMPREHEN METABOLIC PANEL: CPT | Mod: ZL

## 2022-02-23 PROCEDURE — 93325 DOPPLER ECHO COLOR FLOW MAPG: CPT | Mod: 26 | Performed by: INTERNAL MEDICINE

## 2022-02-23 PROCEDURE — 93325 DOPPLER ECHO COLOR FLOW MAPG: CPT

## 2022-02-23 PROCEDURE — 99214 OFFICE O/P EST MOD 30 MIN: CPT | Performed by: NURSE PRACTITIONER

## 2022-02-23 PROCEDURE — 96374 THER/PROPH/DIAG INJ IV PUSH: CPT

## 2022-02-23 PROCEDURE — 93321 DOPPLER ECHO F-UP/LMTD STD: CPT | Mod: 26 | Performed by: INTERNAL MEDICINE

## 2022-02-23 RX ORDER — METHYLPREDNISOLONE SODIUM SUCCINATE 125 MG/2ML
125 INJECTION, POWDER, LYOPHILIZED, FOR SOLUTION INTRAMUSCULAR; INTRAVENOUS
Status: CANCELLED
Start: 2022-03-11

## 2022-02-23 RX ORDER — LORAZEPAM 2 MG/ML
0.5 INJECTION INTRAMUSCULAR EVERY 4 HOURS PRN
Status: CANCELLED | OUTPATIENT
Start: 2022-03-04

## 2022-02-23 RX ORDER — ALBUTEROL SULFATE 90 UG/1
1-2 AEROSOL, METERED RESPIRATORY (INHALATION)
Status: CANCELLED
Start: 2022-03-18

## 2022-02-23 RX ORDER — ALBUTEROL SULFATE 0.83 MG/ML
2.5 SOLUTION RESPIRATORY (INHALATION)
Status: CANCELLED | OUTPATIENT
Start: 2022-03-04

## 2022-02-23 RX ORDER — METHYLPREDNISOLONE SODIUM SUCCINATE 125 MG/2ML
125 INJECTION, POWDER, LYOPHILIZED, FOR SOLUTION INTRAMUSCULAR; INTRAVENOUS
Status: CANCELLED
Start: 2022-03-18

## 2022-02-23 RX ORDER — NALOXONE HYDROCHLORIDE 0.4 MG/ML
0.2 INJECTION, SOLUTION INTRAMUSCULAR; INTRAVENOUS; SUBCUTANEOUS
Status: CANCELLED | OUTPATIENT
Start: 2022-03-18

## 2022-02-23 RX ORDER — EPINEPHRINE 1 MG/ML
0.3 INJECTION, SOLUTION, CONCENTRATE INTRAVENOUS EVERY 5 MIN PRN
Status: CANCELLED | OUTPATIENT
Start: 2022-03-04

## 2022-02-23 RX ORDER — HEPARIN SODIUM,PORCINE 10 UNIT/ML
5 VIAL (ML) INTRAVENOUS
Status: CANCELLED | OUTPATIENT
Start: 2022-03-04

## 2022-02-23 RX ORDER — DIPHENHYDRAMINE HYDROCHLORIDE 50 MG/ML
50 INJECTION INTRAMUSCULAR; INTRAVENOUS
Status: CANCELLED
Start: 2022-03-04

## 2022-02-23 RX ORDER — EPINEPHRINE 1 MG/ML
0.3 INJECTION, SOLUTION, CONCENTRATE INTRAVENOUS EVERY 5 MIN PRN
Status: CANCELLED | OUTPATIENT
Start: 2022-03-11

## 2022-02-23 RX ORDER — ALBUTEROL SULFATE 90 UG/1
1-2 AEROSOL, METERED RESPIRATORY (INHALATION)
Status: CANCELLED
Start: 2022-03-04

## 2022-02-23 RX ORDER — DIPHENHYDRAMINE HCL 50 MG
50 CAPSULE ORAL
Status: CANCELLED
Start: 2022-03-18

## 2022-02-23 RX ORDER — ALBUTEROL SULFATE 0.83 MG/ML
2.5 SOLUTION RESPIRATORY (INHALATION)
Status: CANCELLED | OUTPATIENT
Start: 2022-03-18

## 2022-02-23 RX ORDER — HEPARIN SODIUM (PORCINE) LOCK FLUSH IV SOLN 100 UNIT/ML 100 UNIT/ML
5 SOLUTION INTRAVENOUS
Status: DISCONTINUED | OUTPATIENT
Start: 2022-02-23 | End: 2022-02-23 | Stop reason: HOSPADM

## 2022-02-23 RX ORDER — HEPARIN SODIUM,PORCINE 10 UNIT/ML
5 VIAL (ML) INTRAVENOUS
Status: CANCELLED | OUTPATIENT
Start: 2022-03-18

## 2022-02-23 RX ORDER — EPINEPHRINE 1 MG/ML
0.3 INJECTION, SOLUTION, CONCENTRATE INTRAVENOUS EVERY 5 MIN PRN
Status: CANCELLED | OUTPATIENT
Start: 2022-03-18

## 2022-02-23 RX ORDER — DIPHENHYDRAMINE HCL 50 MG
50 CAPSULE ORAL
Status: CANCELLED
Start: 2022-03-04

## 2022-02-23 RX ORDER — HEPARIN SODIUM (PORCINE) LOCK FLUSH IV SOLN 100 UNIT/ML 100 UNIT/ML
5 SOLUTION INTRAVENOUS
Status: CANCELLED | OUTPATIENT
Start: 2022-03-04

## 2022-02-23 RX ORDER — NALOXONE HYDROCHLORIDE 0.4 MG/ML
0.2 INJECTION, SOLUTION INTRAMUSCULAR; INTRAVENOUS; SUBCUTANEOUS
Status: CANCELLED | OUTPATIENT
Start: 2022-03-11

## 2022-02-23 RX ORDER — DIPHENHYDRAMINE HCL 50 MG
50 CAPSULE ORAL
Status: CANCELLED
Start: 2022-03-11

## 2022-02-23 RX ORDER — NALOXONE HYDROCHLORIDE 0.4 MG/ML
0.2 INJECTION, SOLUTION INTRAMUSCULAR; INTRAVENOUS; SUBCUTANEOUS
Status: CANCELLED | OUTPATIENT
Start: 2022-03-04

## 2022-02-23 RX ORDER — HEPARIN SODIUM (PORCINE) LOCK FLUSH IV SOLN 100 UNIT/ML 100 UNIT/ML
5 SOLUTION INTRAVENOUS
Status: CANCELLED | OUTPATIENT
Start: 2022-03-18

## 2022-02-23 RX ORDER — HEPARIN SODIUM (PORCINE) LOCK FLUSH IV SOLN 100 UNIT/ML 100 UNIT/ML
5 SOLUTION INTRAVENOUS
Status: CANCELLED | OUTPATIENT
Start: 2022-03-11

## 2022-02-23 RX ORDER — MEPERIDINE HYDROCHLORIDE 25 MG/ML
25 INJECTION INTRAMUSCULAR; INTRAVENOUS; SUBCUTANEOUS EVERY 30 MIN PRN
Status: CANCELLED | OUTPATIENT
Start: 2022-03-11

## 2022-02-23 RX ORDER — ALBUTEROL SULFATE 90 UG/1
1-2 AEROSOL, METERED RESPIRATORY (INHALATION)
Status: CANCELLED
Start: 2022-03-11

## 2022-02-23 RX ORDER — DIPHENHYDRAMINE HYDROCHLORIDE 50 MG/ML
50 INJECTION INTRAMUSCULAR; INTRAVENOUS
Status: CANCELLED
Start: 2022-03-11

## 2022-02-23 RX ORDER — MEPERIDINE HYDROCHLORIDE 25 MG/ML
25 INJECTION INTRAMUSCULAR; INTRAVENOUS; SUBCUTANEOUS EVERY 30 MIN PRN
Status: CANCELLED | OUTPATIENT
Start: 2022-03-04

## 2022-02-23 RX ORDER — MEPERIDINE HYDROCHLORIDE 25 MG/ML
25 INJECTION INTRAMUSCULAR; INTRAVENOUS; SUBCUTANEOUS EVERY 30 MIN PRN
Status: CANCELLED | OUTPATIENT
Start: 2022-03-18

## 2022-02-23 RX ORDER — HEPARIN SODIUM,PORCINE 10 UNIT/ML
5 VIAL (ML) INTRAVENOUS
Status: CANCELLED | OUTPATIENT
Start: 2022-03-11

## 2022-02-23 RX ORDER — LORAZEPAM 2 MG/ML
0.5 INJECTION INTRAMUSCULAR EVERY 4 HOURS PRN
Status: CANCELLED | OUTPATIENT
Start: 2022-03-11

## 2022-02-23 RX ORDER — ALBUTEROL SULFATE 0.83 MG/ML
2.5 SOLUTION RESPIRATORY (INHALATION)
Status: CANCELLED | OUTPATIENT
Start: 2022-03-11

## 2022-02-23 RX ORDER — DIPHENHYDRAMINE HYDROCHLORIDE 50 MG/ML
50 INJECTION INTRAMUSCULAR; INTRAVENOUS
Status: CANCELLED
Start: 2022-03-18

## 2022-02-23 RX ORDER — LORAZEPAM 2 MG/ML
0.5 INJECTION INTRAMUSCULAR EVERY 4 HOURS PRN
Status: CANCELLED | OUTPATIENT
Start: 2022-03-18

## 2022-02-23 RX ORDER — METHYLPREDNISOLONE SODIUM SUCCINATE 125 MG/2ML
125 INJECTION, POWDER, LYOPHILIZED, FOR SOLUTION INTRAMUSCULAR; INTRAVENOUS
Status: CANCELLED
Start: 2022-03-04

## 2022-02-23 RX ADMIN — Medication 5 ML: at 12:39

## 2022-02-23 RX ADMIN — PACLITAXEL 111 MG: 6 INJECTION, SOLUTION INTRAVENOUS at 11:34

## 2022-02-23 RX ADMIN — SODIUM CHLORIDE 250 ML: 9 INJECTION, SOLUTION INTRAVENOUS at 10:36

## 2022-02-23 ASSESSMENT — PAIN SCALES - GENERAL: PAINLEVEL: NO PAIN (0)

## 2022-02-23 NOTE — NURSING NOTE
"Oncology Rooming Note    February 23, 2022 9:29 AM   Anne Marie Nix is a 66 year old female who presents for:    Chief Complaint   Patient presents with     Oncology Clinic Visit     Follow up Malignant neoplasm of lower-outer quadrant of right breast of female, estrogen receptor positive      Initial Vitals: /80   Pulse 75   Temp 96.9  F (36.1  C) (Tympanic)   Resp 20   Ht 1.6 m (5' 3\")   Wt 56.5 kg (124 lb 9 oz)   SpO2 99%   BMI 22.06 kg/m   Estimated body mass index is 22.06 kg/m  as calculated from the following:    Height as of this encounter: 1.6 m (5' 3\").    Weight as of this encounter: 56.5 kg (124 lb 9 oz). Body surface area is 1.58 meters squared.  No Pain (0) Comment: Data Unavailable   No LMP recorded. Patient is postmenopausal.  Allergies reviewed: Yes  Medications reviewed: Yes    Medications: Medication refills not needed today.  Pharmacy name entered into [facility]:    [facility] #[id number] - [place], [place] - [address]  [facility][id number] - [place], [place] - [address] [facility] [address]  [facility] #[id number] - [place], [place] - [address] AT [facility] [address]  [facility] - [place], [place] - [address]          Gaby Drew LPN            "

## 2022-02-23 NOTE — PATIENT INSTRUCTIONS
We would like to see you back in 4 weeks with Dr. Rolle (prior to your last cycle).     When you are in need of a refill, please call your pharmacy and they will send us a request.     If you have any questions please call [phone]    Other instructions: I will send a referral to the [facility] surgery department, Dr. Sofy Cobb.  Their office should call you to set up an appointment to discuss surgical options.

## 2022-02-23 NOTE — PROGRESS NOTES
Patient is a 66 year old here today for infusion of paclitaxel under the orders of Dr. Rolle.    Component      Latest Ref Rng & Units 2/23/2022   WBC      4.0 - 11.0 10e3/uL 2.6 (L)   RBC Count      3.80 - 5.20 10e6/uL 3.42 (L)   Hemoglobin      11.7 - 15.7 g/dL 11.3 (L)   Hematocrit      35.0 - 47.0 % 34.2 (L)   MCV      78 - 100 fL 100   MCH      26.5 - 33.0 pg 33.0   MCHC      31.5 - 36.5 g/dL 33.0   RDW      10.0 - 15.0 % 13.3   Platelet Count      150 - 450 10e3/uL 234   % Neutrophils      % 48   % Lymphocytes      % 31   % Monocytes      % 15   % Eosinophils      % 4   % Basophils      % 2   % Immature Granulocytes      % 0   NRBCs per 100 WBC      <1 /100 0   Absolute Neutrophils      1.6 - 8.3 10e3/uL 1.2 (L)   Absolute Lymphocytes      0.8 - 5.3 10e3/uL 0.8   Absolute Monocytes      0.0 - 1.3 10e3/uL 0.4   Absolute Eosinophils      0.0 - 0.7 10e3/uL 0.1   Absolute Basophils      0.0 - 0.2 10e3/uL 0.1   Absolute Immature Granulocytes      <=0.4 10e3/uL 0.0   Absolute NRBCs      10e3/uL 0.0   Sodium      133 - 144 mmol/L 138   Potassium      3.4 - 5.3 mmol/L 4.3   Chloride      94 - 109 mmol/L 109   Carbon Dioxide      20 - 32 mmol/L 26   Anion Gap      3 - 14 mmol/L 3   Urea Nitrogen      7 - 30 mg/dL 24   Creatinine      0.52 - 1.04 mg/dL 0.63   Calcium      8.5 - 10.1 mg/dL 8.9   Glucose      70 - 99 mg/dL 90   Alkaline Phosphatase      40 - 150 U/L 61   AST      0 - 45 U/L 30   ALT      0 - 50 U/L 44   Protein Total      6.8 - 8.8 g/dL 6.5 (L)   Albumin      3.4 - 5.0 g/dL 3.3 (L)   Bilirubin Total      0.2 - 1.3 mg/dL 0.2   GFR Estimate      >60 mL/min/1.73m2 >90     OLEKSANDR Bolden to proceed with treatment today     Paclitaxel dose(s) verified with Catherine R, RN prior to release of drug.    Patient meets parameters for today's infusion.  Denies questions or concerns regarding today's infusion and/or medications being administered.      Patient identified with two identifiers, order verified, and  verbal consent for today's infusion obtained from patient.    1134 IV pump verified with paclitaxel dose, drug, and rate of administration. Infusion administered per protocol. Patient tolerated infusion well, no signs or symptoms of adverse reaction noted. Patient denies pain nor discomfort.     Needle removed, tip intact. Site clean, dry and intact. Covered with a sterile bandage, slight pressure applied for 30 seconds. Pt instructed to leave bandage intact for a minimum of one hour, and to call with questions or concerns. Copy of appointments, discharge instructions, and after visit summary (AVS) provided to patient. Patient states understanding, discharged ambulatory.

## 2022-02-23 NOTE — PROGRESS NOTES
Oncology Follow-up Visit:  February 23, 2022    Reason for Visit:  Patient presents with:  Oncology Clinic Visit: Follow up Malignant neoplasm of lower-outer quadrant of right breast of female, estrogen receptor positive      Nursing Note and documentation reviewed: yes    HPI:  This is a 66-year-old female patient who presents to the oncology clinic today for evaluation prior to receiving cycle 13 chemotherapy for stage IIB invasive lobular carcinoma of the right breast diagnosed 4/2021.  She completed the AC portion of her treatment on 12/8/2021 and is now receiving Taxol.     She presents to the clinic today stating she is doing pretty well.  She does have some fatigue.  She is somewhat frustrated in that she feels as though she is unable to go anywhere as she does not want to get sick due to her low blood counts.  She denies any new pain, lumps or skin changes to the breast tissue but does have some slight discomfort at the previous lumpectomy site when she wears a bra.  She continues with some mild nausea after chemo at times.  She is not taking anything orally for this.  She has now resumed having the Zofran IV prior to therapy.  She does question follow-up plan after she completes treatment.  She is going to be going for an echocardiogram today after treatment.    Genetic counseling visit is scheduled for April.    Oncologic History:     3/30/2021 patient palpated a mass in the right breast  4/9/2021 she underwent mammogram showing an abnormality in the lower outer aspect of the right breast  4/13/2021  She underwent ultrasound-guided biopsy with pathology showing invasive lobular carcinoma, grade 2, ER positive, DE negative, HER-2/kevin negative  7/9/2021  She underwent bilateral breast MRI showing Corresponding to the ultrasound finding in the right lower breast at about the 7 o'clock position an area of significant increased enhancement and mass extends for about 2.4 x 1.0 x 1.9 cm. This is larger than the  ultrasound measurements.   8/23/2021 she underwent lumpectomy with sentinel nodes by Dr. Sofy Cobb with [facility] and pathology showed a 3 cm, grade 2 invasive lobular carcinoma with inferior lateral margin positive for invasive carcinoma; 3 sentinel nodes obtained, 1 with micrometastasis measuring 2 mm and 1 with macrometastasis and she was staged [id number]  9/15/2021 she was seen by Dr. Nunez Friday with Medical Oncology and felt the patient had an early stage breast cancer, stage II with positive margins and he recommended completion mastectomy and felt she would not likely benefit from additional axillary lymph node surgery; Oncotype DX resulted at 53 and he recommended 4 cycles of dose dense AC followed by 12 weeks of Taxol  9/27/2021  Echocardiogram showed an EF of 65%  10/11/2021  Patient was seen by Dr. Rolle with Medical Oncology at [facility] for a second opinion and he recommended a PET scan and MRI of the brain  10/11/2021  MRI of the brain was negative   10/13/2021  PET scan showed postop changes consistent with history of right breast lumpectomy and axillary node dissection and no evidence of metastatic disease  10/18/2021  She was seen again by Dr. Rolle and Staged IIB with recommendation was for AC x4 cycles followed by weekly Taxol x12  10/19/2021  Port-A-Cath placement by Dr. Chen  Echocardiogram  10/27/2021 cycle 1 Adriamycin/Cyclophosphamide  12/22/2021 initiation of Taxol      Genetics: Planned April 2022     Current Chemo Regime/TX: Paclitaxel 80 mg per metered squared weekly  Current Cycle:  13   # of completed cycles: 12    **Cycle 3- Taxol delayed related to ANC of 0.9/treatment parameter for ANC changed to 1.2 on 1/12/2022    **Cycle 11-taxol dose decreased by 10% due to neutropenia     Previous treatment:  Doxorubicin 60 mg per metered squared and cyclophosphamide 600 mg per metered squared given every 14 days with pegfilgrastim 6 mg subcutaneous day 2    Past Medical  History:   Diagnosis Date     Acne vulgaris      Cancer (H)      Chemotherapy-induced neutropenia ([facility]) 10/25/2021     Complication of anesthesia     nauseated     Premenstrual tension syndromes 10/14/2004       Past Surgical History:   Procedure Laterality Date     INSERT PORT VASCULAR ACCESS N/A 10/19/2021    Procedure: port-a-cath placement;  Surgeon: Reginaldo Chen MD;  Location: [facility]     LUMPECTOMY BREAST Right 08/23/2021     ZZC OBSTETRICAL D&C         Family History   Problem Relation Age of Onset     Allergies Mother      Breast Cancer Mother      Diabetes Mother      Cardiovascular Paternal Grandfather      Allergies Paternal Grandfather      Angina Paternal Grandfather      Coronary Artery Disease Paternal Grandfather      Other - See Comments Father      Depression Maternal Grandmother      Cerebrovascular Disease Maternal Grandmother      Thyroid Disease Maternal Grandmother      Colon Cancer Maternal Grandfather      Diabetes Maternal Grandfather      Rheumatoid Arthritis Paternal Grandmother      Breast Cancer Cousin      Breast Cancer Maternal Aunt      Cancer Maternal Aunt         esphogeal, lung     Hypertension No family hx of      Hyperlipidemia No family hx of      Prostate Cancer No family hx of      Anesthesia Reaction No family hx of      Asthma No family hx of      Genetic Disorder No family hx of        Social History     Socioeconomic History     Marital status:      Spouse name: Not on file     Number of children: Not on file     Years of education: Not on file     Highest education level: Not on file   Occupational History     Not on file   Tobacco Use     Smoking status: Never Smoker     Smokeless tobacco: Never Used   Substance and Sexual Activity     Alcohol use: No     Drug use: No     Sexual activity: Not on file   Other Topics Concern     Parent/sibling w/ CABG, MI or angioplasty before 65F 55M? Yes   Social History Narrative     Not on file     Social Determinants of  Health     Financial Resource Strain: Not on file   Food Insecurity: Not on file   Transportation Needs: Not on file   Physical Activity: Not on file   Stress: Not on file   Social Connections: Not on file   Intimate Partner Violence: Not on file   Housing Stability: Not on file       Current Outpatient Medications   Medication     melatonin 3 MG tablet     methylphenidate (RITALIN) 10 MG tablet     NONFORMULARY     NONFORMULARY     NONFORMULARY     NONFORMULARY     Ascorbic Acid (VITAMIN C) POWD     clindamycin-benzoyl peroxide (BENZACLIN) 1-5 % external gel     dapsone 5 % topical gel     Lysine 1000 MG TABS     mupirocin (BACTROBAN) 2 % external ointment     NONFORMULARY     NONFORMULARY     NONFORMULARY     OLANZapine (ZYPREXA) 10 MG tablet     ondansetron (ZOFRAN) 8 MG tablet     prochlorperazine (COMPAZINE) 10 MG tablet     pseudoePHEDrine (SUDAFED) 30 MG tablet     Turmeric Curcumin 500 MG CAPS     valACYclovir (VALTREX) 1000 mg tablet     vitamin B complex with vitamin C (VITAMIN  B COMPLEX) tablet     No current facility-administered medications for this visit.        Allergies   Allergen Reactions     Vancomycin Other (See Comments)     Legs went numb- side effect     Amoxicillin Unknown     Headache  Stomach aches  Augmentin     Amoxicillin Trihydrate Other (See Comments)     Headache  Stomach aches  Augmentin         Clavulanic Acid Potassium Other (See Comments)     Headaches  Stomach aches  Augmentin       Levaquin [Levofloxacin] Other (See Comments)     Numbness in leg       Review Of Systems:  Constitutional:    denies fever, weight changes, chills, and night sweats.  Eyes:    denies blurred or double vision  Ears/Nose/Throat:   denies ear pain, nose problems, difficulty swallowing  Respiratory:   denies shortness of breath, cough   Skin:   denies rash, lesions  Breast/Chest wall:   denies new pain, lumps   Cardiovascular:   denies chest pain, palpitations, mild edema in ankles and  "feet  Gastrointestinal:   denies abdominal pain, bloating, nausea, early satiety; no change in bowel habits or blood in stool  Genitourinary:   denies difficulty with urination, blood in urine  Musculoskeletal:    denies new muscle pain, bone pain  Neurologic:   denies lightheadedness, headaches, numbness or tingling  Psychiatric:   denies anxiety, depression  Hematologic/Lymphatic/Immunologic:   denies easy bruising, easy bleeding, lumps or bumps noted  Endocrine:   Denies increased thirst    ECOG Performance Status: 1    Physical Exam:  /80   Pulse 75   Temp 96.9  F (36.1  C) (Tympanic)   Resp 20   Ht 1.6 m (5' 3\")   Wt 56.5 kg (124 lb 9 oz)   SpO2 99%   BMI 22.06 kg/m      GENERAL APPEARANCE: Healthy, alert and in no acute distress.  HEENT: Normocephalic, Sclerae anicteric.   NECK:   No asymmetry or masses, no thyromegaly.  LYMPHATICS: No palpable cervical, supraclavicular nodes   RESP: Lungs clear to auscultation bilaterally, respirations regular and easy  CARDIOVASCULAR: Regular rate and rhythm. Normal S1, S2; no murmur, gallop, or rub.  NEURO: Alert and oriented x 3.  Gait steady.  PSYCHIATRIC: Mentation and affect appear normal.  Mood appropriate.    Laboratory:  Results for orders placed or performed in visit on 02/23/22   Comprehensive metabolic panel     Status: Abnormal   Result Value Ref Range    Sodium 138 133 - 144 mmol/L    Potassium 4.3 3.4 - 5.3 mmol/L    Chloride 109 94 - 109 mmol/L    Carbon Dioxide (CO2) 26 20 - 32 mmol/L    Anion Gap 3 3 - 14 mmol/L    Urea Nitrogen 24 7 - 30 mg/dL    Creatinine 0.63 0.52 - 1.04 mg/dL    Calcium 8.9 8.5 - 10.1 mg/dL    Glucose 90 70 - 99 mg/dL    Alkaline Phosphatase 61 40 - 150 U/L    AST 30 0 - 45 U/L    ALT 44 0 - 50 U/L    Protein Total 6.5 (L) 6.8 - 8.8 g/dL    Albumin 3.3 (L) 3.4 - 5.0 g/dL    Bilirubin Total 0.2 0.2 - 1.3 mg/dL    GFR Estimate >90 >60 mL/min/1.73m2   CBC with platelets and differential     Status: Abnormal   Result Value Ref " Range    WBC Count 2.6 (L) 4.0 - 11.0 10e3/uL    RBC Count 3.42 (L) 3.80 - 5.20 10e6/uL    Hemoglobin 11.3 (L) 11.7 - 15.7 g/dL    Hematocrit 34.2 (L) 35.0 - 47.0 %     78 - 100 fL    MCH 33.0 26.5 - 33.0 pg    MCHC 33.0 31.5 - 36.5 g/dL    RDW 13.3 10.0 - 15.0 %    Platelet Count 234 150 - 450 10e3/uL    % Neutrophils 48 %    % Lymphocytes 31 %    % Monocytes 15 %    % Eosinophils 4 %    % Basophils 2 %    % Immature Granulocytes 0 %    NRBCs per 100 WBC 0 <1 /100    Absolute Neutrophils 1.2 (L) 1.6 - 8.3 10e3/uL    Absolute Lymphocytes 0.8 0.8 - 5.3 10e3/uL    Absolute Monocytes 0.4 0.0 - 1.3 10e3/uL    Absolute Eosinophils 0.1 0.0 - 0.7 10e3/uL    Absolute Basophils 0.1 0.0 - 0.2 10e3/uL    Absolute Immature Granulocytes 0.0 <=0.4 10e3/uL    Absolute NRBCs 0.0 10e3/uL   CBC with platelets differential     Status: Abnormal    Narrative    The following orders were created for panel order CBC with platelets differential.  Procedure                               Abnormality         Status                     ---------                               -----------         ------                     CBC with platelets and d...[[id number]]  Abnormal            Final result                 Please view results for these tests on the individual orders.       Imaging Studies: None completed for today's visit      ASSESSMENT/PLAN:    #1 Breast cancer: Stage IIB invasive lobular carcinoma of the right breast diagnosed 4/2021 and currently undergoing therapy with Taxol. Taxol dose decreased by 10% with cycle 11 due to neutropenia.  She is tolerating fairly well.    She meets parameters for treatment today.  She will receive cycle 13 today and follow-up prior to cycle 16 with labs per treatment plan.  She is aware that I will send a referral to Dr. Cobb at [facility] for surgical plan.    Genetic counseling visit will be completed in April 2022.    #2 encounter for monitoring cardiotoxic drug therapy: She will be undergoing  an echocardiogram today.    I encouraged patient to call with any questions or concerns.    30 minutes spent in the patient's encounter today with time spent in review of the chart along with in chart preparation.  Time was also spent in review of treatment plan and signing her treatment plan.  Time was also spent obtaining a review of systems and performing a physical exam along with review of her lab work in detail.  Time was also spent in placing a referral to surgery and in chart documentation.    Valentina Avila, NP  APRN, FNP-BC, AOCNP

## 2022-02-23 NOTE — PATIENT INSTRUCTIONS

## 2022-02-23 NOTE — PROGRESS NOTES
Patients port accessed using non-coring, 19 gauge, 3/4 needle, per facility protocol.     Hand hygiene performed: yes   Mask donned by caregiver: yes  Site prepped with CHG: yes   Labs drawn: yes   Dressing applied using aseptic technique: yes     Port flushed easily, without resistance. Flushed with 10 cc's normal saline.   Immediate blood return noted. 10 cc blood discarded.  2 vials blood draw and sent to lab for results.  Port flushed with 20 cc 0.9% normal saline Patient tolerated port flush well, denies pain nor discomfort at this time. Patient discharged to care of Medical oncology.  Will return for treatment after provider visit.

## 2022-03-02 ENCOUNTER — INFUSION THERAPY VISIT (OUTPATIENT)
Dept: INFUSION THERAPY | Facility: OTHER | Age: 67
End: 2022-03-02
Attending: INTERNAL MEDICINE
Payer: MEDICARE

## 2022-03-02 VITALS
DIASTOLIC BLOOD PRESSURE: 78 MMHG | SYSTOLIC BLOOD PRESSURE: 119 MMHG | BODY MASS INDEX: 21.95 KG/M2 | WEIGHT: 123.9 LBS | HEART RATE: 71 BPM

## 2022-03-02 DIAGNOSIS — T45.1X5A CHEMOTHERAPY-INDUCED NEUTROPENIA (H): Primary | ICD-10-CM

## 2022-03-02 DIAGNOSIS — D70.1 CHEMOTHERAPY-INDUCED NEUTROPENIA (H): Primary | ICD-10-CM

## 2022-03-02 DIAGNOSIS — Z17.0 MALIGNANT NEOPLASM OF LOWER-OUTER QUADRANT OF RIGHT BREAST OF FEMALE, ESTROGEN RECEPTOR POSITIVE (H): ICD-10-CM

## 2022-03-02 DIAGNOSIS — C50.511 MALIGNANT NEOPLASM OF LOWER-OUTER QUADRANT OF RIGHT BREAST OF FEMALE, ESTROGEN RECEPTOR POSITIVE (H): ICD-10-CM

## 2022-03-02 LAB
ALBUMIN SERPL-MCNC: 3.2 G/DL (ref 3.4–5)
ALP SERPL-CCNC: 57 U/L (ref 40–150)
ALT SERPL W P-5'-P-CCNC: 45 U/L (ref 0–50)
AST SERPL W P-5'-P-CCNC: 33 U/L (ref 0–45)
BASOPHILS # BLD AUTO: 0.1 10E3/UL (ref 0–0.2)
BASOPHILS NFR BLD AUTO: 3 %
BILIRUB DIRECT SERPL-MCNC: <0.1 MG/DL (ref 0–0.2)
BILIRUB SERPL-MCNC: 0.3 MG/DL (ref 0.2–1.3)
EOSINOPHIL # BLD AUTO: 0.1 10E3/UL (ref 0–0.7)
EOSINOPHIL NFR BLD AUTO: 4 %
ERYTHROCYTE [DISTWIDTH] IN BLOOD BY AUTOMATED COUNT: 13.7 % (ref 10–15)
HCT VFR BLD AUTO: 34.9 % (ref 35–47)
HGB BLD-MCNC: 11.3 G/DL (ref 11.7–15.7)
IMM GRANULOCYTES # BLD: 0 10E3/UL
IMM GRANULOCYTES NFR BLD: 1 %
LYMPHOCYTES # BLD AUTO: 0.7 10E3/UL (ref 0.8–5.3)
LYMPHOCYTES NFR BLD AUTO: 24 %
MCH RBC QN AUTO: 32.3 PG (ref 26.5–33)
MCHC RBC AUTO-ENTMCNC: 32.4 G/DL (ref 31.5–36.5)
MCV RBC AUTO: 100 FL (ref 78–100)
MONOCYTES # BLD AUTO: 0.4 10E3/UL (ref 0–1.3)
MONOCYTES NFR BLD AUTO: 14 %
NEUTROPHILS # BLD AUTO: 1.6 10E3/UL (ref 1.6–8.3)
NEUTROPHILS NFR BLD AUTO: 54 %
NRBC # BLD AUTO: 0 10E3/UL
NRBC BLD AUTO-RTO: 0 /100
PLATELET # BLD AUTO: 257 10E3/UL (ref 150–450)
PROT SERPL-MCNC: 6.3 G/DL (ref 6.8–8.8)
RBC # BLD AUTO: 3.5 10E6/UL (ref 3.8–5.2)
WBC # BLD AUTO: 2.9 10E3/UL (ref 4–11)

## 2022-03-02 PROCEDURE — 258N000003 HC RX IP 258 OP 636: Performed by: NURSE PRACTITIONER

## 2022-03-02 PROCEDURE — 36591 DRAW BLOOD OFF VENOUS DEVICE: CPT | Mod: ZL

## 2022-03-02 PROCEDURE — 96375 TX/PRO/DX INJ NEW DRUG ADDON: CPT

## 2022-03-02 PROCEDURE — 96367 TX/PROPH/DG ADDL SEQ IV INF: CPT

## 2022-03-02 PROCEDURE — 85025 COMPLETE CBC W/AUTO DIFF WBC: CPT | Mod: ZL | Performed by: NURSE PRACTITIONER

## 2022-03-02 PROCEDURE — 80076 HEPATIC FUNCTION PANEL: CPT | Mod: ZL

## 2022-03-02 PROCEDURE — 250N000011 HC RX IP 250 OP 636: Performed by: NURSE PRACTITIONER

## 2022-03-02 PROCEDURE — 96413 CHEMO IV INFUSION 1 HR: CPT

## 2022-03-02 RX ORDER — HEPARIN SODIUM (PORCINE) LOCK FLUSH IV SOLN 100 UNIT/ML 100 UNIT/ML
5 SOLUTION INTRAVENOUS
Status: DISCONTINUED | OUTPATIENT
Start: 2022-03-02 | End: 2022-03-02 | Stop reason: HOSPADM

## 2022-03-02 RX ADMIN — PACLITAXEL 111 MG: 6 INJECTION, SOLUTION INTRAVENOUS at 10:35

## 2022-03-02 RX ADMIN — SODIUM CHLORIDE 250 ML: 9 INJECTION, SOLUTION INTRAVENOUS at 10:04

## 2022-03-02 RX ADMIN — Medication 5 ML: at 11:45

## 2022-03-02 RX ADMIN — ONDANSETRON 8 MG: 2 INJECTION INTRAMUSCULAR; INTRAVENOUS at 10:04

## 2022-03-02 NOTE — PROGRESS NOTES
Patient is 66  years old, here today for infusion of Paclitaxol under the orders of Dr Rolle.      Power port accessed with 19 gauge 3/4 inch non-coring needle.  Line flushed with 10 cc's normal saline.  Needle secured with sterile transparent dressing.  10 cc's blood discarded, and blood taken for ordered labs.  Patient tolerated well.  Denies pain and discomfort at this time.  Port flushes easily without resistance.    Hand hygiene performed: yes   Mask donned by caregiver: no Site prepped with CHG: yes Labs drawn: yes Dressing applied using aseptic technique: yes     Lab values: are with in normal limits.   WNL reviewed by this writer     Patient meets parameters for today's infusion.  Denies questions or concerns regarding today's infusion and/or medications being administered.      Independent dose check completed with Orlin HUANG RN.    Patient identified with two identifiers, order verified, and verbal consent for today's infusion obtained from patient.     1035  IV pump verified with Taxol dose, drug, and rate of administration. Infusion administered per protocol. Patient tolerated infusion well, no signs or symptoms of adverse reaction noted. Patient denies pain nor discomfort.     Needle removed, tip intact. Site clean, dry and intact. Covered with a sterile bandage, slight pressure applied for 30 seconds. Pt instructed to leave bandage intact for a minimum of one hour, and to call with questions or concerns. Patient states understanding, discharged.

## 2022-03-09 ENCOUNTER — INFUSION THERAPY VISIT (OUTPATIENT)
Dept: INFUSION THERAPY | Facility: OTHER | Age: 67
End: 2022-03-09
Attending: INTERNAL MEDICINE
Payer: MEDICARE

## 2022-03-09 VITALS
BODY MASS INDEX: 22.14 KG/M2 | TEMPERATURE: 97.4 F | RESPIRATION RATE: 17 BRPM | WEIGHT: 125 LBS | HEART RATE: 78 BPM | SYSTOLIC BLOOD PRESSURE: 109 MMHG | DIASTOLIC BLOOD PRESSURE: 66 MMHG | OXYGEN SATURATION: 99 %

## 2022-03-09 DIAGNOSIS — T45.1X5A CHEMOTHERAPY-INDUCED NEUTROPENIA (H): Primary | ICD-10-CM

## 2022-03-09 DIAGNOSIS — C50.511 MALIGNANT NEOPLASM OF LOWER-OUTER QUADRANT OF RIGHT BREAST OF FEMALE, ESTROGEN RECEPTOR POSITIVE (H): ICD-10-CM

## 2022-03-09 DIAGNOSIS — D70.1 CHEMOTHERAPY-INDUCED NEUTROPENIA (H): Primary | ICD-10-CM

## 2022-03-09 DIAGNOSIS — Z17.0 MALIGNANT NEOPLASM OF LOWER-OUTER QUADRANT OF RIGHT BREAST OF FEMALE, ESTROGEN RECEPTOR POSITIVE (H): ICD-10-CM

## 2022-03-09 LAB
ALBUMIN SERPL-MCNC: 3.2 G/DL (ref 3.4–5)
ALP SERPL-CCNC: 54 U/L (ref 40–150)
ALT SERPL W P-5'-P-CCNC: 36 U/L (ref 0–50)
AST SERPL W P-5'-P-CCNC: 22 U/L (ref 0–45)
BASOPHILS # BLD MANUAL: 0.2 10E3/UL (ref 0–0.2)
BASOPHILS NFR BLD MANUAL: 8 %
BILIRUB DIRECT SERPL-MCNC: <0.1 MG/DL (ref 0–0.2)
BILIRUB SERPL-MCNC: 0.3 MG/DL (ref 0.2–1.3)
EOSINOPHIL # BLD MANUAL: 0.2 10E3/UL (ref 0–0.7)
EOSINOPHIL NFR BLD MANUAL: 8 %
ERYTHROCYTE [DISTWIDTH] IN BLOOD BY AUTOMATED COUNT: 14.1 % (ref 10–15)
HCT VFR BLD AUTO: 34.4 % (ref 35–47)
HGB BLD-MCNC: 11.1 G/DL (ref 11.7–15.7)
LYMPHOCYTES # BLD MANUAL: 0.7 10E3/UL (ref 0.8–5.3)
LYMPHOCYTES NFR BLD MANUAL: 25 %
MCH RBC QN AUTO: 32.3 PG (ref 26.5–33)
MCHC RBC AUTO-ENTMCNC: 32.3 G/DL (ref 31.5–36.5)
MCV RBC AUTO: 100 FL (ref 78–100)
MONOCYTES # BLD MANUAL: 0.4 10E3/UL (ref 0–1.3)
MONOCYTES NFR BLD MANUAL: 15 %
NEUTROPHILS # BLD MANUAL: 1.2 10E3/UL (ref 1.6–8.3)
NEUTROPHILS NFR BLD MANUAL: 44 %
PLAT MORPH BLD: ABNORMAL
PLATELET # BLD AUTO: 244 10E3/UL (ref 150–450)
PROT SERPL-MCNC: 6.2 G/DL (ref 6.8–8.8)
RBC # BLD AUTO: 3.44 10E6/UL (ref 3.8–5.2)
RBC MORPH BLD: ABNORMAL
WBC # BLD AUTO: 2.7 10E3/UL (ref 4–11)

## 2022-03-09 PROCEDURE — 96375 TX/PRO/DX INJ NEW DRUG ADDON: CPT

## 2022-03-09 PROCEDURE — 82040 ASSAY OF SERUM ALBUMIN: CPT | Mod: ZL

## 2022-03-09 PROCEDURE — 96413 CHEMO IV INFUSION 1 HR: CPT

## 2022-03-09 PROCEDURE — 36591 DRAW BLOOD OFF VENOUS DEVICE: CPT | Mod: ZL

## 2022-03-09 PROCEDURE — 250N000011 HC RX IP 250 OP 636: Performed by: NURSE PRACTITIONER

## 2022-03-09 PROCEDURE — 258N000003 HC RX IP 258 OP 636: Performed by: NURSE PRACTITIONER

## 2022-03-09 PROCEDURE — 250N000011 HC RX IP 250 OP 636: Performed by: INTERNAL MEDICINE

## 2022-03-09 PROCEDURE — 85027 COMPLETE CBC AUTOMATED: CPT | Mod: ZL | Performed by: NURSE PRACTITIONER

## 2022-03-09 PROCEDURE — 96365 THER/PROPH/DIAG IV INF INIT: CPT

## 2022-03-09 RX ORDER — ONDANSETRON 2 MG/ML
8 INJECTION INTRAMUSCULAR; INTRAVENOUS ONCE
Status: COMPLETED | OUTPATIENT
Start: 2022-03-09 | End: 2022-03-09

## 2022-03-09 RX ORDER — HEPARIN SODIUM (PORCINE) LOCK FLUSH IV SOLN 100 UNIT/ML 100 UNIT/ML
5 SOLUTION INTRAVENOUS
Status: DISCONTINUED | OUTPATIENT
Start: 2022-03-09 | End: 2022-03-09 | Stop reason: HOSPADM

## 2022-03-09 RX ADMIN — SODIUM CHLORIDE 250 ML: 9 INJECTION, SOLUTION INTRAVENOUS at 10:12

## 2022-03-09 RX ADMIN — ONDANSETRON 8 MG: 2 INJECTION INTRAMUSCULAR; INTRAVENOUS at 10:15

## 2022-03-09 RX ADMIN — PACLITAXEL 111 MG: 6 INJECTION, SOLUTION INTRAVENOUS at 10:55

## 2022-03-09 RX ADMIN — Medication 5 ML: at 12:23

## 2022-03-09 NOTE — PROGRESS NOTES
Patient is 66  years old, here today for infusion of Taxol under the orders of Dr Rolle.      Power port accessed with 19 gauge 3/4 inch non-coring needle.  Line flushed with 10 cc's normal saline.  Needle secured with sterile transparent dressing.  10 cc's blood discarded, and blood taken for ordered labs.  Patient tolerated well.  Denies pain and discomfort at this time.  Port flushes easily without resistance.    Hand hygiene performed: yes   Mask donned by caregiver: yes Site prepped with CHG: yes Labs drawn: yes Dressing applied using aseptic technique: yes     Lab values: Reviewed and patient    meets parameters for today's infusion.  Denies questions or concerns regarding today's infusion and/or medications being administered.      Independent dose check completed with SHARRI Ordaz.    Patient identified with two identifiers, order verified, and verbal consent for today's infusion obtained from patient.      IV pump verified with taxol dose, drug, and rate of administration. Infusion administered per protocol. Patient tolerated infusion well, no signs or symptoms of adverse reaction noted. Patient denies pain nor discomfort.     Needle removed, tip intact. Site clean, dry and intact. Covered with a sterile bandage, slight pressure applied for 30 seconds. Pt instructed to leave bandage intact for a minimum of one hour, and to call with questions or concerns. Patient states understanding, discharged.

## 2022-03-15 ENCOUNTER — ONCOLOGY VISIT (OUTPATIENT)
Dept: ONCOLOGY | Facility: OTHER | Age: 67
End: 2022-03-15
Attending: INTERNAL MEDICINE
Payer: MEDICARE

## 2022-03-15 ENCOUNTER — LAB (OUTPATIENT)
Dept: INFUSION THERAPY | Facility: OTHER | Age: 67
End: 2022-03-15
Attending: INTERNAL MEDICINE
Payer: MEDICARE

## 2022-03-15 VITALS
RESPIRATION RATE: 18 BRPM | WEIGHT: 126.76 LBS | TEMPERATURE: 97.9 F | SYSTOLIC BLOOD PRESSURE: 152 MMHG | HEIGHT: 63 IN | HEART RATE: 99 BPM | OXYGEN SATURATION: 99 % | BODY MASS INDEX: 22.46 KG/M2 | DIASTOLIC BLOOD PRESSURE: 83 MMHG

## 2022-03-15 DIAGNOSIS — C50.511 MALIGNANT NEOPLASM OF LOWER-OUTER QUADRANT OF RIGHT BREAST OF FEMALE, ESTROGEN RECEPTOR POSITIVE (H): Primary | ICD-10-CM

## 2022-03-15 DIAGNOSIS — C50.511 MALIGNANT NEOPLASM OF LOWER-OUTER QUADRANT OF RIGHT BREAST OF FEMALE, ESTROGEN RECEPTOR POSITIVE (H): ICD-10-CM

## 2022-03-15 DIAGNOSIS — T45.1X5A CHEMOTHERAPY-INDUCED NEUTROPENIA (H): Primary | ICD-10-CM

## 2022-03-15 DIAGNOSIS — Z17.0 MALIGNANT NEOPLASM OF LOWER-OUTER QUADRANT OF RIGHT BREAST OF FEMALE, ESTROGEN RECEPTOR POSITIVE (H): Primary | ICD-10-CM

## 2022-03-15 DIAGNOSIS — D70.1 CHEMOTHERAPY-INDUCED NEUTROPENIA (H): Primary | ICD-10-CM

## 2022-03-15 DIAGNOSIS — Z17.0 MALIGNANT NEOPLASM OF LOWER-OUTER QUADRANT OF RIGHT BREAST OF FEMALE, ESTROGEN RECEPTOR POSITIVE (H): ICD-10-CM

## 2022-03-15 LAB
ALBUMIN SERPL-MCNC: 3.4 G/DL (ref 3.4–5)
ALP SERPL-CCNC: 58 U/L (ref 40–150)
ALT SERPL W P-5'-P-CCNC: 39 U/L (ref 0–50)
ANION GAP SERPL CALCULATED.3IONS-SCNC: 6 MMOL/L (ref 3–14)
AST SERPL W P-5'-P-CCNC: 25 U/L (ref 0–45)
BASOPHILS # BLD MANUAL: 0.1 10E3/UL (ref 0–0.2)
BASOPHILS NFR BLD MANUAL: 4 %
BILIRUB SERPL-MCNC: 0.4 MG/DL (ref 0.2–1.3)
BUN SERPL-MCNC: 22 MG/DL (ref 7–30)
CALCIUM SERPL-MCNC: 9 MG/DL (ref 8.5–10.1)
CHLORIDE BLD-SCNC: 107 MMOL/L (ref 94–109)
CO2 SERPL-SCNC: 24 MMOL/L (ref 20–32)
CREAT SERPL-MCNC: 0.56 MG/DL (ref 0.52–1.04)
EOSINOPHIL # BLD MANUAL: 0 10E3/UL (ref 0–0.7)
EOSINOPHIL NFR BLD MANUAL: 0 %
ERYTHROCYTE [DISTWIDTH] IN BLOOD BY AUTOMATED COUNT: 14.2 % (ref 10–15)
GFR SERPL CREATININE-BSD FRML MDRD: >90 ML/MIN/1.73M2
GLUCOSE BLD-MCNC: 92 MG/DL (ref 70–99)
HCT VFR BLD AUTO: 35.9 % (ref 35–47)
HGB BLD-MCNC: 11.7 G/DL (ref 11.7–15.7)
LYMPHOCYTES # BLD MANUAL: 0.7 10E3/UL (ref 0.8–5.3)
LYMPHOCYTES NFR BLD MANUAL: 29 %
MCH RBC QN AUTO: 32.1 PG (ref 26.5–33)
MCHC RBC AUTO-ENTMCNC: 32.6 G/DL (ref 31.5–36.5)
MCV RBC AUTO: 99 FL (ref 78–100)
MONOCYTES # BLD MANUAL: 0.4 10E3/UL (ref 0–1.3)
MONOCYTES NFR BLD MANUAL: 15 %
NEUTROPHILS # BLD MANUAL: 1.3 10E3/UL (ref 1.6–8.3)
NEUTROPHILS NFR BLD MANUAL: 52 %
PLAT MORPH BLD: ABNORMAL
PLATELET # BLD AUTO: 256 10E3/UL (ref 150–450)
POTASSIUM BLD-SCNC: 4.1 MMOL/L (ref 3.4–5.3)
PROT SERPL-MCNC: 6.6 G/DL (ref 6.8–8.8)
RBC # BLD AUTO: 3.64 10E6/UL (ref 3.8–5.2)
RBC MORPH BLD: ABNORMAL
SODIUM SERPL-SCNC: 137 MMOL/L (ref 133–144)
WBC # BLD AUTO: 2.5 10E3/UL (ref 4–11)

## 2022-03-15 PROCEDURE — 36591 DRAW BLOOD OFF VENOUS DEVICE: CPT | Mod: ZL

## 2022-03-15 PROCEDURE — 250N000011 HC RX IP 250 OP 636: Performed by: INTERNAL MEDICINE

## 2022-03-15 PROCEDURE — 85027 COMPLETE CBC AUTOMATED: CPT | Mod: ZL | Performed by: NURSE PRACTITIONER

## 2022-03-15 PROCEDURE — G0463 HOSPITAL OUTPT CLINIC VISIT: HCPCS | Mod: 25

## 2022-03-15 PROCEDURE — G0463 HOSPITAL OUTPT CLINIC VISIT: HCPCS

## 2022-03-15 PROCEDURE — 99417 PROLNG OP E/M EACH 15 MIN: CPT | Performed by: INTERNAL MEDICINE

## 2022-03-15 PROCEDURE — 99215 OFFICE O/P EST HI 40 MIN: CPT | Performed by: INTERNAL MEDICINE

## 2022-03-15 PROCEDURE — 82310 ASSAY OF CALCIUM: CPT | Mod: ZL

## 2022-03-15 RX ORDER — HEPARIN SODIUM (PORCINE) LOCK FLUSH IV SOLN 100 UNIT/ML 100 UNIT/ML
5 SOLUTION INTRAVENOUS
Status: CANCELLED | OUTPATIENT
Start: 2022-03-15

## 2022-03-15 RX ORDER — HEPARIN SODIUM (PORCINE) LOCK FLUSH IV SOLN 100 UNIT/ML 100 UNIT/ML
5 SOLUTION INTRAVENOUS
Status: DISCONTINUED | OUTPATIENT
Start: 2022-03-15 | End: 2022-03-15 | Stop reason: HOSPADM

## 2022-03-15 RX ADMIN — HEPARIN 5 ML: 100 SYRINGE at 15:43

## 2022-03-15 ASSESSMENT — PAIN SCALES - GENERAL: PAINLEVEL: NO PAIN (0)

## 2022-03-15 NOTE — PROGRESS NOTES
Patients port accessed using non-coring, 19 gauge, 3/4 needle, per facility protocol.     Hand hygiene performed: yes   Mask donned by caregiver: yes  Site prepped with CHG: yes   Labs drawn: yes   Dressing applied using aseptic technique: yes     Port flushed easily, without resistance. Flushed with 10 cc's normal saline.   Immediate blood return noted. 10 cc blood discarded.  Ordered labs obtained and sent to lab.  Port flushed per orders/MAR. Needle removed intact, sterile dressing applied.  Slight pressure applied for 30 seconds.   Patient tolerated port flush well, denies pain nor discomfort at this time. Patient instructed to leave dressing intact for a minimum of one hour, and to call with questions or concerns.  Patient states understanding and is in agreement with this plan. Patient discharged.

## 2022-03-15 NOTE — NURSING NOTE
"Oncology Rooming Note    March 15, 2022 2:50 PM   Anne Marie Nix is a 66 year old female who presents for:    Chief Complaint   Patient presents with     RECHECK     malignant neoplasm of lower outer qudrant of right breast     Initial Vitals: BP (!) 152/83   Pulse 99   Temp 97.9  F (36.6  C)   Resp 18   Ht 1.6 m (5' 3\")   Wt 57.5 kg (126 lb 12.2 oz)   SpO2 99%   BMI 22.46 kg/m   Estimated body mass index is 22.46 kg/m  as calculated from the following:    Height as of this encounter: 1.6 m (5' 3\").    Weight as of this encounter: 57.5 kg (126 lb 12.2 oz). Body surface area is 1.6 meters squared.  No Pain (0) Comment: Data Unavailable   No LMP recorded. Patient is postmenopausal.  Allergies reviewed: Yes  Medications reviewed: Yes    Medications: Medication refills not needed today.  Pharmacy name entered into [facility]:    [id number] - [place], [place] - [address]  [address]  [id number] - [place], [place] - [address] [facility] [address]  [facility][id number] - [place], [place] - [address] [facility] [address]  [facility] - [place], [place] - [address]    Clinical concerns: No concerns       Earnestine Stanton RN            "

## 2022-03-15 NOTE — PATIENT INSTRUCTIONS
We will see you back after your surgery. We would like to order PET scan, MRI of breast and brain to check response to treatment. We will have these completed prior to your  consult with breast surgeon.

## 2022-03-16 ENCOUNTER — INFUSION THERAPY VISIT (OUTPATIENT)
Dept: INFUSION THERAPY | Facility: OTHER | Age: 67
End: 2022-03-16
Attending: INTERNAL MEDICINE
Payer: MEDICARE

## 2022-03-16 VITALS
OXYGEN SATURATION: 98 % | SYSTOLIC BLOOD PRESSURE: 132 MMHG | RESPIRATION RATE: 18 BRPM | WEIGHT: 126.98 LBS | TEMPERATURE: 97.8 F | DIASTOLIC BLOOD PRESSURE: 76 MMHG | BODY MASS INDEX: 22.49 KG/M2 | HEART RATE: 75 BPM

## 2022-03-16 DIAGNOSIS — D70.1 CHEMOTHERAPY-INDUCED NEUTROPENIA (H): Primary | ICD-10-CM

## 2022-03-16 DIAGNOSIS — T45.1X5A CHEMOTHERAPY-INDUCED NEUTROPENIA (H): Primary | ICD-10-CM

## 2022-03-16 DIAGNOSIS — Z17.0 MALIGNANT NEOPLASM OF LOWER-OUTER QUADRANT OF RIGHT BREAST OF FEMALE, ESTROGEN RECEPTOR POSITIVE (H): ICD-10-CM

## 2022-03-16 DIAGNOSIS — C50.511 MALIGNANT NEOPLASM OF LOWER-OUTER QUADRANT OF RIGHT BREAST OF FEMALE, ESTROGEN RECEPTOR POSITIVE (H): ICD-10-CM

## 2022-03-16 LAB — CANCER AG27-29 SERPL-ACNC: 20 U/ML (ref 0–39)

## 2022-03-16 PROCEDURE — 258N000003 HC RX IP 258 OP 636: Performed by: NURSE PRACTITIONER

## 2022-03-16 PROCEDURE — 96523 IRRIG DRUG DELIVERY DEVICE: CPT

## 2022-03-16 PROCEDURE — 96374 THER/PROPH/DIAG INJ IV PUSH: CPT

## 2022-03-16 PROCEDURE — 250N000011 HC RX IP 250 OP 636: Performed by: NURSE PRACTITIONER

## 2022-03-16 PROCEDURE — 250N000011 HC RX IP 250 OP 636: Performed by: INTERNAL MEDICINE

## 2022-03-16 PROCEDURE — 96375 TX/PRO/DX INJ NEW DRUG ADDON: CPT

## 2022-03-16 PROCEDURE — 96413 CHEMO IV INFUSION 1 HR: CPT

## 2022-03-16 PROCEDURE — 36591 DRAW BLOOD OFF VENOUS DEVICE: CPT | Mod: ZL

## 2022-03-16 PROCEDURE — 86300 IMMUNOASSAY TUMOR CA 15-3: CPT | Mod: ZL

## 2022-03-16 RX ORDER — HEPARIN SODIUM (PORCINE) LOCK FLUSH IV SOLN 100 UNIT/ML 100 UNIT/ML
5 SOLUTION INTRAVENOUS
Status: DISCONTINUED | OUTPATIENT
Start: 2022-03-16 | End: 2022-03-16 | Stop reason: HOSPADM

## 2022-03-16 RX ORDER — ONDANSETRON 2 MG/ML
8 INJECTION INTRAMUSCULAR; INTRAVENOUS ONCE
Status: COMPLETED | OUTPATIENT
Start: 2022-03-16 | End: 2022-03-16

## 2022-03-16 RX ADMIN — HEPARIN 5 ML: 100 SYRINGE at 11:08

## 2022-03-16 RX ADMIN — PACLITAXEL 111 MG: 6 INJECTION, SOLUTION INTRAVENOUS at 10:04

## 2022-03-16 RX ADMIN — SODIUM CHLORIDE 250 ML: 9 INJECTION, SOLUTION INTRAVENOUS at 09:25

## 2022-03-16 RX ADMIN — ONDANSETRON 8 MG: 2 INJECTION INTRAMUSCULAR; INTRAVENOUS at 09:26

## 2022-03-16 NOTE — PROGRESS NOTES
Patient is a 66 year old here today for infusion of paclitaxel under the orders of Dr. Rolle.  Right sided power port accessed with 20 gauge 3/4 inch power needle.  Line flushed with 10 cc's normal saline.  Needle secured with sterile transparent dressing.  10 cc's blood discarded, and blood taken for 1 tube of ordered labs.  Patient tolerated well.  Denies pain and discomfort at this time.  Port flushes easily without resistance.    Hand hygiene performed: yes   Mask donned by caregiver: yes Site prepped with CHG: yes Labs drawn: yes Dressing applied using aseptic technique: yes   Component      Latest Ref Rng & Units 3/15/2022   Sodium      133 - 144 mmol/L 137   Potassium      3.4 - 5.3 mmol/L 4.1   Chloride      94 - 109 mmol/L 107   Carbon Dioxide      20 - 32 mmol/L 24   Anion Gap      3 - 14 mmol/L 6   Urea Nitrogen      7 - 30 mg/dL 22   Creatinine      0.52 - 1.04 mg/dL 0.56   Calcium      8.5 - 10.1 mg/dL 9.0   Glucose      70 - 99 mg/dL 92   Alkaline Phosphatase      40 - 150 U/L 58   AST      0 - 45 U/L 25   ALT      0 - 50 U/L 39   Protein Total      6.8 - 8.8 g/dL 6.6 (L)   Albumin      3.4 - 5.0 g/dL 3.4   Bilirubin Total      0.2 - 1.3 mg/dL 0.4   GFR Estimate      >60 mL/min/1.73m2 >90   % Neutrophils      % 52   % Lymphocytes      % 29   % Monocytes      % 15   % Eosinophils      % 0   % Basophils      % 4   Absolute Neutrophil      1.6 - 8.3 10e3/uL 1.3 (L)   Absolute Lymphocytes      0.8 - 5.3 10e3/uL 0.7 (L)   Absolute Monocytes      0.0 - 1.3 10e3/uL 0.4   Absolute Eosinophils      0.0 - 0.7 10e3/uL 0.0   Absolute Basophils      0.0 - 0.2 10e3/uL 0.1   RBC Morphology       Confirmed RBC Indices   Platelet Morphology      Automated Count Confirmed. Platelet morphology is normal. Automated Count Confirmed. Platelet morphology is normal.   WBC      4.0 - 11.0 10e3/uL 2.5 (L)   RBC Count      3.80 - 5.20 10e6/uL 3.64 (L)   Hemoglobin      11.7 - 15.7 g/dL 11.7   Hematocrit      35.0 - 47.0 %  35.9   MCV      78 - 100 fL 99   MCH      26.5 - 33.0 pg 32.1   MCHC      31.5 - 36.5 g/dL 32.6   RDW      10.0 - 15.0 % 14.2   Platelet Count      150 - 450 10e3/uL 256       Paclitaxel dose(s) verified with Theresa BERGMAN RN prior to release of drug.    Patient meets parameters for today's infusion.  Denies questions or concerns regarding today's infusion and/or medications being administered.      Patient identified with two identifiers, order verified, and verbal consent for today's infusion obtained from patient.    1004 IV pump verified with Paclitaxel dose, drug, and rate of administration. Infusion administered per protocol. Patient tolerated infusion well, no signs or symptoms of adverse reaction noted. Patient denies pain nor discomfort.     Needle removed, tip intact. Site clean, dry and intact. Covered with a sterile bandage, slight pressure applied for 30 seconds. Pt instructed to leave bandage intact for a minimum of one hour, and to call with questions or concerns. Patient declines copy of appointments, discharge instructions, and after visit summary (AVS). Patient states understanding, discharged ambulatory.

## 2022-03-16 NOTE — PROGRESS NOTES
Visit Date: 03/15/2022    HISTORY OF PRESENT ILLNESS:  Mrs. Nix returns for followup of invasive lobular carcinoma of the right breast.  We had seen the patient in consultation on 10/11/2021.  At that time, we were asked to see the patient at the request of Dr. Sofy Cobb of General Surgery at [facility] and Kim Wheat, nurse practitioner.  At that time, Ms. Nix was a 66-year-old white female with history of ADHD, whom were asked to evaluate concerning new diagnosis of lobular carcinoma of the right breast.  Apparently, she had palpated a mass in the right breast in 03/2021.  She underwent mammogram on 04/09/21, which revealed an abnormality in the lower outer aspect of the right breast.  Ultrasound-guided biopsy was performed and was read as invasive lobular carcinoma grade 2.  Estrogen receptor was positive, progesterone receptor was negative, and HER-2/kevin was negative.  The patient elected to be seen by Dr. Sofy Cobb at [facility].  She underwent right breast lumpectomy with sentinel lymph node dissection on 08/23/2021.  Pathology revealed that she had a 3 cm, grade 2 invasive lobular carcinoma.  Inferior lateral margin was positive for invasive carcinoma.  Two sentinel lymph nodes were obtained.  One had micrometastases that measured 2 mm.  The other had macrometastases.  She was therefore staged pathologic T2 N1a.  She apparently was considering bilateral mastectomy with reconstruction.  In the interim, she was referred to Dr. Nunez Friday of Medical Oncology at [facility] who saw the patient initially on 09/15/2021.  He felt the patient had early-stage breast cancer with a positive margin and agreed with proceeding with completion mastectomy and would likely not benefit from additional axillary lymph node surgery.  He obtained an Oncotype DX test, which came back extremely high at 53, considered very high risk.  He recommended 4 cycles of dose-dense AC followed by 12  weeks of Taxol.  The patient wanted a second opinion and saw us.  In terms of risk factors for breast cancer, age of menarche was 14.  She had 4 children all before the age of 31; first was at age 22.  She  them all.  She states that she underwent menopause in her 30s and was placed on Provera, but was never on estrogen replacement therapy.  In terms of family history, mother had breast cancer at age 76.  Alcohol history was negative.  Tobacco history was negative.  She was asymptomatic.  She denied any fevers, night sweats, weight loss.  She did have some back pain.  She was initially scheduled to have chemotherapy at [facility] and scheduled to have a port placed.  When we saw the patient, we wanted to adequately stage the patient with a PET scan and MRI of the brain.  This was done on 10/13, and the findings were there were postoperative changes consistent with history of right breast lumpectomy and axillary node dissection, otherwise no evidence of metastatic disease.  MRI of the brain was also negative.  We felt the patient would be a candidate for dose-dense AC x 4 cycles followed by weekly Taxol x 12.  She was started on Adriamycin and Cytoxan after a port was placed on 10/27/2021, completed 4 cycles.  She was given a dose of doxorubicin, or Adriamycin, 60 mg/m2 and cyclophosphamide 600 mg/m2 given every 14 days with Neulasta support given on day 2 at 6 mg subcutaneously.  The patient went on to complete 4 cycles and then started weekly paclitaxel at a dose of 80 mg/m2.  This was begun on 12/22/2021.  Her third cycle of Taxol was delayed due to absolute neutrophil count of 0.9.  Cycle 11 Taxol dose was decreased by 10% due to neutropenia.  She is due to receive her last week of Taxol, which will be her 12th week, tomorrow, which will be her 16th week of chemotherapy.  She has elected to see Dr. Sofy Cobb for bilateral mastectomies given her high-risk breast cancer.  She is also scheduled to  have genetic counseling.  She did have an echocardiogram done on 1/23/2022, which revealed normal left ventricular function of 60-65%.  The patient's major concern was related to neutropenia, which she has had with the Taxol.  She says she does not have any significant neuropathy.  She does suffer from alopecia.  She denies any fevers, night sweats, weight loss, chest pain.  She has not had a recent bone density scan.  She is scheduled to see Dr. Sofy Lynch on 03/24/2022 for consideration of bilateral mastectomy.    PHYSICAL EXAMINATION:    GENERAL:  She is a well-developed, well-nourished, middle-aged white female in no acute distress.  ECOG performance status is 0.  VITAL SIGNS:  Reveal blood pressure 152/83, pulse 99, respirations 18, temperature 97.9.  HEENT:  Atraumatic, normocephalic.  Oropharynx nonerythematous.  NECK:  Supple, no thyromegaly.  LUNGS:  Clear to auscultation and percussion.  HEART:  Regular rhythm.  S1, S2 normal.  BREASTS:  Deferred.  ABDOMEN:  Soft.  Normoactive bowel sounds.  Nondistended.  LYMPHATICS:  No cervical, supraclavicular, axillary or inguinal nodes.  EXTREMITIES:  No edema.  NEUROLOGIC:  Nonfocal.    LABORATORY DATA:  Reveal CBC with white count of 2.5, absolute neutrophil count of 1.3, H and H 11.7 and 35.9. Platelet count is 256.  BUN is 22, creatinine 0.56.  LFTs are normal.  CA 27-29 is pending.    IMPRESSION AND PLAN:  Invasive lobular carcinoma of the right breast, 2 cm breast mass, ER positive, [place] negative, HER2 negative.  Oncotype DX recurrence score was high at 53.  There was 1 sentinel lymph node with macrometastases, the other with micrometastases.  PET scan and MRI of the brain were negative for metastatic disease.  The patient is therefore pathologic stage IIB high-risk invasive lobular carcinoma of the right breast.  Given her high-risk Oncotype score, we recommended adjuvant chemotherapy with dose-dense AC x 4 cycles with Onpro support, followed by 12 weeks of  Taxol 80 mg/m2.  Course was complicated by neutropenia, requiring dose delays with Taxol as well as dose reduction of Taxol by 10%.  Echocardiogram was normal after completion of chemotherapy.  She is scheduled to see Dr. Lynch for likely bilateral mastectomies, and this is probably reasonable given her high-risk breast cancer.  Nonetheless, we would like to absolutely confirm that she is in remission, given her high Oncotype DX score, by obtaining a PET scan and MRI of the brain and also bilateral MRIs of the breast.  We will see the patient subsequently.  We will also see the patient after she completes surgery and proceed with further workup.  At that time, she will likely go on aromatase inhibitor after getting a bone density scan.    TIME SPENT:  Ninety minutes were spent on this patient.  Time was spent reviewing multiple physician provider notes, lab results, imaging results, performing history and physical, documenting history and physical, reviewing chemotherapy orders and also ordering Taxol and ordering PET scan and bilateral MRIs of the breasts.    Mingo Rolle MD        D: 03/15/2022   T: 03/15/2022   MT: [facility]    Name:     JORGE VAZQUEZ  MRN:      [id number]        Account:    [id number]   :      1955           Visit Date: 03/15/2022     Document: [id number]    cc:  Sofy Cobb DO      No

## 2022-03-16 NOTE — PROGRESS NOTES
Anne Marie is a 66 year old who is being evaluated via a billable telephone visit.      What phone number would you like to be contacted at? [phone]  How would you like to obtain your AVS? Mail a copy    Assessment & Plan     Attention deficit hyperactivity disorder (ADHD), predominantly inattentive type  Stable continue current plan of care  Uses Ritalin 2-3 times a day as needed       Prescription drug management         See Patient Instructions    No follow-ups on file.    [facility]   Anne Marie is a 66 year old who presents for the following health issues  accompanied by her alone.    HPI     Medication Followup of Ritalin  Reviewed PDMP: medication as expected     Taking Medication as prescribed: yes    Side Effects:  None    Medication Helping Symptoms:  Yes - she is able to stay focused and follow through with her tasks    She does take melatonin occasionally to help her sleep at night, but will cause her to be drowsy in the morning     Increased fatigue from chemo treatment - she had a decreased WBC and does not want to come into the clinic at this time.  Following up with Dr Cobb to discuss possible bilateral mastectomy     She has started to gain some weight again     She is back with her  - they did get remarried and he is supportive for Anne Marie     She is also set up for Genetic counseling        Review of Systems   CONSTITUTIONAL: NEGATIVE for fever, chills, change in weight  INTEGUMENTARY/SKIN: NEGATIVE for worrisome rashes, moles or lesions  RESP: NEGATIVE for significant cough or SOB  CV: NEGATIVE for chest pain, palpitations or peripheral edema  NEURO: fatigued from chemo   PSYCHIATRIC: ADHD and anxiety      Objective             Physical Exam   alert and no distress  PSYCH: Alert and oriented times 3; coherent speech, normal   rate and volume, able to articulate logical thoughts, able   to abstract reason, no tangential  thoughts, no hallucinations   or delusions  Her affect is normal and pleasant  RESP: No cough, no audible wheezing, able to talk in full sentences  Remainder of exam unable to be completed due to telephone visits                Phone call duration: 12 minutes

## 2022-03-18 ENCOUNTER — VIRTUAL VISIT (OUTPATIENT)
Dept: FAMILY MEDICINE | Facility: OTHER | Age: 67
End: 2022-03-18
Attending: NURSE PRACTITIONER
Payer: MEDICARE

## 2022-03-18 DIAGNOSIS — F90.0 ATTENTION DEFICIT HYPERACTIVITY DISORDER (ADHD), PREDOMINANTLY INATTENTIVE TYPE: Primary | ICD-10-CM

## 2022-03-18 PROCEDURE — G0463 HOSPITAL OUTPT CLINIC VISIT: HCPCS | Mod: 25,TEL

## 2022-03-18 PROCEDURE — 99442 PR PHYSICIAN TELEPHONE EVALUATION 11-20 MIN: CPT | Mod: 95 | Performed by: NURSE PRACTITIONER

## 2022-03-18 ASSESSMENT — ANXIETY QUESTIONNAIRES
2. NOT BEING ABLE TO STOP OR CONTROL WORRYING: NOT AT ALL
7. FEELING AFRAID AS IF SOMETHING AWFUL MIGHT HAPPEN: NOT AT ALL
1. FEELING NERVOUS, ANXIOUS, OR ON EDGE: NOT AT ALL
6. BECOMING EASILY ANNOYED OR IRRITABLE: NOT AT ALL
IF YOU CHECKED OFF ANY PROBLEMS ON THIS QUESTIONNAIRE, HOW DIFFICULT HAVE THESE PROBLEMS MADE IT FOR YOU TO DO YOUR WORK, TAKE CARE OF THINGS AT HOME, OR GET ALONG WITH OTHER PEOPLE: NOT DIFFICULT AT ALL
5. BEING SO RESTLESS THAT IT IS HARD TO SIT STILL: NOT AT ALL
3. WORRYING TOO MUCH ABOUT DIFFERENT THINGS: NOT AT ALL
4. TROUBLE RELAXING: NOT AT ALL
GAD7 TOTAL SCORE: 0

## 2022-03-18 ASSESSMENT — PAIN SCALES - GENERAL: PAINLEVEL: NO PAIN (0)

## 2022-03-19 ASSESSMENT — ANXIETY QUESTIONNAIRES: GAD7 TOTAL SCORE: 0

## 2022-03-21 ENCOUNTER — HOSPITAL ENCOUNTER (OUTPATIENT)
Dept: MRI IMAGING | Facility: HOSPITAL | Age: 67
Discharge: HOME | End: 2022-03-21
Attending: INTERNAL MEDICINE
Payer: MEDICARE

## 2022-03-21 ENCOUNTER — MYC MEDICAL ADVICE (OUTPATIENT)
Dept: FAMILY MEDICINE | Facility: OTHER | Age: 67
End: 2022-03-21
Payer: MEDICARE

## 2022-03-21 DIAGNOSIS — Z17.0 MALIGNANT NEOPLASM OF LOWER-OUTER QUADRANT OF RIGHT BREAST OF FEMALE, ESTROGEN RECEPTOR POSITIVE (H): ICD-10-CM

## 2022-03-21 DIAGNOSIS — F90.0 ATTENTION DEFICIT HYPERACTIVITY DISORDER (ADHD), PREDOMINANTLY INATTENTIVE TYPE: ICD-10-CM

## 2022-03-21 DIAGNOSIS — C50.511 MALIGNANT NEOPLASM OF LOWER-OUTER QUADRANT OF RIGHT BREAST OF FEMALE, ESTROGEN RECEPTOR POSITIVE (H): ICD-10-CM

## 2022-03-21 PROCEDURE — G1004 CDSM NDSC: HCPCS

## 2022-03-21 PROCEDURE — A9585 GADOBUTROL INJECTION: HCPCS | Performed by: RADIOLOGY

## 2022-03-21 PROCEDURE — 255N000002 HC RX 255 OP 636: Performed by: RADIOLOGY

## 2022-03-21 PROCEDURE — 70553 MRI BRAIN STEM W/O & W/DYE: CPT | Mod: MG

## 2022-03-21 RX ORDER — GADOBUTROL 604.72 MG/ML
7.5 INJECTION INTRAVENOUS ONCE
Status: COMPLETED | OUTPATIENT
Start: 2022-03-21 | End: 2022-03-21

## 2022-03-21 RX ORDER — METHYLPHENIDATE HYDROCHLORIDE 10 MG/1
10 TABLET ORAL 3 TIMES DAILY
Qty: 90 TABLET | Refills: 0 | Status: SHIPPED | OUTPATIENT
Start: 2022-03-21 | End: 2022-04-26

## 2022-03-21 RX ADMIN — GADOBUTROL 7.5 ML: 604.72 INJECTION INTRAVENOUS at 15:46

## 2022-03-21 NOTE — TELEPHONE ENCOUNTER
Ritalin       Last Written Prescription Date:  2-11-22  Last Fill Quantity: 90,   # refills: 0  Last Office Visit: 3-18-22 virtual   Future Office visit:    Next 5 appointments (look out 90 days)    Mar 29, 2022 11:30 AM  (Arrive by 11:15 AM)  Return Visit with Mingo Rolle MD  [facility] ([facility] [address] [place] [id number]  [phone]

## 2022-03-22 ENCOUNTER — TELEPHONE (OUTPATIENT)
Dept: PET IMAGING | Facility: HOSPITAL | Age: 67
End: 2022-03-22
Payer: MEDICARE

## 2022-03-22 RX ORDER — METHYLPHENIDATE HYDROCHLORIDE 10 MG/1
10 TABLET ORAL 3 TIMES DAILY
Qty: 90 TABLET | Refills: 0 | OUTPATIENT
Start: 2022-03-22

## 2022-03-22 NOTE — TELEPHONE ENCOUNTER
Patient has had this apt in the past and aware of apt prep instructions. Went over the prep prompt and pt to register at 6:30 at [facility].

## 2022-03-23 ENCOUNTER — HOSPITAL ENCOUNTER (OUTPATIENT)
Dept: PET IMAGING | Facility: HOSPITAL | Age: 67
Discharge: HOME | End: 2022-03-23
Attending: INTERNAL MEDICINE | Admitting: INTERNAL MEDICINE
Payer: MEDICARE

## 2022-03-23 DIAGNOSIS — Z17.0 MALIGNANT NEOPLASM OF LOWER-OUTER QUADRANT OF RIGHT BREAST OF FEMALE, ESTROGEN RECEPTOR POSITIVE (H): ICD-10-CM

## 2022-03-23 DIAGNOSIS — C50.511 MALIGNANT NEOPLASM OF LOWER-OUTER QUADRANT OF RIGHT BREAST OF FEMALE, ESTROGEN RECEPTOR POSITIVE (H): ICD-10-CM

## 2022-03-23 PROCEDURE — 343N000001 HC RX 343: Performed by: INTERNAL MEDICINE

## 2022-03-23 PROCEDURE — A9552 F18 FDG: HCPCS | Performed by: INTERNAL MEDICINE

## 2022-03-23 PROCEDURE — G1004 CDSM NDSC: HCPCS | Mod: PS

## 2022-03-23 RX ADMIN — FLUDEOXYGLUCOSE F-18 9.62 MCI.: 500 INJECTION, SOLUTION INTRAVENOUS at 07:23

## 2022-03-29 ENCOUNTER — ONCOLOGY VISIT (OUTPATIENT)
Dept: ONCOLOGY | Facility: OTHER | Age: 67
End: 2022-03-29
Attending: INTERNAL MEDICINE
Payer: MEDICARE

## 2022-03-29 ENCOUNTER — LAB (OUTPATIENT)
Dept: INFUSION THERAPY | Facility: OTHER | Age: 67
End: 2022-03-29
Attending: INTERNAL MEDICINE
Payer: MEDICARE

## 2022-03-29 VITALS
BODY MASS INDEX: 22.7 KG/M2 | DIASTOLIC BLOOD PRESSURE: 64 MMHG | SYSTOLIC BLOOD PRESSURE: 110 MMHG | WEIGHT: 128.09 LBS | HEIGHT: 63 IN | TEMPERATURE: 97.4 F | HEART RATE: 86 BPM | OXYGEN SATURATION: 98 % | RESPIRATION RATE: 18 BRPM

## 2022-03-29 DIAGNOSIS — C50.511 MALIGNANT NEOPLASM OF LOWER-OUTER QUADRANT OF RIGHT BREAST OF FEMALE, ESTROGEN RECEPTOR POSITIVE (H): Primary | ICD-10-CM

## 2022-03-29 DIAGNOSIS — Z17.0 MALIGNANT NEOPLASM OF LOWER-OUTER QUADRANT OF RIGHT BREAST OF FEMALE, ESTROGEN RECEPTOR POSITIVE (H): Primary | ICD-10-CM

## 2022-03-29 LAB
ALBUMIN SERPL-MCNC: 3.4 G/DL (ref 3.4–5)
ALP SERPL-CCNC: 66 U/L (ref 40–150)
ALT SERPL W P-5'-P-CCNC: 47 U/L (ref 0–50)
ANION GAP SERPL CALCULATED.3IONS-SCNC: 4 MMOL/L (ref 3–14)
AST SERPL W P-5'-P-CCNC: 29 U/L (ref 0–45)
BASOPHILS # BLD AUTO: 0.1 10E3/UL (ref 0–0.2)
BASOPHILS NFR BLD AUTO: 2 %
BILIRUB SERPL-MCNC: 0.3 MG/DL (ref 0.2–1.3)
BUN SERPL-MCNC: 23 MG/DL (ref 7–30)
CALCIUM SERPL-MCNC: 9.1 MG/DL (ref 8.5–10.1)
CHLORIDE BLD-SCNC: 110 MMOL/L (ref 94–109)
CO2 SERPL-SCNC: 24 MMOL/L (ref 20–32)
CREAT SERPL-MCNC: 0.56 MG/DL (ref 0.52–1.04)
EOSINOPHIL # BLD AUTO: 0.2 10E3/UL (ref 0–0.7)
EOSINOPHIL NFR BLD AUTO: 5 %
ERYTHROCYTE [DISTWIDTH] IN BLOOD BY AUTOMATED COUNT: 14.6 % (ref 10–15)
GFR SERPL CREATININE-BSD FRML MDRD: >90 ML/MIN/1.73M2
GLUCOSE BLD-MCNC: 92 MG/DL (ref 70–99)
HCT VFR BLD AUTO: 37.2 % (ref 35–47)
HGB BLD-MCNC: 12 G/DL (ref 11.7–15.7)
IMM GRANULOCYTES # BLD: 0 10E3/UL
IMM GRANULOCYTES NFR BLD: 0 %
LDH SERPL L TO P-CCNC: 167 U/L (ref 81–234)
LYMPHOCYTES # BLD AUTO: 0.8 10E3/UL (ref 0.8–5.3)
LYMPHOCYTES NFR BLD AUTO: 20 %
MCH RBC QN AUTO: 32.1 PG (ref 26.5–33)
MCHC RBC AUTO-ENTMCNC: 32.3 G/DL (ref 31.5–36.5)
MCV RBC AUTO: 100 FL (ref 78–100)
MONOCYTES # BLD AUTO: 0.7 10E3/UL (ref 0–1.3)
MONOCYTES NFR BLD AUTO: 18 %
NEUTROPHILS # BLD AUTO: 2.2 10E3/UL (ref 1.6–8.3)
NEUTROPHILS NFR BLD AUTO: 55 %
NRBC # BLD AUTO: 0 10E3/UL
NRBC BLD AUTO-RTO: 0 /100
PLATELET # BLD AUTO: 261 10E3/UL (ref 150–450)
POTASSIUM BLD-SCNC: 4.4 MMOL/L (ref 3.4–5.3)
PROT SERPL-MCNC: 6.6 G/DL (ref 6.8–8.8)
RBC # BLD AUTO: 3.74 10E6/UL (ref 3.8–5.2)
SODIUM SERPL-SCNC: 138 MMOL/L (ref 133–144)
WBC # BLD AUTO: 4.1 10E3/UL (ref 4–11)

## 2022-03-29 PROCEDURE — 250N000011 HC RX IP 250 OP 636: Performed by: INTERNAL MEDICINE

## 2022-03-29 PROCEDURE — 36591 DRAW BLOOD OFF VENOUS DEVICE: CPT | Mod: ZL

## 2022-03-29 PROCEDURE — 99214 OFFICE O/P EST MOD 30 MIN: CPT | Performed by: INTERNAL MEDICINE

## 2022-03-29 PROCEDURE — 96523 IRRIG DRUG DELIVERY DEVICE: CPT

## 2022-03-29 PROCEDURE — 80053 COMPREHEN METABOLIC PANEL: CPT | Mod: ZL

## 2022-03-29 PROCEDURE — G0463 HOSPITAL OUTPT CLINIC VISIT: HCPCS | Performed by: INTERNAL MEDICINE

## 2022-03-29 PROCEDURE — 83615 LACTATE (LD) (LDH) ENZYME: CPT | Mod: ZL | Performed by: INTERNAL MEDICINE

## 2022-03-29 PROCEDURE — 85025 COMPLETE CBC W/AUTO DIFF WBC: CPT | Mod: ZL | Performed by: INTERNAL MEDICINE

## 2022-03-29 RX ORDER — HEPARIN SODIUM (PORCINE) LOCK FLUSH IV SOLN 100 UNIT/ML 100 UNIT/ML
5 SOLUTION INTRAVENOUS
Status: CANCELLED | OUTPATIENT
Start: 2022-03-29

## 2022-03-29 RX ORDER — HEPARIN SODIUM (PORCINE) LOCK FLUSH IV SOLN 100 UNIT/ML 100 UNIT/ML
5 SOLUTION INTRAVENOUS
Status: DISCONTINUED | OUTPATIENT
Start: 2022-03-29 | End: 2022-03-29 | Stop reason: HOSPADM

## 2022-03-29 RX ADMIN — HEPARIN 5 ML: 100 SYRINGE at 12:15

## 2022-03-29 ASSESSMENT — PAIN SCALES - GENERAL: PAINLEVEL: NO PAIN (0)

## 2022-03-29 NOTE — PROGRESS NOTES
Patients right port accessed using non-coring, 19 gauge, 3/4 inch power needle. Port accessed per facility protocol.  Hand hygiene performed: yes   Mask donned by caregiver: yes Site prepped with CHG: yes Labs drawn: yes Dressing applied using aseptic technique: yes Port flushed easily, without resistance. Flushed with 10 cc's normal saline.   Immediate blood return noted. 10 cc blood discarded.  3 vials blood draw and sent to lab for results.  Port flushed with 20 cc 0.9% normal saline and 5 cc heparinized saline.  Needle removed intact, sterile dressing applied.  Slight pressure applied for 30 seconds.   Patient tolerated port flush well, denies pain nor discomfort at this time. Patient instructed to leave dressing intact for a minimum of one hour, and to call with questions or concerns.  Patient states understanding and is in agreement with this plan. Patient discharged ambulatory.

## 2022-03-29 NOTE — PATIENT INSTRUCTIONS

## 2022-03-29 NOTE — PROGRESS NOTES
Visit Date: 03/29/2022    HEMATOLOGY/ONCOLOGY CLINIC NOTE     HISTORY OF PRESENT ILLNESS:  Mrs. Nix returns for followup of invasive lobular carcinoma of the right breast.  She is here to discuss recent imaging results.  We had just seen her on 03/15/2022.  For details, see previous note dated 03/15/2022.  The patient had just seen Dr. Sofy Cobb and she plans to do bilateral nipple-sparing mastectomy with reconstruction.  She felt the patient would not require axillary node dissection but would require adjuvant radiation therapy to the axilla.  When we saw the patient last, we felt we should assess response to chemotherapy by obtaining a PET scan.  This was done on 03/23/2022 and the findings were that there was no evidence of metastatic disease.  FDG uptake in the right axilla had resolved.  She also had an MRI of the brain, which was negative, no evidence of metastatic disease.  She also had bilateral MRIs of the breast and they were negative.  No residual mass or suspicious enhancement in the right breast.  There were no new mass or suspicious enhancement.  The patient otherwise is doing well except she complains of some mild ankle edema.  She is concerned about her white count and would like a CBC.  Otherwise, she denies any fevers, night sweats, weight loss, shortness of breath, headaches, bone pain.  She says she is trying to gain weight for her TRAM flap reconstruction.    PHYSICAL EXAMINATION:    GENERAL:  She is a middle-aged white female in no acute distress.  ECOG performance status is 0.  VITAL SIGNS:  Blood pressure 110/64, pulse 86, respirations 18, temperature 97.4.  HEENT:  Atraumatic, normocephalic.  Oropharynx nonerythematous.  NECK:  Supple.  LUNGS:  Clear to auscultation and percussion.  HEART:  Regular rhythm.  S1, S2 normal.  BREASTS:  Deferred.  ABDOMEN:  Soft, normoactive bowel sounds, nondistended.  LYMPHATICS:  No cervical, supraclavicular, axillary or inguinal nodes.  EXTREMITIES:   Trace ankle edema.  NEUROLOGIC:  Nonfocal.    LABORATORY DATA:  Labs from 2022:  CA 27-29 was 20.  Otherwise, CBC is pending.    IMPRESSION:  Invasive lobular carcinoma of the right breast, 2 cm breast mass, ER positive, [place] negative, HER-2 negative. Oncotype DX recurrence score was high at 50.  There was 1 sentinel lymph node with macrometastases, the other with micrometastases.  PET scan and MRI of the brain were negative for metastatic disease.  The patient therefore has pathologic stage IIB high risk invasive lobular carcinoma of the right breast.  Given her high risk Oncotype score, we recommended adjuvant chemotherapy with dose-dense AC x4 cycles with Onpro support followed by 12 weeks of Taxol 80 mg/m2.  Course was complicated by neutropenia, requiring dose delays in Taxol as well as dose reduction of Taxol by 10%.  Echocardiogram was normal after completion of chemotherapy. PET scan and MRI of the brain were negative.  Bilateral MRI of the breasts were negative.  The patient was scheduled to have bilateral mastectomies, nipple-sparing, performed by Dr. Sofy Cobb with TRAM flap reconstruction simultaneously. We will see the patient after surgery.  At that time, if pathology remains negative for malignancies, she will be a candidate for adjuvant axillary radiation followed by aromatase inhibitor therapy.  She will likely need a bone density scan as well.    Sixty-eight minutes was spent on this patient.  Time was spent reviewing multiple imaging results, discussing imaging results with the patient, performing history and physical, documenting history, ordering followup labs.    Mingo Rolle MD        D: 2022   T: 2022   MT: [facility]    Name:     JORGE VAZQUEZ  MRN:      [id number]        Account:    [id number]   :      1955           Visit Date: 2022     Document: [id number]

## 2022-03-29 NOTE — NURSING NOTE
"Oncology Rooming Note    March 29, 2022 11:27 AM   Anne Marie Nix is a 66 year old female who presents for:    Chief Complaint   Patient presents with     RECHECK     malignant neoplasm of the right breast     Initial Vitals: /64   Pulse 86   Temp 97.4  F (36.3  C)   Resp 18   Ht 1.6 m (5' 3\")   Wt 58.1 kg (128 lb 1.4 oz)   SpO2 98%   BMI 22.69 kg/m   Estimated body mass index is 22.69 kg/m  as calculated from the following:    Height as of this encounter: 1.6 m (5' 3\").    Weight as of this encounter: 58.1 kg (128 lb 1.4 oz). Body surface area is 1.61 meters squared.  No Pain (0) Comment: Data Unavailable   No LMP recorded. Patient is postmenopausal.  Allergies reviewed: Yes  Medications reviewed: Yes    Medications: MEDICATION REFILLS NEEDED TODAY. Provider was NOT notified.  Pharmacy name entered into [facility]:    [facility] #[id number] - [place], [place] - [address]  [id number] - [place], [place] - [address] [facility] [address]  [id number] - [facility] [address] [facility] [address]  [facility] - [place], [place] - [address]    Clinical concerns: No concerns Dr. Rolle was notified.      Earnestine Stanton RN            "

## 2022-04-04 ENCOUNTER — VIRTUAL VISIT (OUTPATIENT)
Dept: ONCOLOGY | Facility: CLINIC | Age: 67
End: 2022-04-04
Attending: NURSE PRACTITIONER
Payer: MEDICARE

## 2022-04-04 DIAGNOSIS — C50.511 MALIGNANT NEOPLASM OF LOWER-OUTER QUADRANT OF RIGHT BREAST OF FEMALE, ESTROGEN RECEPTOR POSITIVE (H): Primary | ICD-10-CM

## 2022-04-04 DIAGNOSIS — Z17.0 MALIGNANT NEOPLASM OF LOWER-OUTER QUADRANT OF RIGHT BREAST OF FEMALE, ESTROGEN RECEPTOR POSITIVE (H): Primary | ICD-10-CM

## 2022-04-04 DIAGNOSIS — Z80.41 FAMILY HISTORY OF MALIGNANT NEOPLASM OF OVARY: ICD-10-CM

## 2022-04-04 DIAGNOSIS — Z80.3 FAMILY HISTORY OF MALIGNANT NEOPLASM OF BREAST: ICD-10-CM

## 2022-04-04 PROCEDURE — 96040 HC GENETIC COUNSELING, EACH 30 MINUTES: CPT | Mod: 95,GT | Performed by: GENETIC COUNSELOR, MS

## 2022-04-04 NOTE — PROGRESS NOTES
Anne Marie Nix is a 66 year old who is being evaluated via a billable video visit.      How would you like to obtain your AVS? MyChart  If the video visit is dropped, the invitation should be resent by: Text to cell phone: [phone]  Will anyone else be joining your video visit? No

## 2022-04-04 NOTE — LETTER
Cancer Risk Management  Program Locations    [facility]  [facility]  [facility]  [facility]  Mailing Address  Cancer Risk Management Program  [facility]  [address]  [place] [address]    New patient appointments  [phone]  April 5, 2022    Anne Marie Nix  [id number] [id number]      Dear Anne Marie,    It was a pleasure speaking with you over video for genetic counseling on 4/4/2022. Here is a copy of the progress note from our discussion. If you have any additional questions, please feel free to call.    Referring Provider: Valentina Avila NP    Presenting Information:   I spoke with Anne Marie Nix over video today for genetic counseling to discuss her personal and family history of cancer. With her permission, this appointment was conducted virtually due to COVID-19 precautions. We talked today to review this history, cancer screening recommendations, and available genetic testing options.    Personal History:  Anne Marie is a 66 year old female. She was diagnosed with a right breast cancer at age 65 in April 2021 ([facility], ER positive, DC negative, HER2 negative). She underwent lumpectomy on 08/23/2021. She had adjuvant chemotherapy and is planning for bilateral mastectomy.    She had her first menstrual period at age 17, her first child at age 22, and is postmenopausal (late 30s). Anne Marie has her ovaries, fallopian tubes and uterus in place. She reports that she has used progesterone cream. She has also used the depo provera shot. Anne Marie has not had a colonoscopy. She had a thyroid biopsy on 6/6/13. Anne Marie reported no history of tobacco use and no alcohol use since age 21. She reports possible exposures to mold in her home in the past.    Family History: (Please see scanned pedigree for detailed family history  "information)  Children:    Her daughter is 42 years old and was diagnosed with a granulosa cell tumor of the ovary approximately 7 years ago. She has had surgery and is monitoring for this. She has possibly had some genetic testing, although the details of this are unclear. Anne Marie will see if she can find out some additional information.    Her son (Anne Marie's grandson) had a tumor removed from his earlobe about 6 months ago. He will be having further evaluations related to this.    Another daughter is 40 years old and recently had a breast biopsy that was benign. She reports that the lesion was larger than originally thought and she may be having this removed.    A third daughter is 38 years old and has a history of a \"fibroid cyst\" removed from her armpit.    She has one other daughter who is 41 years old with no known history of cancer.  Siblings:    Her brother is 59 years old and has a history of skin cancer (nose) 20 years ago.    Another brother is 68 years old and was recently diagnosed with lung cancer at age 68. He has a history of smoking.  Maternal:    Her mother was diagnosed with breast cancer at age 68. Treatment included a clinical trial with radioactive seed. She was then diagnosed with lung cancer at age 78. She had a history of heavy smoking (quit in her 50s). She passed away at age 82. She also had a hysterectomy and possible bilateral salpingo-oophorectomy around age 41 due to endometriosis.    Her aunt was diagnosed with breast cancer at age 80. She passed away at 96.    Her daughter (Anne Marie's cousin) has a history of breast cancer in her early 40s. Anne Marie believes that she had negative genetic testing.     Another daughter also has a history of breast cancer in her late 50s.    Her uncle was diagnosed with lung cancer and passed away at age 65. He had a history of smoking.     His daughter has a history of breast cancer in her late 30s.     Two other uncles had lung cancer and she believes multiple " other cancers. Another two uncles possibly also had cancers. She will find out more information about the cancers in these relatives.    Her grandfather was diagnosed with colon cancer and passed away in his early 60s.  Paternal:    Her father passed away at age 51. He worked as a  and was killed on duty during a domestic dispute.     Her uncle was diagnosed with brain cancer in his late 70s and passed away.    His daughter (Anne Marie's cousin) was diagnosed with ovarian cancer and passed away in her early 50s.    Her maternal ethnicity is Slovak, German [place], possibly . Her paternal ethnicity is unknown, possibly . There is no known Ashkenazi Christianity ancestry on either side of her family.     Discussion:    Anne Marie's personal and family history of cancer is suggestive of a hereditary cancer syndrome.    We reviewed the features of sporadic, familial, and hereditary cancers. In looking at Anne Marie's family history, it is possible that a cancer susceptibility gene is present due to her diagnosis of breast cancer and family history of breast cancer in her mother, maternal aunt, and three maternal cousins. She also has a family history of ovarian cancer in her paternal cousin and a granulosa cell tumor of the ovary in her daughter.    We discussed the natural history and genetics of hereditary cancer. Based on her personal and family history, we discussed the BRCA1 and BRCA2 genes. Mutations in these genes cause a condition known as Hereditary Breast and Ovarian Cancer syndrome (HBOC). Women with a mutation in either of these genes are at increased risk for breast and ovarian cancer. There is also an increased risk for a second primary breast cancer. Men with a mutation in either of these genes are at increased risk for breast and prostate cancer. Both women and men may also be at increased risk for pancreatic cancer and melanoma. A detailed handout regarding these genes and other  genes in which mutations are associated with an increased risk for breast and gynecologic cancer will be provided to Anne Marie via [employer] and can be found in the after visit summary. Topics included: inheritance pattern, cancer risks, cancer screening recommendations, and also risks, benefits and limitations of testing.    Based on her personal and family history, Anne Marie meets current National Comprehensive Cancer Network (NCCN) criteria for genetic testing of high-penetrance breast cancer susceptibility genes, specifically, BRCA1, BRCA2, CDH1, PALB2, PTEN, and TP53.     We discussed that there are additional genes that could cause increased risk for breast, ovarian, colon, and other cancers. As many of these genes present with overlapping features in a family and accurate cancer risk cannot always be established based upon the pedigree analysis alone, it would be reasonable for Anne Marie to consider panel genetic testing to analyze multiple genes at once.    We reviewed genetic testing options for Anne Marie based on her personal and family history: a panel of genes associated with an increased risk for certain cancers, or larger panel options to include genes associated with increased risk for multiple different cancer types.    Medical Management: For Anne Marie, we reviewed that the information from genetic testing may determine:    additional cancer screening for which Anne Marie or her family members may qualify (i.e. mammogram and breast MRI, more frequent colonoscopies, more frequent dermatologic exams, etc.),    options for risk reducing surgeries Anne Marie or her family members could consider (i.e. bilateral mastectomy, surgery to remove ovaries and/or uterus, etc.),      and targeted chemotherapies if she were to develop certain cancers in the future (i.e. immunotherapy for individuals with Man syndrome, PARP inhibitors, etc.).   At the end of our discussion today, Anne Marie elected to take some additional time to think about the  option of genetic testing and implications for herself and her family members.   If she decides not to have genetic testing, she should continue with her cancer screenings based on her personal and family history:  She should continue with her breast cancer treatment as recommended by her oncology team.   Anne Marie s close female relatives remain at increased risk for breast cancer given their family history. Breast cancer screening is generally recommended to begin approximately 10 years younger than the earliest age of breast cancer diagnosis in the family, or at age 40, whichever comes first. Breast screening options should be discussed with an individual's primary care provider and a genetic counselor, to determine at what age to begin screening, what screening is appropriate, and if additional screening (such as breast MRI) is necessary based on personal/family history factors.    Due to Anne Marie's family history of ovarian cancer, Anne Marie and other close paternal female relatives remain at slightly increased risk for ovarian cancer. We discussed available ovarian cancer screening (pelvic exams, CA-125 blood tests, and transvaginal ultrasounds) as well as the significant limitations of this screening. As such, this screening is not typically recommended. That being said, women in this family should discuss this screening and the signs and symptoms of ovarian cancer with their primary OB/GYN provider, as they may have individualized recommendations.  Other population cancer screening options, such as those recommended by the American Cancer Society and the National Comprehensive Cancer Network (NCCN), are also appropriate for Anne Marie and her family. These screening recommendations may change if there are changes to Anne Marie's personal and/or family history of cancer. Final screening recommendations should be made by each individual's primary care provider.  Screening recommendations may change if there are changes in her  personal or family history, or if there is a gene mutation detected in Anne Marie or her family members.    Plan:  1) No genetic testing was ordered today. Anne Marie would like to take some time to think about the option of genetic testing and implications for herself and her family members.  2) She was encouraged to contact me with any additional questions or concerns, or if she decides that she would like to proceed with genetic testing.  3) If she decides not to have testing, she should continue with her cancer screenings based on her personal and family history as outlined above.    Veronika Monk MS, [facility]  Licensed, Certified Genetic Counselor  Office: [phone]  Email: [email or web address]

## 2022-04-04 NOTE — PROGRESS NOTES
4/4/2022    Anne Marie is a 66 year old who is being evaluated via a billable video visit.      Video-Visit Details    Type of service: Video Visit    Video Start Time: 12:00 pm  Video End Time: 1:16 pm    Time spent over video: 76 minutes     Originating Location (pt. Location): Home    Distant Location (provider location): Cancer Risk Management Program    Platform used for Video Visit: [employer]    Referring Provider: Valentina Avila NP    Presenting Information:   I spoke with Anne Marie Nix over video today for genetic counseling to discuss her personal and family history of cancer. With her permission, this appointment was conducted virtually due to COVID-19 precautions. We talked today to review this history, cancer screening recommendations, and available genetic testing options.    Personal History:  Anne Marie is a 66 year old female. She was diagnosed with a right breast cancer at age 65 in April 2021 ([facility], ER positive, MI negative, HER2 negative). She underwent lumpectomy on 08/23/2021. She had adjuvant chemotherapy and is planning for bilateral mastectomy.    She had her first menstrual period at age 17, her first child at age 22, and is postmenopausal (late 30s). Anne Marie has her ovaries, fallopian tubes and uterus in place. She reports that she has used progesterone cream. She has also used the depo provera shot. Anne Marie has not had a colonoscopy. She had a thyroid biopsy on 6/6/13. Anne Marie reported no history of tobacco use and no alcohol use since age 21. She reports possible exposures to mold in her home in the past.    Family History: (Please see scanned pedigree for detailed family history information)  Children:    Her daughter is 42 years old and was diagnosed with a granulosa cell tumor of the ovary approximately 7 years ago. She has had surgery and is monitoring for this. She has possibly had some genetic testing, although the details of this are unclear. Anne Marie will see if she can find out some  "additional information.    Her son (Anne Marie's grandson) had a tumor removed from his earlobe about 6 months ago. He will be having further evaluations related to this.    Another daughter is 40 years old and recently had a breast biopsy that was benign. She reports that the lesion was larger than originally thought and she may be having this removed.    A third daughter is 38 years old and has a history of a \"fibroid cyst\" removed from her armpit.    She has one other daughter who is 41 years old with no known history of cancer.  Siblings:    Her brother is 59 years old and has a history of skin cancer (nose) 20 years ago.    Another brother is 68 years old and was recently diagnosed with lung cancer at age 68. He has a history of smoking.  Maternal:    Her mother was diagnosed with breast cancer at age 68. Treatment included a clinical trial with radioactive seed. She was then diagnosed with lung cancer at age 78. She had a history of heavy smoking (quit in her 50s). She passed away at age 82. She also had a hysterectomy and possible bilateral salpingo-oophorectomy around age 40 due to endometriosis.    Her aunt was diagnosed with breast cancer at age 80. She passed away at 96.    Her daughter (Anne Marie's cousin) has a history of breast cancer in her early 40s. Anne Marie believes that she had negative genetic testing.     Another daughter also has a history of breast cancer in her late 50s.    Her uncle was diagnosed with lung cancer and passed away at age 65. He had a history of smoking.     His daughter has a history of breast cancer in her late 30s.     Two other uncles had lung cancer and she believes multiple other cancers. Another two uncles possibly also had cancers. She will find out more information about the cancers in these relatives.    Her grandfather was diagnosed with colon cancer and passed away in his early 60s.  Paternal:    Her father passed away at age 51. He worked as a  and was killed on " duty during a domestic dispute.     Her uncle was diagnosed with brain cancer in his late 70s and passed away.    His daughter (Anne Marie's cousin) was diagnosed with ovarian cancer and passed away in her early 50s.    Her maternal ethnicity is Nepali, Russian [place], possibly . Her paternal ethnicity is unknown, possibly . There is no known Ashkenazi Evangelical ancestry on either side of her family.     Discussion:    Anne Marie's personal and family history of cancer is suggestive of a hereditary cancer syndrome.    We reviewed the features of sporadic, familial, and hereditary cancers. In looking at Anne Marie's family history, it is possible that a cancer susceptibility gene is present due to her diagnosis of breast cancer and family history of breast cancer in her mother, maternal aunt, and three maternal cousins. She also has a family history of ovarian cancer in her paternal cousin and a granulosa cell tumor of the ovary in her daughter.    We discussed the natural history and genetics of hereditary cancer. Based on her personal and family history, we discussed the BRCA1 and BRCA2 genes. Mutations in these genes cause a condition known as Hereditary Breast and Ovarian Cancer syndrome (HBOC). Women with a mutation in either of these genes are at increased risk for breast and ovarian cancer. There is also an increased risk for a second primary breast cancer. Men with a mutation in either of these genes are at increased risk for breast and prostate cancer. Both women and men may also be at increased risk for pancreatic cancer and melanoma. A detailed handout regarding these genes and other genes in which mutations are associated with an increased risk for breast and gynecologic cancer will be provided to Anne Marie via [employer] and can be found in the after visit summary. Topics included: inheritance pattern, cancer risks, cancer screening recommendations, and also risks, benefits and limitations of  testing.    Based on her personal and family history, Anne Marie meets current National Comprehensive Cancer Network (NCCN) criteria for genetic testing of high-penetrance breast cancer susceptibility genes, specifically, BRCA1, BRCA2, CDH1, PALB2, PTEN, and TP53.     We discussed that there are additional genes that could cause increased risk for breast, ovarian, colon, and other cancers. As many of these genes present with overlapping features in a family and accurate cancer risk cannot always be established based upon the pedigree analysis alone, it would be reasonable for Anne Marie to consider panel genetic testing to analyze multiple genes at once.    We reviewed genetic testing options for Anne Marie based on her personal and family history: a panel of genes associated with an increased risk for certain cancers, or larger panel options to include genes associated with increased risk for multiple different cancer types.    Medical Management: For Anne Marie, we reviewed that the information from genetic testing may determine:    additional cancer screening for which Anne Marie or her family members may qualify (i.e. mammogram and breast MRI, more frequent colonoscopies, more frequent dermatologic exams, etc.),    options for risk reducing surgeries Anne Marie or her family members could consider (i.e. bilateral mastectomy, surgery to remove ovaries and/or uterus, etc.),      and targeted chemotherapies if she were to develop certain cancers in the future (i.e. immunotherapy for individuals with Man syndrome, PARP inhibitors, etc.).   At the end of our discussion today, Anne Marie elected to take some additional time to think about the option of genetic testing and implications for herself and her family members.   If she decides not to have genetic testing, she should continue with her cancer screenings based on her personal and family history:  She should continue with her breast cancer treatment as recommended by her oncology team.    Anne Marie s close female relatives remain at increased risk for breast cancer given their family history. Breast cancer screening is generally recommended to begin approximately 10 years younger than the earliest age of breast cancer diagnosis in the family, or at age 40, whichever comes first. Breast screening options should be discussed with an individual's primary care provider and a genetic counselor, to determine at what age to begin screening, what screening is appropriate, and if additional screening (such as breast MRI) is necessary based on personal/family history factors.    Due to Anne Marie's family history of ovarian cancer, Anne Marie and other close paternal female relatives remain at slightly increased risk for ovarian cancer. We discussed available ovarian cancer screening (pelvic exams, CA-125 blood tests, and transvaginal ultrasounds) as well as the significant limitations of this screening. As such, this screening is not typically recommended. That being said, women in this family should discuss this screening and the signs and symptoms of ovarian cancer with their primary OB/GYN provider, as they may have individualized recommendations.  Other population cancer screening options, such as those recommended by the American Cancer Society and the National Comprehensive Cancer Network (NCCN), are also appropriate for Anne Marie and her family. These screening recommendations may change if there are changes to Anne Marie's personal and/or family history of cancer. Final screening recommendations should be made by each individual's primary care provider.  Screening recommendations may change if there are changes in her personal or family history, or if there is a gene mutation detected in Anne Marie or her family members.    Plan:  1) No genetic testing was ordered today. Anne Marie would like to take some time to think about the option of genetic testing and implications for herself and her family members.  2) She was encouraged to  contact me with any additional questions or concerns, or if she decides that she would like to proceed with genetic testing.  3) If she decides not to have testing, she should continue with her cancer screenings based on her personal and family history as outlined above.    Veronika Monk MS, [facility]  Licensed, Certified Genetic Counselor  Office: [phone]  Email: [email or web address]

## 2022-04-05 NOTE — PATIENT INSTRUCTIONS
Assessing Cancer Risk  Only about 5-10% of cancers are thought to be due to an inherited cancer susceptibility gene.    These families often have:    Several people with the same or related types of cancer    Cancers diagnosed at a young age (before age 50)    Individuals with more than one primary cancer    Multiple generations of the family affected with cancer    Some people may be candidates for genetic testing of more than one gene.  For these families, genetic testing using a cancer panel may be offered.  These panels will test different genes known to increase the risk for breast, ovarian, uterine, and/or other cancers. All of the genes discussed below have published clinical management guidelines for individuals who are found to carry a mutation. The purpose of this handout is to serve as a brief summary of the genes analyzed by the panels used to inquire about hereditary breast and gynecologic cancer:  KIYA, BRCA1, BRCA2, BRIP1, CDH1, CHEK2, MLH1, MSH2, MSH6, PMS2, EPCAM, PTEN, PALB2, RAD51C, RAD51D, and TP53.  ______________________________________________________________________________  Hereditary Breast and Ovarian Cancer Syndrome   (BRCA1 and BRCA2)  A single mutation in one of the copies of BRCA1 or BRCA2 increases the risk for breast and ovarian cancer, among others.  The risk for pancreatic cancer and melanoma may also be slightly increased in some families.  The chart below shows the chance that someone with a BRCA mutation would develop cancer in his or her lifetime1,2,3,4.        A person s ethnic background is also important to consider, as individuals of Ashkenazi Adventist ancestry have a higher chance of having a BRCA gene mutation.  There are three BRCA mutations that occur more frequently in this population.    Man Syndrome   (MLH1, MSH2, MSH6, PMS2, and EPCAM)  Currently five genes are known to cause Man Syndrome: MLH1, MSH2, MSH6, PMS2, and EPCAM.  A single mutation in one of the  Man Syndrome genes increases the risk for colon, endometrial, ovarian, and stomach cancers.  Other cancers that occur less commonly in Man Syndrome include urinary tract, skin, and brain cancers.  The chart below shows the chance that a person with Man syndrome would develop cancer in his or her lifetime5.      *Cancer risk varies depending on Man syndrome gene found    Cowden Syndrome   (PTEN)  Cowden syndrome is a hereditary condition that increases the risk for breast, thyroid, endometrial, colon, and kidney cancer.  Cowden syndrome is caused by a mutation in the PTEN gene.  A single mutation in one of the copies of PTEN causes Cowden syndrome and increases cancer risk.  The chart below shows the chance that someone with a PTEN mutation would develop cancer in their lifetime6,7.  Other benign features seen in some individuals with Cowden syndrome include benign skin lesions (facial papules, keratoses, lipomas), learning disability, autism, thyroid nodules, colon polyps, and larger head size.      *One recent study found breast cancer risk to be increased to 85%    Li-Fraumeni Syndrome   (TP53)  Li-Fraumeni Syndrome (LFS) is a cancer predisposition syndrome caused by a mutation in the TP53 gene. A single mutation in one of the copies of TP53 increases the risk for multiple cancers. Individuals with LFS are at an increased risk for developing cancer at a young age. The lifetime risk for development of a LFS-associated cancer is 50% by age 30 and 90% by age 60.   Core Cancers: Sarcomas, Breast, Brain, Lung, Leukemias/Lymphomas, Adrenocortical carcinomas  Other Cancers: Gastrointestinal, Thyroid, Skin, Genitourinary    Hereditary Diffuse Gastric Cancer   (CDH1)  Currently, one gene is known to cause hereditary diffuse gastric cancer (HDGC): CDH1.  Individuals with HDGC are at increased risk for diffuse gastric cancer and lobular breast cancer. Of people diagnosed with HDGC, 30-50% have a mutation in the CDH1  gene.  This suggests there are likely other genes that may cause HDGC that have not been identified yet.      Lifetime Cancer Risks    General Population HDGC    Diffuse Gastric  <1% ~80%   Breast 12% 39-52%         Additional Genes  KIYA  KIYA is a moderate-risk breast cancer gene. Women who have a mutation in KIYA can have between a 2-4 fold increased risk for breast cancer compared to the general population8. KIYA mutations have also been associated with increased risk for pancreatic cancer, however an estimate of this cancer risk is not well understood9. Individuals who inherit two KIYA mutations have a condition called ataxia-telangiectasia (AT).  This rare autosomal recessive condition affects the nervous system and immune system, and is associated with progressive cerebellar ataxia beginning in childhood.  Individuals with ataxia-telangiectasia often have a weakened immune system and have an increased risk for childhood cancers.    PALB2  Mutations in PALB2 have been shown to increase the risk of breast cancer up to 33-58% in some families; where individuals fall within this risk range is dependent upon family [id number]. PALB2 mutations have also been associated with increased risk for pancreatic cancer, although this risk has not been quantified yet.  Individuals who inherit two PALB2 mutations--one from their mother and one from their father--have a condition called Fanconi Anemia.  This rare autosomal recessive condition is associated with short stature, developmental delay, bone marrow failure, and increased risk for childhood cancers.    CHEK2   CHEK2 is a moderate-risk breast cancer gene.  Women who have a mutation in CHEK2 have around a 2-fold increased risk for breast cancer compared to the general population, and this risk may be higher depending upon family history.11,12,13 Mutations in CHEK2 have also been shown to increase the risk of a number of other cancers, including colon and prostate, however  these cancer risks are currently not well understood.    BRIP1, RAD51C and RAD51D  Mutations in BRIP1, RAD51C, and RAD51D have been shown to increase the risk of ovarian cancer and possibly female breast cancer as well14,15 .       Lifetime Cancer Risk    General Population BRIP1 RAD51C RAD51D   Ovarian 1-2% ~5-8% ~5-9% ~7-15%           Inheritance  All of the cancer syndromes reviewed above are inherited in an autosomal dominant pattern.  This means that if a parent has a mutation, each of his or her children will have a 50% chance of inheriting that same mutation.  Therefore, each child--male or female--would have a 50% chance of being at increased risk for developing cancer.      Image obtained from Genetics Home Reference, 2013     Mutations in some genes can occur de mely, which means that a person s mutation occurred for the first time in them and was not inherited from a parent.  Now that they have the mutation, however, it can be passed on to future generations.    Genetic Testing  Genetic testing involves a blood test and will look at the genetic information in the KIYA, BRCA1, BRCA2, BRIP1, CDH1, CHEK2, MLH1, MSH2, MSH6, PMS2, EPCAM, PTEN, PALB2, RAD51C, RAD51D, and TP53 genes for any harmful mutations that are associated with increased cancer risk.  If possible, it is recommended that the person(s) who has had cancer be tested before other family members.  That person will give us the most useful information about whether or not a specific gene is associated with the cancer in the family.    Results  There are three possible results of genetic testing:    Positive--a harmful mutation was identified in one or more of the genes    Negative--no mutation was identified in any of the genes on this panel    Variant of unknown significance--a variation in one of the genes was identified, but it is unclear how this impacts cancer risk in the family    Advantages and Disadvantages   There are advantages and  disadvantages to genetic testing.    Advantages    May clarify your cancer risk    Can help you make medical decisions    May explain the cancers in your family    May give useful information to your family members (if you share your results)    Disadvantages    Possible negative emotional impact of learning about inherited cancer risk    Uncertainty in interpreting a negative test result in some situations    Possible genetic discrimination concerns (see below)    Genetic Information Nondiscrimination Act (BRINDA)  BRINDA is a federal law that protects individuals from health insurance or employment discrimination based on a genetic test result alone.  Although rare, there are currently no legal discrimination protections in terms of life insurance, long term care, or disability insurances.  Visit the [employer] website to learn more.    Reducing Cancer Risk  All of the genes described above have nationally recognized cancer screening guidelines that would be recommended for individuals who test positive.  In addition to increased cancer screening, surgeries may be offered or recommended to reduce cancer risk.  Recommendations are based upon an individual s genetic test result as well as their personal and family history of cancer.    Questions to Think About Regarding Genetic Testing:    What effect will the test result have on me and my relationship with my family members if I have an inherited gene mutation?  If I don t have a gene mutation?    Should I share my test results, and how will my family react to this news, which may also affect them?    Are my children ready to learn new information that may one day affect their own health?    Hereditary Cancer Resources    FORCE: Facing Our Risk of Cancer Empowered facingourrisk.org   Bright Pink bebrightpink.org   Li-Fraumeni Syndrome Association lfsassociation.org   PTEN World PTENworld.com   No stomach for cancer, Inc.  nostomachforcancer.org   Stomach cancer relief network Scrnet.org   Collaborative Group of the Americas on Inherited Colorectal Cancer (CGA) cgaicc.com    Cancer Care cancercare.org   American Cancer Society (ACS) cancer.org   National Cancer Fabius (NCI) cancer.gov     Please call us if you have any questions or concerns.   Cancer Risk Management Program [phone] ([phone])  ? Pawan hPipps, MS [facility] [phone]  ? Laura Rahman, MS, [facility] [phone]  ? Jacquelyn Tai, MS, [facility]  [phone]  ? Nallely Ryanelodia, MS, [facility]  [phone]  ? Veronika Lacho, MS, [facility]  [phone]  ? Andria Manish, MS, [facility] [phone]  ? Tasia Sissy, MS, [facility] [phone]    References  1. Ebony DOMINGO, Kashif PDP, Tam S, Lita FORTE, Sergio JE, Ethel JL, Martha N, Lily H, Brandi O, Aviva A, Jada B, Radiboo P, Mangamalkijuanjose S, Jannie DM, Ervin N, Florencia E, Anny H, Abdi E, Brandy J, Gronprashant J, Romulo B, Margarita H, Thorlacius S, Eerola H, Dharmesh H, Bianca K, Chance OP. Average risks of breast and ovarian cancer associated with BRCA1 or BRCA2 mutations detected in case series unselected for family history: a combined analysis of 222 studies. Am J Hum Karen. 2003;72:1117-30.  2. Saw N, Renetta M, Haseeb G.  BRCA1 and BRCA2 Hereditary Breast and Ovarian Cancer. Gene Reviews online. 2013.  3. Selvin YC, Ladan S, Malissa G, Leone S. Breast cancer risk among male BRCA1 and BRCA2 mutation carriers. J Natl Cancer Inst. 2007;99:1811-4.  4. Abdias ROSS, Mine I, Aaron J, Amilcar E, Divya ER, Radha F. Risk of breast cancer in male BRCA2 carriers. J Med Karen. 2010;47:710-1.  5. National Comprehensive Cancer Network. Clinical practice guidelines in oncology, colorectal cancer screening. Available online (registration required). 2015.  6. Harsha CHILD, Sue J, Angelina J, Luh CORNEJO, Donnie COLLADO, Eng C. Lifetime cancer risks in individuals with germline PTEN mutations. Clin Cancer Res. 2012;18:400-7.  7. Bret CONWAY.  Cowden Syndrome: A Critical Review of the Clinical Literature. J Karen . 2009:18:13-27.  8. Ceci DOMINGO, Jonas D, Helen S, Lissa P, Giovanny T, Fauzia M, Braulio B, Katina H, Shaquille R, Nia K, Maira L, Abdias DG, Jannie D, Juan Miguel DF, Stef MR, The Breast Cancer Susceptibility Collaboration (UK) & Joao FRANCISCO. KIYA mutations that cause ataxia-telangiectasia are breast cancer susceptibility alleles. Nature Genetics. 2006;38:873-875  9. Toni N , Mary Y, Zuly J, Ramila L, Anh GM , Bello ML, Gallinger S, Penny AG, Syngal S, Lonnie ML, Gayle J , Pedro R, Tomeka SZ, Apolinar JR, Derek VE, Michi M, Vomilan B, Gilson N, Esther RH, Luz KW, and Lamine AP. KIYA mutations in patients with hereditary pancreatic cancer. Cancer Discover. 2012;2:41-46  10. Ebony CHOUDHURY, et al. Breast-Cancer Risk in Families with Mutations in PALB2. NEJM. 2014; 371(6):497-506.  11. CHEK2 Breast Cancer Case-Control Consortium. CHEK2*1100delC and susceptibility to breast cancer: A collaborative analysis involving 10,860 breast cancer cases and 9,065 controls from 10 studies. Am J Hum Karen, 74 (2004), pp. 3654-5042  12. Phil T, Tianna S, Yuval K, et al. Spectrum of Mutations in BRCA1, BRCA2, CHEK2, and TP53 in Families at High Risk of Breast Cancer. ZEYAD. 2006;295(12):3816-1972.   13. Migue C, Ramez D, Emani A, et al. Risk of breast cancer in women with a CHEK2 mutation with and without a family history of breast cancer. J Clin Oncol. 2011;29:0489-0911.  14. Germán H, Adam E, Nelsy SJ, et al. Contribution of germline mutations in the RAD51B, RAD51C, and RAD51D genes to ovarian cancer in the population. J Clin Oncol. 2015;33(26):3232-1866. Doi:10.1200/JCO.2015.61.2408.  15. Wilton T, Gregg IVEY, Beto P, et al. Mutations in BRIP1 confer high risk of ovarian cancer. Rhianna Karen. 2011;43(11):9341-5596. doi:10.1038/ng.955.

## 2022-04-20 ENCOUNTER — TELEPHONE (OUTPATIENT)
Dept: ONCOLOGY | Facility: CLINIC | Age: 67
End: 2022-04-20
Payer: MEDICARE

## 2022-04-20 DIAGNOSIS — Z17.0 MALIGNANT NEOPLASM OF LOWER-OUTER QUADRANT OF RIGHT BREAST OF FEMALE, ESTROGEN RECEPTOR POSITIVE (H): Primary | ICD-10-CM

## 2022-04-20 DIAGNOSIS — C50.511 MALIGNANT NEOPLASM OF LOWER-OUTER QUADRANT OF RIGHT BREAST OF FEMALE, ESTROGEN RECEPTOR POSITIVE (H): Primary | ICD-10-CM

## 2022-04-20 DIAGNOSIS — Z80.3 FAMILY HISTORY OF MALIGNANT NEOPLASM OF BREAST: ICD-10-CM

## 2022-04-20 DIAGNOSIS — Z80.41 FAMILY HISTORY OF MALIGNANT NEOPLASM OF OVARY: ICD-10-CM

## 2022-04-20 NOTE — TELEPHONE ENCOUNTER
4/20/22    Anne Marie elected to proceed with genetic testing and we spoke over the phone today to finalize her testing plan and obtain consent.     Please see initial visit note dated 4/4/22 for complete details of our original genetic counseling visit.     We reviewed genetic testing options again today and she elected to proceed with testing via the BRCANext-Expanded Panel.    Genetic testing is available for 23 genes associated with hereditary gynecologic, breast, and related cancers: BRCANext-Expanded (KIYA, BARD1, BRCA1, BRCA2, BRIP1, CDH1, CHEK2, DICER1, EPCAM, MLH1, MSH2, MSH6, NBN, NF1, PALB2, PMS2, PTEN, RAD51C, RAD51D, RECQL, SMARCA4, STK11, TP53).    We discussed that some of the genes in the BRCANext-Expanded panel are associated with specific hereditary cancer syndromes and published management guidelines: Hereditary Breast and Ovarian Cancer syndrome (BRCA1, BRCA2), Man syndrome (MLH1, MSH2, MSH6, PMS2, EPCAM), Hereditary Diffuse Gastric Cancer (CDH1), Cowden syndrome (PTEN), Li Fraumeni syndrome (TP53), Peutz-Jeghers syndrome (STK11), and Neurofibromatosis type 1 (NF1).      Risk-reducing salpingo-oophorectomy can be considered in women with mutations in BRIP1, RAD51C, or RAD51D. Breast and/or other cancer risk management guidelines are available for KIYA, CHEK2, PALB2, NF1, and NBN.    The remaining genes (BARD1, DICER1, RECQL, and SMARCA4) are associated with increased cancer risk and may allow us to make medical recommendations when mutations are identified.      Due to COVID-19 precautions consent was obtained over the phone today. Genetic testing via the BRCANext-Expanded Panel will be sent to [facility]. Anne Marie opted to have a saliva sample collection kit shipped directly to her home from [facility]. She will ship the kit back to [facility] for analysis. Turnaround time from date when sample is received at the lab: approximately 3-4 weeks.    Plan:  1) Today Anne Marie elected to proceed  with genetic testing via the BRCANext-Expanded Panel offered by [facility].  2) This information should be available in 4-5 weeks.  3) Anne Marie will be scheduled for a virtual visit (phone or video) to discuss the results.     Veronika Monk MS, [facility]  Licensed, Certified Genetic Counselor  Office: [phone]  Email: [email or web address]

## 2022-04-26 RX ORDER — METHYLPHENIDATE HYDROCHLORIDE 10 MG/1
10 TABLET ORAL 3 TIMES DAILY
Qty: 90 TABLET | Refills: 0 | Status: SHIPPED | OUTPATIENT
Start: 2022-04-26 | End: 2022-06-13

## 2022-04-26 NOTE — TELEPHONE ENCOUNTER
Ritalin       Last Written Prescription Date:  3-21-22  Last Fill Quantity: 90,   # refills: 0  Last Office Visit: 4-4-22  Future Office visit:

## 2022-04-27 ENCOUNTER — TELEPHONE (OUTPATIENT)
Dept: FAMILY MEDICINE | Facility: OTHER | Age: 67
End: 2022-04-27
Payer: MEDICARE

## 2022-04-27 NOTE — TELEPHONE ENCOUNTER
2:02 PM    Reason for Call: OVERBOOK    Patient is needing a preop DOS 5-12-22 w/ Dr. Cobb @ [facility] bilateral mystectomy. Please call pt.    The patient is requesting an appointment for before 5-6-22 with NP Kim Wheat..    Was an appointment offered for this call? Yes  If yes : Appointment type              Date 5-9-22 but nurse wants earlier than that    Preferred method for responding to this message: Telephone Call  What is your phone number ? [phone]    If we cannot reach you directly, may we leave a detailed response at the number you provided? Yes    Can this message wait until your PCP/provider returns, if unavailable today? Akilah Vera

## 2022-05-02 NOTE — PROGRESS NOTES
[facility] - [place]  [address] [address]  Phone: [phone]  Primary Provider: Kim Rodriguez  Pre-op Performing Provider: KIM RODRIGUEZ      PREOPERATIVE EVALUATION:  Today's date: 5/3/2022    Anne Marie Nix is a 66 year old female who presents for a preoperative evaluation.    Surgical Information:  Surgery/Procedure: Bilateral Masectomy  Surgery Location: [place]  Surgeon: Dr. Trejo and Dr. Cobb  Surgery Date: 5/12/22  Time of Surgery: TBD  Where patient plans to recover: At home with family  Fax number for surgical facility: Note does not need to be faxed, will be available electronically in Epic.    Type of Anesthesia Anticipated: to be determined    Assessment & Plan     The proposed surgical procedure is considered INTERMEDIATE risk.    Preop general physical exam  Malignant neoplasm of right female breast, unspecified estrogen receptor status, unspecified site of breast (H)  Cleared for procedure   - Basic metabolic panel; Future  - CBC with platelets and differential; Future  - EKG 12-lead complete w/read - (Clinic Performed)    Attention deficit hyperactivity disorder (ADHD), predominantly inattentive type  Will hold Ritalin the day of the procedure     COVID test scheduled with [facility]        Risks and Recommendations:  The patient has the following additional risks and recommendations for perioperative complications:   - No identified additional risk factors other than previously addressed    Medication Instructions:  Patient is to take all scheduled medications on the day of surgery EXCEPT for modifications listed below:  hold vitamins and supplements for a week prior to procedure    - Psychostimulants: Hold the day of surgery    RECOMMENDATION:  APPROVAL GIVEN to proceed with proposed procedure, without further diagnostic evaluation.        Subjective     HPI related to upcoming procedure: history of breast cancer on the right breast        Preop Questions 5/3/2022   1. Have you ever had a heart attack or stroke? No   2. Have you ever had surgery on your heart or blood vessels, such as a stent placement, a coronary artery bypass, or surgery on an artery in your head, neck, heart, or legs? No   3. Do you have chest pain with activity? No   4. Do you have a history of  heart failure? No   5. Do you currently have a cold, bronchitis or symptoms of other infection? No   6. Do you have a cough, shortness of breath, or wheezing? No   7. Do you or anyone in your family have previous history of blood clots? No   8. Do you or does anyone in your family have a serious bleeding problem such as prolonged bleeding following surgeries or cuts? No   9. Have you ever had problems with anemia or been told to take iron pills? YES - many years ago   10. Have you had any abnormal blood loss such as black, tarry or bloody stools, or abnormal vaginal bleeding? No   11. Have you ever had a blood transfusion? No   12. Are you willing to have a blood transfusion if it is medically needed before, during, or after your surgery? Yes   13. Have you or any of your relatives ever had problems with anesthesia? YES - does not need as much - has been nausea    14. Do you have sleep apnea, excessive snoring or daytime drowsiness? No   15. Do you have any artifical heart valves or other implanted medical devices like a pacemaker, defibrillator, or continuous glucose monitor? No   16. Do you have artificial joints? No   17. Are you allergic to latex? No     Health Care Directive:  Patient does not have a Health Care Directive or Living Will: Patient states has Advance Directive and will bring in a copy to clinic.    Preoperative Review of :   reviewed - controlled substances reflected in medication list.      Status of Chronic Conditions:  See problem list for active medical problems.  Problems all longstanding and stable, except as noted/documented.  See ROS for pertinent  symptoms related to these conditions.      Review of Systems  Constitutional, neuro, ENT, endocrine, pulmonary, cardiac, gastrointestinal, genitourinary, musculoskeletal, integument and psychiatric systems are negative, except as otherwise noted.    Patient Active Problem List    Diagnosis Date Noted     Chemotherapy-induced neutropenia (H) 10/25/2021     Priority: Medium     Malignant neoplasm of lower-outer quadrant of right breast of female, estrogen receptor positive (H) 09/09/2021     Priority: Medium     Muscle weakness 08/06/2021     Priority: Medium     Stiffness of joint, shoulder region, right 08/06/2021     Priority: Medium     Attention deficit hyperactivity disorder (ADHD), predominantly inattentive type 09/12/2017     Priority: Medium     ACP (advance care planning) 07/08/2016     Priority: Medium     Advance Care Planning 7/8/2016: ACP Review of Chart / Resources Provided:  Reviewed chart for advance care plan.  Anne Marie PACHECO Nix has been provided information and resources to begin or update their advance care plan.  Added by Candis Ariza             Multiple respiratory allergies 07/18/2013     Priority: Medium      Past Medical History:   Diagnosis Date     Acne vulgaris      Cancer (H)      Chemotherapy-induced neutropenia (H) 10/25/2021     Complication of anesthesia     nauseated     Premenstrual tension syndromes 10/14/2004     Past Surgical History:   Procedure Laterality Date     INSERT PORT VASCULAR ACCESS N/A 10/19/2021    Procedure: port-a-cath placement;  Surgeon: Reginaldo Chen MD;  Location: [facility] OR     LUMPECTOMY BREAST Right 08/23/2021     [facility] OBSTETRICAL D&C       Current Outpatient Medications   Medication Sig Dispense Refill     lactobacillus rhamnosus (GG) (CULTURELL) capsule Take 1 capsule by mouth 2 times daily       methylphenidate (RITALIN) 10 MG tablet Take 1 tablet (10 mg) by mouth 3 times daily 90 tablet 0     multivitamin w/minerals (THERA-VIT-M) tablet Take 1 tablet  by mouth daily       Omega-3 Fatty Acids (FISH OIL) 1200 MG capsule Take 1,200 mg by mouth daily       Ascorbic Acid (VITAMIN C) POWD 3,000 units po daily (Patient not taking: No sig reported)       clindamycin-benzoyl peroxide (BENZACLIN) 1-5 % external gel  (Patient not taking: Reported on 11/10/2021)       dapsone 5 % topical gel Apply 1 g topically  (Patient not taking: Reported on 11/10/2021)       Lysine 1000 MG TABS Take by mouth daily  (Patient not taking: Reported on 2/2/2022)       melatonin 3 MG tablet Take 1 mg by mouth nightly as needed for sleep (Patient not taking: No sig reported)       NONFORMULARY Pro Omega 2000 ( 1125 EPA/ 875 DHA taking 2 orally daily  (Patient not taking: Reported on 3/18/2022)       NONFORMULARY Prothriver's wellness Multivitamin taking 2 orally daily        NONFORMULARY Four Sigmatic Plant Based Protein Super foods 1 scoop daily (Patient not taking: No sig reported)       NONFORMULARY Bone broth protein 1 scoop daily (Patient not taking: Reported on 2/17/2022)       NONFORMULARY MultiVitamin Powder Supplement (Patient not taking: Reported on 3/15/2022) 1 each 0     NONFORMULARY ImmunoCharge Supplement (Patient not taking: Reported on 1/12/2022) 1 each 0     NONFORMULARY Take 1 tablet by mouth daily Vitamin D-Vitamin K (VITAMIN K2-VITAMIN D3 OR) (Patient not taking: Reported on 3/15/2022)       ondansetron (ZOFRAN) 8 MG tablet Take 1 tablet (8 mg) by mouth every 8 hours as needed for nausea (Patient not taking: Reported on 11/24/2021) 60 tablet 3     prochlorperazine (COMPAZINE) 10 MG tablet TAKE 1 TABLET BY MOUTH EVERY 6 HOURS AS NEEDED FOR NAUSEA (Patient not taking: Reported on 10/27/2021)       pseudoePHEDrine (SUDAFED) 30 MG tablet Take 2 tablets by mouth every 4 hours as needed  (Patient not taking: Reported on 1/12/2022)       Turmeric Curcumin 500 MG CAPS Take 1,000 mg by mouth        valACYclovir (VALTREX) 1000 mg tablet Take 2 tablets (2,000 mg) by mouth 2 times daily  Once for break out (Patient not taking: Reported on 11/10/2021) 12 tablet 3     vitamin B complex with vitamin C (VITAMIN  B COMPLEX) tablet Take 1 tablet by mouth  (Patient not taking: Reported on 1/19/2022)         Allergies   Allergen Reactions     Vancomycin Other (See Comments)     Legs went numb- side effect     Amoxicillin Unknown     Headache  Stomach aches  Augmentin     Amoxicillin Trihydrate Other (See Comments)     Headache  Stomach aches  Augmentin         Clavulanic Acid Potassium Other (See Comments)     Headaches  Stomach aches  Augmentin       Levaquin [Levofloxacin] Other (See Comments)     Numbness in leg        Social History     Tobacco Use     Smoking status: Never Smoker     Smokeless tobacco: Never Used   Substance Use Topics     Alcohol use: No     Family History   Problem Relation Age of Onset     Allergies Mother      Breast Cancer Mother      Diabetes Mother      Cardiovascular Paternal Grandfather      Allergies Paternal Grandfather      Angina Paternal Grandfather      Coronary Artery Disease Paternal Grandfather      Other - See Comments Father      Depression Maternal Grandmother      Cerebrovascular Disease Maternal Grandmother      Thyroid Disease Maternal Grandmother      Colon Cancer Maternal Grandfather      Diabetes Maternal Grandfather      Rheumatoid Arthritis Paternal Grandmother      Breast Cancer Cousin      Breast Cancer Maternal Aunt      Cancer Maternal Aunt         esphogeal, lung     Hypertension No family hx of      Hyperlipidemia No family hx of      Prostate Cancer No family hx of      Anesthesia Reaction No family hx of      Asthma No family hx of      Genetic Disorder No family hx of      History   Drug Use No         Objective     /68   Pulse 104   Temp 97.7  F (36.5  C) (Tympanic)   Wt 55.8 kg (123 lb)   BMI 21.79 kg/m      Physical Exam    GENERAL APPEARANCE: alert, active and no distress     EYES: EOMI, PERRL     HENT: ear canals and TM's normal and  nose and mouth without ulcers or lesions     NECK: no adenopathy, no asymmetry, masses, or scars and thyroid normal to palpation     RESP: lungs clear to auscultation - no rales, rhonchi or wheezes     CV: regular rates and rhythm, normal S1 S2, no S3 or S4 and no murmur, click or rub     ABDOMEN:  soft, nontender, no HSM or masses and bowel sounds normal     MS: extremities normal- no gross deformities noted, no evidence of inflammation in joints, FROM in all extremities.     SKIN: no suspicious lesions or rashes     NEURO: Normal strength and tone, sensory exam grossly normal, mentation intact and speech normal     PSYCH: mentation appears normal. and affect normal/bright     LYMPHATICS: No cervical adenopathy    Recent Labs   Lab Test 03/29/22  1203 03/15/22  1510   HGB 12.0 11.7    256    137   POTASSIUM 4.4 4.1   CR 0.56 0.56        Diagnostics:  Recent Results (from the past 24 hour(s))   Basic metabolic panel    Collection Time: 05/03/22  1:39 PM   Result Value Ref Range    Sodium 138 133 - 144 mmol/L    Potassium 4.3 3.4 - 5.3 mmol/L    Chloride 106 94 - 109 mmol/L    Carbon Dioxide (CO2) 29 20 - 32 mmol/L    Anion Gap 3 3 - 14 mmol/L    Urea Nitrogen 17 7 - 30 mg/dL    Creatinine 0.61 0.52 - 1.04 mg/dL    Calcium 9.6 8.5 - 10.1 mg/dL    Glucose 90 70 - 99 mg/dL    GFR Estimate >90 >60 mL/min/1.73m2   CBC with platelets and differential    Collection Time: 05/03/22  1:39 PM   Result Value Ref Range    WBC Count 4.1 4.0 - 11.0 10e3/uL    RBC Count 4.28 3.80 - 5.20 10e6/uL    Hemoglobin 13.4 11.7 - 15.7 g/dL    Hematocrit 40.6 35.0 - 47.0 %    MCV 95 78 - 100 fL    MCH 31.3 26.5 - 33.0 pg    MCHC 33.0 31.5 - 36.5 g/dL    RDW 13.7 10.0 - 15.0 %    Platelet Count 265 150 - 450 10e3/uL    % Neutrophils 49 %    % Lymphocytes 24 %    % Monocytes 11 %    % Eosinophils 13 %    % Basophils 3 %    % Immature Granulocytes 0 %    NRBCs per 100 WBC 0 <1 /100    Absolute Neutrophils 2.0 1.6 - 8.3 10e3/uL     Absolute Lymphocytes 1.0 0.8 - 5.3 10e3/uL    Absolute Monocytes 0.5 0.0 - 1.3 10e3/uL    Absolute Eosinophils 0.6 0.0 - 0.7 10e3/uL    Absolute Basophils 0.1 0.0 - 0.2 10e3/uL    Absolute Immature Granulocytes 0.0 <=0.4 10e3/uL    Absolute NRBCs 0.0 10e3/uL      EKG: appears normal, NSR, possible atrial enlargement    Revised Cardiac Risk Index (RCRI):  The patient has the following serious cardiovascular risks for perioperative complications:   - No serious cardiac risks = 0 points     RCRI Interpretation: 1 point: Class II (low risk - 0.9% complication rate)           Signed Electronically by: DEEPA Jama CNP  Copy of this evaluation report is provided to requesting physician.

## 2022-05-03 ENCOUNTER — OFFICE VISIT (OUTPATIENT)
Dept: FAMILY MEDICINE | Facility: OTHER | Age: 67
End: 2022-05-03
Attending: NURSE PRACTITIONER
Payer: MEDICARE

## 2022-05-03 ENCOUNTER — LAB (OUTPATIENT)
Dept: LAB | Facility: OTHER | Age: 67
End: 2022-05-03
Payer: MEDICARE

## 2022-05-03 ENCOUNTER — APPOINTMENT (OUTPATIENT)
Dept: GENERAL RADIOLOGY | Facility: OTHER | Age: 67
End: 2022-05-03
Attending: NURSE PRACTITIONER
Payer: MEDICARE

## 2022-05-03 VITALS
HEART RATE: 104 BPM | TEMPERATURE: 97.7 F | DIASTOLIC BLOOD PRESSURE: 68 MMHG | BODY MASS INDEX: 21.79 KG/M2 | WEIGHT: 123 LBS | SYSTOLIC BLOOD PRESSURE: 128 MMHG

## 2022-05-03 DIAGNOSIS — Z01.818 PREOP GENERAL PHYSICAL EXAM: ICD-10-CM

## 2022-05-03 DIAGNOSIS — F90.0 ATTENTION DEFICIT HYPERACTIVITY DISORDER (ADHD), PREDOMINANTLY INATTENTIVE TYPE: ICD-10-CM

## 2022-05-03 DIAGNOSIS — C50.911 MALIGNANT NEOPLASM OF RIGHT FEMALE BREAST, UNSPECIFIED ESTROGEN RECEPTOR STATUS, UNSPECIFIED SITE OF BREAST (H): ICD-10-CM

## 2022-05-03 DIAGNOSIS — Z01.818 PREOP GENERAL PHYSICAL EXAM: Primary | ICD-10-CM

## 2022-05-03 LAB
ANION GAP SERPL CALCULATED.3IONS-SCNC: 3 MMOL/L (ref 3–14)
BASOPHILS # BLD AUTO: 0.1 10E3/UL (ref 0–0.2)
BASOPHILS NFR BLD AUTO: 3 %
BUN SERPL-MCNC: 17 MG/DL (ref 7–30)
CALCIUM SERPL-MCNC: 9.6 MG/DL (ref 8.5–10.1)
CHLORIDE BLD-SCNC: 106 MMOL/L (ref 94–109)
CO2 SERPL-SCNC: 29 MMOL/L (ref 20–32)
CREAT SERPL-MCNC: 0.61 MG/DL (ref 0.52–1.04)
EOSINOPHIL # BLD AUTO: 0.6 10E3/UL (ref 0–0.7)
EOSINOPHIL NFR BLD AUTO: 13 %
ERYTHROCYTE [DISTWIDTH] IN BLOOD BY AUTOMATED COUNT: 13.7 % (ref 10–15)
GFR SERPL CREATININE-BSD FRML MDRD: >90 ML/MIN/1.73M2
GLUCOSE BLD-MCNC: 90 MG/DL (ref 70–99)
HCT VFR BLD AUTO: 40.6 % (ref 35–47)
HGB BLD-MCNC: 13.4 G/DL (ref 11.7–15.7)
IMM GRANULOCYTES # BLD: 0 10E3/UL
IMM GRANULOCYTES NFR BLD: 0 %
LYMPHOCYTES # BLD AUTO: 1 10E3/UL (ref 0.8–5.3)
LYMPHOCYTES NFR BLD AUTO: 24 %
MCH RBC QN AUTO: 31.3 PG (ref 26.5–33)
MCHC RBC AUTO-ENTMCNC: 33 G/DL (ref 31.5–36.5)
MCV RBC AUTO: 95 FL (ref 78–100)
MONOCYTES # BLD AUTO: 0.5 10E3/UL (ref 0–1.3)
MONOCYTES NFR BLD AUTO: 11 %
NEUTROPHILS # BLD AUTO: 2 10E3/UL (ref 1.6–8.3)
NEUTROPHILS NFR BLD AUTO: 49 %
NRBC # BLD AUTO: 0 10E3/UL
NRBC BLD AUTO-RTO: 0 /100
PLATELET # BLD AUTO: 265 10E3/UL (ref 150–450)
POTASSIUM BLD-SCNC: 4.3 MMOL/L (ref 3.4–5.3)
RBC # BLD AUTO: 4.28 10E6/UL (ref 3.8–5.2)
SODIUM SERPL-SCNC: 138 MMOL/L (ref 133–144)
WBC # BLD AUTO: 4.1 10E3/UL (ref 4–11)

## 2022-05-03 PROCEDURE — 99213 OFFICE O/P EST LOW 20 MIN: CPT | Performed by: NURSE PRACTITIONER

## 2022-05-03 PROCEDURE — G0463 HOSPITAL OUTPT CLINIC VISIT: HCPCS | Mod: 25 | Performed by: NURSE PRACTITIONER

## 2022-05-03 PROCEDURE — 93005 ELECTROCARDIOGRAM TRACING: CPT | Performed by: NURSE PRACTITIONER

## 2022-05-03 PROCEDURE — 93010 ELECTROCARDIOGRAM REPORT: CPT | Mod: 77 | Performed by: INTERNAL MEDICINE

## 2022-05-03 RX ORDER — MULTIPLE VITAMINS W/ MINERALS TAB 9MG-400MCG
1 TAB ORAL DAILY
COMMUNITY
End: 2022-11-23

## 2022-05-03 RX ORDER — LACTOBACILLUS RHAMNOSUS GG 10B CELL
1 CAPSULE ORAL 2 TIMES DAILY
COMMUNITY
End: 2022-11-23

## 2022-05-03 RX ORDER — AMOXICILLIN 500 MG
1200 CAPSULE ORAL DAILY
COMMUNITY
End: 2022-11-23

## 2022-05-03 ASSESSMENT — PAIN SCALES - GENERAL: PAINLEVEL: NO PAIN (0)

## 2022-05-03 NOTE — PATIENT INSTRUCTIONS
Preparing for Your Surgery  Getting started  A nurse will call you to review your health history and instructions. They will give you an arrival time based on your scheduled surgery time. Please be ready to share:    Your doctor's clinic name and phone number    Your medical, surgical and anesthesia history    A list of allergies and sensitivities    A list of medicines, including herbal treatments and over-the-counter drugs    Whether the patient has a legal guardian (ask how to send us the papers in advance)  Please tell us if you're pregnant--or if there's any chance you might be pregnant. Some surgeries may injure a fetus (unborn baby), so they require a pregnancy test. Surgeries that are safe for a fetus don't always need a test, and you can choose whether to have one.   If you have a child who's having surgery, please ask for a copy of Preparing for Your Child's Surgery.    Preparing for surgery    Within 30 days of surgery: Have a pre-op exam (sometimes called an H&P, or History and Physical). This can be done at a clinic or pre-operative center.  ? If you're having a , you may not need this exam. Talk to your care team.    At your pre-op exam, talk to your care team about all medicines you take. If you need to stop any medicines before surgery, ask when to start taking them again.  ? We do this for your safety. Many medicines can make you bleed too much during surgery. Some change how well surgery (anesthesia) drugs work.    Call your insurance company to let them know you're having surgery. (If you don't have insurance, call [phone].)    Call your clinic if there's any change in your health. This includes signs of a cold or flu (sore throat, runny nose, cough, rash, fever). It also includes a scrape or scratch near the surgery site.    If you have questions on the day of surgery, call your hospital or surgery center.  COVID testing  You may need to be tested for COVID-19 before having  surgery. If so, we will give you instructions.  Eating and drinking guidelines  For your safety: Unless your surgeon tells you otherwise, follow the guidelines below.    Eat and drink as usual until 8 hours before surgery. After that, no food or milk.    Drink clear liquids until 2 hours before surgery. These are liquids you can see through, like water, Gatorade and Propel Water. You may also have black coffee and tea (no cream or milk).    Nothing by mouth within 2 hours of surgery. This includes gum, candy and breath mints.    If you drink alcohol: Stop drinking it the night before surgery.    If your care team tells you to take medicine on the morning of surgery, it's okay to take it with a sip of water.  Preventing infection    Shower or bathe the night before and morning of your surgery. Follow the instructions your clinic gave you. (If no instructions, use regular soap.)    Don't shave or clip hair near your surgery site. We'll remove the hair if needed.    Don't smoke or vape the morning of surgery. You may chew nicotine gum up to 2 hours before surgery. A nicotine patch is okay.  ? Note: Some surgeries require you to completely quit smoking and nicotine. Check with your surgeon.    Your care team will make every effort to keep you safe from infection. We will:  ? Clean our hands often with soap and water (or an alcohol-based hand rub).  ? Clean the skin at your surgery site with a special soap that kills germs.  ? Give you a special gown to keep you warm. (Cold raises the risk of infection.)  ? Wear special hair covers, masks, gowns and gloves during surgery.  ? Give antibiotic medicine, if prescribed. Not all surgeries need antibiotics.  What to bring on the day of surgery    Photo ID and insurance card    Copy of your health care directive, if you have one    Glasses and hearing aides (bring cases)  ? You can't wear contacts during surgery    Inhaler and eye drops, if you use them (tell us about these when  you arrive)    CPAP machine or breathing device, if you use them    A few personal items, if spending the night    If you have . . .  ? A pacemaker, ICD (cardiac defibrillator) or other implant: Bring the ID card.  ? An implanted stimulator: Bring the remote control.  ? A legal guardian: Bring a copy of the certified (court-stamped) guardianship papers.  Please remove any jewelry, including body piercings. Leave jewelry and other valuables at home.  If you're going home the day of surgery    You must have a responsible adult drive you home. They should stay with you overnight as well.    If you don't have someone to stay with you, and you aren't safe to go home alone, we may keep you overnight. Insurance often won't pay for this.  After surgery  If it's hard to control your pain or you need more pain medicine, please call your surgeon's office.  Questions?   If you have any questions for your care team, list them here: _________________________________________________________________________________________________________________________________________________________________________ ____________________________________ ____________________________________ ____________________________________  For informational purposes only. Not to replace the advice of your health care provider. Copyright   2003, 2019 [facility]. All rights reserved. Clinically reviewed by Ela Lee MD. [email or web address] [id number] - REV 07/21.

## 2022-05-03 NOTE — NURSING NOTE
"No chief complaint on file.      Initial /68   Pulse 104   Temp 97.7  F (36.5  C) (Tympanic)   Wt 55.8 kg (123 lb)   BMI 21.79 kg/m   Estimated body mass index is 21.79 kg/m  as calculated from the following:    Height as of 3/29/22: 1.6 m (5' 3\").    Weight as of this encounter: 55.8 kg (123 lb).  Medication Reconciliation: complete  Demarcus Molina LPN    "

## 2022-05-05 ENCOUNTER — TELEPHONE (OUTPATIENT)
Dept: FAMILY MEDICINE | Facility: OTHER | Age: 67
End: 2022-05-05
Payer: MEDICARE

## 2022-05-05 NOTE — TELEPHONE ENCOUNTER
Per request from [facility], only needed to fax EKG, stated they have the H/P and labs from the pre op that was done on 5/3/22.   Fax number [phone]

## 2022-06-10 DIAGNOSIS — F90.0 ATTENTION DEFICIT HYPERACTIVITY DISORDER (ADHD), PREDOMINANTLY INATTENTIVE TYPE: ICD-10-CM

## 2022-06-13 RX ORDER — METHYLPHENIDATE HYDROCHLORIDE 10 MG/1
10 TABLET ORAL 3 TIMES DAILY
Qty: 90 TABLET | Refills: 0 | Status: SHIPPED | OUTPATIENT
Start: 2022-06-13 | End: 2022-07-15

## 2022-06-13 NOTE — TELEPHONE ENCOUNTER
Ritalin      Last Written Prescription Date:  4.26.22  Last Fill Quantity: #90,   # refills: 0  Last Office Visit: 5.3.22  Future Office visit:       Routing refill request to provider for review/approval because:  Drug not on the G, P or [facility] refill protocol or controlled substance

## 2022-07-15 DIAGNOSIS — F90.0 ATTENTION DEFICIT HYPERACTIVITY DISORDER (ADHD), PREDOMINANTLY INATTENTIVE TYPE: ICD-10-CM

## 2022-07-15 RX ORDER — METHYLPHENIDATE HYDROCHLORIDE 10 MG/1
10 TABLET ORAL 3 TIMES DAILY
Qty: 90 TABLET | Refills: 0 | Status: SHIPPED | OUTPATIENT
Start: 2022-07-15 | End: 2022-08-12

## 2022-08-12 ENCOUNTER — MYC MEDICAL ADVICE (OUTPATIENT)
Dept: FAMILY MEDICINE | Facility: OTHER | Age: 67
End: 2022-08-12

## 2022-08-12 DIAGNOSIS — F90.0 ATTENTION DEFICIT HYPERACTIVITY DISORDER (ADHD), PREDOMINANTLY INATTENTIVE TYPE: ICD-10-CM

## 2022-08-12 RX ORDER — METHYLPHENIDATE HYDROCHLORIDE 10 MG/1
10 TABLET ORAL 3 TIMES DAILY
Qty: 90 TABLET | Refills: 0 | Status: SHIPPED | OUTPATIENT
Start: 2022-08-12 | End: 2022-09-19

## 2022-09-04 ENCOUNTER — HEALTH MAINTENANCE LETTER (OUTPATIENT)
Age: 67
End: 2022-09-04

## 2022-09-12 ENCOUNTER — VIRTUAL VISIT (OUTPATIENT)
Dept: ONCOLOGY | Facility: CLINIC | Age: 67
End: 2022-09-12
Attending: GENETIC COUNSELOR, MS
Payer: MEDICARE

## 2022-09-12 DIAGNOSIS — Z17.0 MALIGNANT NEOPLASM OF LOWER-OUTER QUADRANT OF RIGHT BREAST OF FEMALE, ESTROGEN RECEPTOR POSITIVE (H): Primary | ICD-10-CM

## 2022-09-12 DIAGNOSIS — Z80.3 FAMILY HISTORY OF MALIGNANT NEOPLASM OF BREAST: ICD-10-CM

## 2022-09-12 DIAGNOSIS — Z80.41 FAMILY HISTORY OF MALIGNANT NEOPLASM OF OVARY: ICD-10-CM

## 2022-09-12 DIAGNOSIS — C50.511 MALIGNANT NEOPLASM OF LOWER-OUTER QUADRANT OF RIGHT BREAST OF FEMALE, ESTROGEN RECEPTOR POSITIVE (H): Primary | ICD-10-CM

## 2022-09-12 PROCEDURE — 999N000069 HC STATISTIC GENETIC COUNSELING, < 16 MIN: Mod: GT | Performed by: GENETIC COUNSELOR, MS

## 2022-09-12 NOTE — PROGRESS NOTES
"Anne Marie Nix is a 67 year old who is being evaluated via a billable video visit.      Pt is in [place]    How would you like to obtain your AVS? MyChart  If the video visit is dropped, the invitation should be resent by: Send to e-mail at: [email or web address]  Will anyone else be joining your video visit? No    Video-Visit Details    Video Start Time: 1:20 pm    Type of service:  Video Visit    Video End Time: 1:28 PM    Originating Location (pt. Location): Home    Distant Location (provider location):  [facility]     Platform used for Video Visit: [employer]     Mary Obando     9/12/2022    Referring Provider: Valentina Avila NP    Presenting Information:  I spoke to Anne Marie over video today to discuss her genetic testing results. Testing was performed on a saliva sample collected by Anne Marie at her home. The BRCANext-Expanded Panel was ordered from [employer]. This testing was done because of Anne Marie's personal and family history of cancer.    Genetic Testing Result: NEGATIVE  Anne Marie is negative for mutations in the 23 genes analyzed: KIYA, BARD1, BRCA1, BRCA2, BRIP1, CDH1, CHEK2, DICER1, MLH1, MSH2, MSH6, NBN, NF1, PALB2, PMS2, PTEN, RAD51C, RAD51D, RECQL, SMARCA4, STK11 and TP53 (sequencing and deletion/duplication); EPCAM (deletion/duplication only).      A copy of the test report can be found in the Laboratory tab, dated 5/13/22, and named \"LABORATORY MISCELLANEOUS ORDER\". The report is scanned in as a linked document.    Interpretation:  We discussed several different interpretations of this negative test result.    1. One explanation may be that there is a different gene or combination of genes and environment that are associated with the cancers in this family.  2. It is possible that her relatives have a mutation in one of these genes and she did not inherit it.  3. There is also a small possibility that there is a mutation in one of these genes, and the testing " laboratory could not find it with their current testing methods.       Screening:  Based on this negative test result, it is important for Anne Marie and her relatives to refer back to the family history for appropriate cancer screening.      She should continue with her breast care as recommended by her oncology team.    Anne Marie s close female relatives remain at increased risk for breast cancer given their family history. Breast cancer screening is generally recommended to begin approximately 10 years younger than the earliest age of breast cancer diagnosis in the family, or at age 40, whichever comes first. Breast screening options should be discussed with an individual's primary care provider and a genetic counselor, to determine at what age to begin screening, what screening is appropriate, and if additional screening (such as breast MRI) is necessary based on personal/family history factors.       Due to Anne Marie's family history of ovarian cancer in her paternal cousin and granulosa cell ovarian tumor in her daughter, Anne Marie and other close female relatives may remain at slightly increased risk for ovarian cancer. We discussed available ovarian cancer screening (pelvic exams, CA-125 blood tests, and transvaginal ultrasounds) as well as the significant limitations of this screening. As such, this screening is not typically recommended. That being said, women in this family should discuss this screening and the signs and symptoms of ovarian cancer with their primary OB/GYN provider, as they may have individualized recommendations.       Other population cancer screening options, such as those recommended by the American Cancer Society and the National Comprehensive Cancer Network (NCCN), are also appropriate for Anne Marie and her family. These screening recommendations may change if there are changes to Anne Marie's personal and/or family history of cancer. Final screening recommendations should be made by each individual's  primary care provider.     Inheritance:  We reviewed the autosomal dominant inheritance of mutations in these genes. We discussed that Anne Marie cannot/did not pass on an identifiable mutation in these genes to her children based on this test result. Mutations in these genes do not skip generations.      Additional Testing Considerations:  Although Anne Marie's genetic testing result was negative, other relatives may still carry a gene mutation associated with an increased risk for cancer. Genetic counseling is recommended for her paternal relatives (due to her cousin's history of ovarian cancer) and for her maternal relatives (in particular, her cousins who have had breast cancer) to discuss genetic testing options. If any of these relatives do pursue genetic testing, Anne Marie is encouraged to contact me so that we may review the impact of their test results on her.    Summary:  We do not have an explanation for Anne Marie's personal or family history of breast cancer. While no genetic changes were identified, Anne Marie may still be at risk for certain cancers due to family history, environmental factors, or other genetic causes not identified by this test. Because of that, it is important that she continue with cancer screening based on her personal and family history as discussed above.    Genetic testing is rapidly advancing, and new cancer susceptibility genes will most likely be identified in the future. Therefore, I encouraged Anne Marie to contact me annually or if there are changes in her personal or family history. This may change how we assess her cancer risk, screening, and the testing we would offer.    Plan:  1. A copy of the test results will be mailed to Anne Marie. A copy of her results was also released to her today via the online [employer] portal.  2. She plans to follow-up with her physicians.  3. She should contact me regularly, or sooner if her family history changes.    If Anne Marie has any further questions, I encouraged  her to contact me at [phone].    Veronika Monk MS, [facility]  Licensed, Certified Genetic Counselor  Office: [phone]  Email: [email or web address]

## 2022-09-12 NOTE — Clinical Note
Please send copy of letter to patient with test results. Please enclose test results:  Other Laboratory; [facility]; BRCANext-Expanded Panel, genetic testing (Laboratory Miscellaneous Order) [[id number]] (Order [id number])

## 2022-09-12 NOTE — LETTER
Cancer Risk Management  Program Locations    [facility]  [facility]  [facility]  [facility]  [facility]  Mailing Address  Cancer Risk Management Program  [facility]  [address]  [place] [address]    New patient appointments  [phone]  September 13, 2022    Anne Marie Nix  [address] [facility] [address]    Dear Anne Marie  It was a pleasure speaking with you over video for genetic counseling on 9/12/2022. Here is a copy of the progress note from our discussion. If you have any additional questions, please feel free to call.    Referring Provider: Valentina Avila NP  Presenting Information:  I spoke to Anne Marie over video today to discuss her genetic testing results. Testing was performed on a saliva sample collected by Anne Marie at her home. The BRCANext-Expanded Panel was ordered from [employer]. This testing was done because of Anne Marie's personal and family history of cancer.  Genetic Testing Result: NEGATIVE  Anne Marie is negative for mutations in the 23 genes analyzed: KIYA, BARD1, BRCA1, BRCA2, BRIP1, CDH1, CHEK2, DICER1, MLH1, MSH2, MSH6, NBN, NF1, PALB2, PMS2, PTEN, RAD51C, RAD51D, RECQL, SMARCA4, STK11 and TP53 (sequencing and deletion/duplication); EPCAM (deletion/duplication only).    Interpretation:  We discussed several different interpretations of this negative test result.    1. One explanation may be that there is a different gene or combination of genes and environment that are associated with the cancers in this family.  2. It is possible that her relatives have a mutation in one of these genes and she did not inherit it.  3. There is also a small possibility that there is a mutation in one of these genes, and the testing laboratory could not find it with their current testing methods.     Screening:  Based on this negative test result, it  is important for Anne Marie and her relatives to refer back to the family history for appropriate cancer screening.      She should continue with her breast care as recommended by her oncology team.    Anne Marie s close female relatives remain at increased risk for breast cancer given their family history. Breast cancer screening is generally recommended to begin approximately 10 years younger than the earliest age of breast cancer diagnosis in the family, or at age 40, whichever comes first. Breast screening options should be discussed with an individual's primary care provider and a genetic counselor, to determine at what age to begin screening, what screening is appropriate, and if additional screening (such as breast MRI) is necessary based on personal/family history factors.       Due to Anne Marie's family history of ovarian cancer in her paternal cousin and granulosa cell ovarian tumor in her daughter, Anne Marie and other close female relatives may remain at slightly increased risk for ovarian cancer. We discussed available ovarian cancer screening (pelvic exams, CA-125 blood tests, and transvaginal ultrasounds) as well as the significant limitations of this screening. As such, this screening is not typically recommended. That being said, women in this family should discuss this screening and the signs and symptoms of ovarian cancer with their primary OB/GYN provider, as they may have individualized recommendations.       Other population cancer screening options, such as those recommended by the American Cancer Society and the National Comprehensive Cancer Network (NCCN), are also appropriate for Anne Marie and her family. These screening recommendations may change if there are changes to Anne Marie's personal and/or family history of cancer. Final screening recommendations should be made by each individual's primary care provider.   Inheritance:  We reviewed the autosomal dominant inheritance of mutations in these genes. We discussed  that Anne Marie cannot/did not pass on an identifiable mutation in these genes to her children based on this test result. Mutations in these genes do not skip generations.    Additional Testing Considerations:  Although Anne Marie's genetic testing result was negative, other relatives may still carry a gene mutation associated with an increased risk for cancer. Genetic counseling is recommended for her paternal relatives (due to her cousin's history of ovarian cancer) and for her maternal relatives (in particular, her cousins who have had breast cancer) to discuss genetic testing options. If any of these relatives do pursue genetic testing, Anne Marie is encouraged to contact me so that we may review the impact of their test results on her.  Summary:  We do not have an explanation for Anne Marie's personal or family history of breast cancer. While no genetic changes were identified, Anne Marie may still be at risk for certain cancers due to family history, environmental factors, or other genetic causes not identified by this test. Because of that, it is important that she continue with cancer screening based on her personal and family history as discussed above.    Genetic testing is rapidly advancing, and new cancer susceptibility genes will most likely be identified in the future. Therefore, I encouraged Anne Marie to contact me annually or if there are changes in her personal or family history. This may change how we assess her cancer risk, screening, and the testing we would offer.  Plan:  1. A copy of the test results will be mailed to Anne Marie. A copy of her results was also released to her today via the online [employer] portal.  2. She plans to follow-up with her physicians.  3. She should contact me regularly, or sooner if her family history changes.    If Anne Marie has any further questions, I encouraged her to contact me at [phone].    Veronika Monk MS, [facility]  Licensed, Certified Genetic Counselor  Office: [phone]  Email:  [email or web address]

## 2022-09-16 ENCOUNTER — MYC MEDICAL ADVICE (OUTPATIENT)
Dept: FAMILY MEDICINE | Facility: OTHER | Age: 67
End: 2022-09-16

## 2022-09-16 DIAGNOSIS — F90.0 ATTENTION DEFICIT HYPERACTIVITY DISORDER (ADHD), PREDOMINANTLY INATTENTIVE TYPE: ICD-10-CM

## 2022-09-17 DIAGNOSIS — F90.0 ATTENTION DEFICIT HYPERACTIVITY DISORDER (ADHD), PREDOMINANTLY INATTENTIVE TYPE: ICD-10-CM

## 2022-09-19 RX ORDER — METHYLPHENIDATE HYDROCHLORIDE 10 MG/1
10 TABLET ORAL 3 TIMES DAILY
Qty: 90 TABLET | Refills: 0 | Status: SHIPPED | OUTPATIENT
Start: 2022-09-19 | End: 2022-10-18

## 2022-09-19 RX ORDER — METHYLPHENIDATE HYDROCHLORIDE 10 MG/1
TABLET ORAL
Qty: 90 TABLET | Refills: 0 | OUTPATIENT
Start: 2022-09-19

## 2022-10-18 ENCOUNTER — MYC MEDICAL ADVICE (OUTPATIENT)
Dept: FAMILY MEDICINE | Facility: OTHER | Age: 67
End: 2022-10-18

## 2022-10-19 NOTE — TELEPHONE ENCOUNTER
Completed via [email or web address]. No concerns noted at present with NEGATIVE screening total score <3.     PHQ-2 Score:     PHQ-2 ( 1999 [employer]) 10/18/2022 3/15/2022   Q1: Little interest or pleasure in doing things 0 0   Q2: Feeling down, depressed or hopeless 0 0   PHQ-2 Score 0 0   PHQ-2 Total Score (12-17 Years)- Positive if 3 or more points; Administer PHQ-A if positive - 0   Q1: Little interest or pleasure in doing things Not at all -   Q2: Feeling down, depressed or hopeless Not at all -   PHQ-2 Score 0 -     Nila Lakhani RN

## 2022-10-24 ENCOUNTER — OFFICE VISIT (OUTPATIENT)
Dept: CHIROPRACTIC MEDICINE | Facility: OTHER | Age: 67
End: 2022-10-24
Attending: CHIROPRACTOR
Payer: MEDICARE

## 2022-10-24 DIAGNOSIS — M99.02 SEGMENTAL AND SOMATIC DYSFUNCTION OF THORACIC REGION: ICD-10-CM

## 2022-10-24 DIAGNOSIS — M99.03 SEGMENTAL AND SOMATIC DYSFUNCTION OF LUMBAR REGION: Primary | ICD-10-CM

## 2022-10-24 DIAGNOSIS — M54.50 ACUTE RIGHT-SIDED LOW BACK PAIN WITHOUT SCIATICA: ICD-10-CM

## 2022-10-24 PROCEDURE — 98940 CHIROPRACT MANJ 1-2 REGIONS: CPT | Mod: AT | Performed by: CHIROPRACTOR

## 2022-10-24 NOTE — PROGRESS NOTES
Subjective Finding:    Chief compalint: Patient presents with:  Back Pain  , Pain Scale: 5/10, Intensity: sharp, Duration: 1 months, Radiating: no.    Date of injury:     Activities that the pain restricts:   Home/household/hobbies/social activities: yes.  Work duties: yes.  Sleep: yes.  Makes symptoms better: rest.  Makes symptoms worse: lumbar extension and lumbar flexion.  Have you seen anyone else for the symptoms? Yes: MD.  Work related: no.  Automobile related injury: no.    Objective and Assessment:    Posture Analysis:   High shoulder: .  Head tilt: .  High iliac crest: right.  Head carriage: neutral.  Thoracic Kyphosis: neutral.  Lumbar Lordosis: forward.    Lumbar Range of Motion: extension decreased and right lateral flexion decreased.  Cervical Range of Motion: .  Thoracic Range of Motion: .  Extremity Range of Motion: .    Palpation:   Quad lumb: right, referred pain: no    Segmental dysfunction pre-treatment and treatment area: T8, L5 and PSIS Right.    Assessment post-treatment:  Cervical: .  Thoracic: ROM increased.  Lumbar: ROM increased.    Comments: .      Complicating Factors: .    Procedure(s):  CMT:  26212 Chiropractic manipulative treatment 1-2 regions performed   Thoracic: Diversified, See above for level, Prone and Lumbar: Diversified, See above for level, Side posture    Modalities:  None performed this visit    Therapeutic procedures:  None    Plan:  Treatment plan: 2 times per week for 2 weeks.  Instructed patient: stretch as instructed at visit and walk 10 minutes.  Short term goals: increase ROM.  Long term goals: increase ADL.  Prognosis: excellent.

## 2022-11-22 PROBLEM — C50.919 INVASIVE LOBULAR CARCINOMA OF BREAST IN FEMALE (H): Status: ACTIVE | Noted: 2022-09-28

## 2022-11-22 NOTE — PROGRESS NOTES
Assessment & Plan     Asymptomatic menopause  Due for screening   - DX Hip/Pelvis/Spine; Future    Special screening for malignant neoplasms, colon  Due for screening  Denies any family history of colon cancer  - COLJEREMY([employer]); Future    Lipid screening  Due for screening -plans to have done with her next blood draw  - Lipid Profile (Chol, Trig, HDL, LDL calc); Future    Attention deficit hyperactivity disorder (ADHD), predominantly inattentive type  Stable - conitnue with 2-3 per day as needed  Can either wean off or just use as needed.  She would like to get off of the ritalin if she is able   She is working on diet changes, stopped all sugar and decreased gluten and feels this is helping          See Patient Instructions    Return in about 3 months (around 2/23/2023) for ADHD.    DEEPA [facility] - BASSEM Kenney is a 67 year old accompanied by her self, presenting for the following health issues:  No chief complaint on file.      HPI     DEXA - agreed  Colon screening agreed to cologuard   LIPID screening - will have done at next blood draw  Mammogram: breast cancer following with oncology.  Did have breast MRI on 3/21/22 with known findings     Medication Followup of Ritalin    Taking Medication as prescribed: yes    Side Effects:  None    Medication Helping Symptoms:  Yes    Continues to help with focus and completing tasks     She would like to try to get off medication in the future if able.  We discussed using as needed or slowly weaning off         Review of Systems   CONSTITUTIONAL: NEGATIVE for fever, chills, change in weight  INTEGUMENTARY/SKIN: NEGATIVE for worrisome rashes, moles or lesions  EYES: NEGATIVE for vision changes or irritation  ENT/MOUTH: NEGATIVE for ear, mouth and throat problems  RESP: NEGATIVE for significant cough or SOB  CV: NEGATIVE for chest pain, palpitations or peripheral edema  GI: NEGATIVE for nausea, abdominal  pain, heartburn, or change in bowel habits  : denies dysuria   MUSCULOSKELETAL: NEGATIVE for significant arthralgias or myalgia  NEURO: NEGATIVE for weakness, dizziness or paresthesias  PSYCHIATRIC: NEGATIVE for changes in mood or affect      Objective    Pulse 86   Temp 97  F (36.1  C) (Tympanic)   Wt 53.5 kg (118 lb)   SpO2 99%   BMI 20.90 kg/m    Body mass index is 20.9 kg/m .  Physical Exam   GENERAL: alert  RESP: lungs clear to auscultation - no rales, rhonchi or wheezes  CV: regular rate and rhythm, normal S1 S2, no S3 or S4, no murmur, click or rub, no peripheral edema and peripheral pulses strong  ABDOMEN: soft, nontender, no hepatosplenomegaly, no masses and bowel sounds normal  PSYCH: mentation appears normal, affect normal/bright      Reviewed labs

## 2022-11-23 ENCOUNTER — OFFICE VISIT (OUTPATIENT)
Dept: FAMILY MEDICINE | Facility: OTHER | Age: 67
End: 2022-11-23
Attending: NURSE PRACTITIONER
Payer: MEDICARE

## 2022-11-23 VITALS — OXYGEN SATURATION: 99 % | TEMPERATURE: 97 F | HEART RATE: 86 BPM | BODY MASS INDEX: 20.9 KG/M2 | WEIGHT: 118 LBS

## 2022-11-23 DIAGNOSIS — F90.0 ATTENTION DEFICIT HYPERACTIVITY DISORDER (ADHD), PREDOMINANTLY INATTENTIVE TYPE: ICD-10-CM

## 2022-11-23 DIAGNOSIS — Z78.0 ASYMPTOMATIC MENOPAUSE: Primary | ICD-10-CM

## 2022-11-23 DIAGNOSIS — Z12.11 SPECIAL SCREENING FOR MALIGNANT NEOPLASMS, COLON: ICD-10-CM

## 2022-11-23 DIAGNOSIS — Z13.220 LIPID SCREENING: ICD-10-CM

## 2022-11-23 PROCEDURE — G0463 HOSPITAL OUTPT CLINIC VISIT: HCPCS | Mod: 25

## 2022-11-23 PROCEDURE — G0463 HOSPITAL OUTPT CLINIC VISIT: HCPCS

## 2022-11-23 PROCEDURE — 99213 OFFICE O/P EST LOW 20 MIN: CPT | Performed by: NURSE PRACTITIONER

## 2022-11-23 RX ORDER — METHYLPHENIDATE HYDROCHLORIDE 10 MG/1
10 TABLET ORAL 3 TIMES DAILY
Qty: 90 TABLET | Refills: 0 | Status: SHIPPED | OUTPATIENT
Start: 2022-11-23 | End: 2022-12-23

## 2022-11-23 ASSESSMENT — PAIN SCALES - GENERAL: PAINLEVEL: NO PAIN (0)

## 2022-11-23 NOTE — TELEPHONE ENCOUNTER
Ritalin 10 mg      Last Written Prescription Date:  10/2022  Last Fill Quantity: 90,   # refills: 0  Last Office Visit: 11/23/22  Future Office visit:       Routing refill request to provider for review/approval because:  Drug not on the [facility], P or [facility] refill protocol or controlled substance

## 2022-12-22 DIAGNOSIS — F90.0 ATTENTION DEFICIT HYPERACTIVITY DISORDER (ADHD), PREDOMINANTLY INATTENTIVE TYPE: ICD-10-CM

## 2022-12-23 RX ORDER — METHYLPHENIDATE HYDROCHLORIDE 10 MG/1
10 TABLET ORAL 3 TIMES DAILY
Qty: 90 TABLET | Refills: 0 | Status: SHIPPED | OUTPATIENT
Start: 2022-12-23 | End: 2023-01-27

## 2023-01-15 ENCOUNTER — HEALTH MAINTENANCE LETTER (OUTPATIENT)
Age: 68
End: 2023-01-15

## 2023-01-25 DIAGNOSIS — F90.0 ATTENTION DEFICIT HYPERACTIVITY DISORDER (ADHD), PREDOMINANTLY INATTENTIVE TYPE: ICD-10-CM

## 2023-01-26 NOTE — TELEPHONE ENCOUNTER
methylphenidate (RITALIN) 10 MG tablet  Last Written Prescription Date:  12-23-22  Last Fill Quantity: 90,   # refills: 0  Last Office Visit: 11-23-22  Future Office visit:       Routing refill request to provider for review/approval because:  Drug not on the [facility], P or [facility] refill protocol or controlled substance

## 2023-01-27 RX ORDER — METHYLPHENIDATE HYDROCHLORIDE 10 MG/1
TABLET ORAL
Qty: 90 TABLET | Refills: 0 | Status: SHIPPED | OUTPATIENT
Start: 2023-01-27 | End: 2023-03-01

## 2023-02-15 NOTE — TELEPHONE ENCOUNTER
ritalin      Last Written Prescription Date: 10/11/17  Last Fill Quantity: 90,  # refills: 0   Last Office Visit with [facility], [facility] or [facility] prescribing provider: 10/11/17                                                cough, chest congestion, wheezing , dizziness, shortness of breath

## 2023-02-23 ENCOUNTER — TELEPHONE (OUTPATIENT)
Dept: FAMILY MEDICINE | Facility: OTHER | Age: 68
End: 2023-02-23

## 2023-02-23 DIAGNOSIS — F90.0 ATTENTION DEFICIT HYPERACTIVITY DISORDER (ADHD), PREDOMINANTLY INATTENTIVE TYPE: ICD-10-CM

## 2023-02-23 NOTE — TELEPHONE ENCOUNTER
Tried calling pt to cancel pt appointment on 2-27-23 with FABIÁN Wheat because pt has [employer] insurance

## 2023-02-27 NOTE — TELEPHONE ENCOUNTER
Ritalin       Last Written Prescription Date:  1/27/23  Last Fill Quantity: 90,   # refills: 0  Last Office Visit: 11/23/22  Future Office visit:

## 2023-03-01 RX ORDER — METHYLPHENIDATE HYDROCHLORIDE 10 MG/1
TABLET ORAL
Qty: 90 TABLET | Refills: 0 | Status: SHIPPED | OUTPATIENT
Start: 2023-03-01 | End: 2023-04-07

## 2023-03-01 NOTE — TELEPHONE ENCOUNTER
Routing refill request to provider for review/approval because:    Drug not on the [facility], [facility] or [facility] refill protocol or controlled substance

## 2023-03-17 ENCOUNTER — TELEPHONE (OUTPATIENT)
Dept: ONCOLOGY | Facility: OTHER | Age: 68
End: 2023-03-17

## 2023-03-17 NOTE — TELEPHONE ENCOUNTER
I had asked a while back for someone to reach out to her in regards to what she is doing now with follow-up as I am wondering if she is following with oncology at all.  I do not see any notes in her chart that reflect this however so someone could reach out to her just to be sure she is being cared for I would appreciate it.

## 2023-03-20 NOTE — TELEPHONE ENCOUNTER
TC to patient to see is she is following with oncology or has been continuing to follow with surgery. Unable to leave VM.

## 2023-04-03 DIAGNOSIS — F90.0 ATTENTION DEFICIT HYPERACTIVITY DISORDER (ADHD), PREDOMINANTLY INATTENTIVE TYPE: ICD-10-CM

## 2023-04-06 NOTE — TELEPHONE ENCOUNTER
Ritalin   Last Written Prescription Date:  3.1.2023  Last Fill Quantity: 90,   # refills: 0  Last Office Visit: 11.23.2022  Future Office visit:       Routing refill request to provider for review/approval because:  Drug not on the [facility], P or [facility] refill protocol or controlled substance    Christy Goss RN

## 2023-04-07 RX ORDER — METHYLPHENIDATE HYDROCHLORIDE 10 MG/1
TABLET ORAL
Qty: 90 TABLET | Refills: 0 | Status: SHIPPED | OUTPATIENT
Start: 2023-04-07 | End: 2023-05-05

## 2023-04-10 DIAGNOSIS — Z12.11 SPECIAL SCREENING FOR MALIGNANT NEOPLASMS, COLON: ICD-10-CM

## 2023-04-28 ENCOUNTER — TELEPHONE (OUTPATIENT)
Dept: ONCOLOGY | Facility: OTHER | Age: 68
End: 2023-04-28

## 2023-05-04 DIAGNOSIS — F90.0 ATTENTION DEFICIT HYPERACTIVITY DISORDER (ADHD), PREDOMINANTLY INATTENTIVE TYPE: ICD-10-CM

## 2023-05-04 DIAGNOSIS — Z12.11 SPECIAL SCREENING FOR MALIGNANT NEOPLASMS, COLON: ICD-10-CM

## 2023-05-05 RX ORDER — METHYLPHENIDATE HYDROCHLORIDE 10 MG/1
TABLET ORAL
Qty: 90 TABLET | Refills: 0 | Status: SHIPPED | OUTPATIENT
Start: 2023-05-05 | End: 2023-07-14

## 2023-05-05 NOTE — TELEPHONE ENCOUNTER
Ritalin      Last Written Prescription Date:  4.7.23  Last Fill Quantity: #90,   # refills: 0  Last Office Visit: 11.23.22  Future Office visit:       Routing refill request to provider for review/approval because:  Drug not on the G, P or [facility] refill protocol or controlled substance

## 2023-05-08 ENCOUNTER — TELEPHONE (OUTPATIENT)
Dept: FAMILY MEDICINE | Facility: OTHER | Age: 68
End: 2023-05-08

## 2023-05-08 DIAGNOSIS — Z12.11 SPECIAL SCREENING FOR MALIGNANT NEOPLASMS, COLON: ICD-10-CM

## 2023-05-08 DIAGNOSIS — Z12.11 SPECIAL SCREENING FOR MALIGNANT NEOPLASMS, COLON: Primary | ICD-10-CM

## 2023-07-12 DIAGNOSIS — F90.0 ATTENTION DEFICIT HYPERACTIVITY DISORDER (ADHD), PREDOMINANTLY INATTENTIVE TYPE: ICD-10-CM

## 2023-07-14 RX ORDER — METHYLPHENIDATE HYDROCHLORIDE 10 MG/1
TABLET ORAL
Qty: 90 TABLET | Refills: 0 | Status: SHIPPED | OUTPATIENT
Start: 2023-07-14 | End: 2023-08-15

## 2023-07-14 NOTE — TELEPHONE ENCOUNTER
Ritalin      Last Written Prescription Date:  6.12.23  Last Fill Quantity: #90,   # refills: 0  Last Office Visit: 11.23.22  Future Office visit:       Routing refill request to provider for review/approval because:  Drug not on the G, P or [facility] refill protocol or controlled substance

## 2023-07-23 ENCOUNTER — HEALTH MAINTENANCE LETTER (OUTPATIENT)
Age: 68
End: 2023-07-23

## 2023-08-15 DIAGNOSIS — F90.0 ATTENTION DEFICIT HYPERACTIVITY DISORDER (ADHD), PREDOMINANTLY INATTENTIVE TYPE: ICD-10-CM

## 2023-08-15 RX ORDER — METHYLPHENIDATE HYDROCHLORIDE 10 MG/1
10 TABLET ORAL 3 TIMES DAILY
Qty: 90 TABLET | Refills: 0 | Status: SHIPPED | OUTPATIENT
Start: 2023-08-15 | End: 2023-08-18

## 2023-08-15 NOTE — TELEPHONE ENCOUNTER
Ritalin      Last Written Prescription Date:  7.14.23  Last Fill Quantity: #90,   # refills: 0  Last Office Visit: 11.23.22  Future Office visit:       Routing refill request to provider for review/approval because:  Drug not on the G, P or [facility] refill protocol or controlled substance

## 2023-08-18 DIAGNOSIS — F90.0 ATTENTION DEFICIT HYPERACTIVITY DISORDER (ADHD), PREDOMINANTLY INATTENTIVE TYPE: ICD-10-CM

## 2023-08-18 NOTE — TELEPHONE ENCOUNTER
Pharmacy is requesting for script to be E-prescribed  Last script was faxed & is not being accepted  Pharmacy sent a fax requesting this- put in scan bin    Pended to PCP to review

## 2023-08-20 RX ORDER — METHYLPHENIDATE HYDROCHLORIDE 10 MG/1
10 TABLET ORAL 3 TIMES DAILY
Qty: 90 TABLET | Refills: 0 | Status: SHIPPED | OUTPATIENT
Start: 2023-08-20 | End: 2023-09-29

## 2023-09-05 NOTE — TELEPHONE ENCOUNTER
10:10 AM    Reason for Call: Phone Call    Description: generic Zpac did not help.  Still has symptoms.  Please call back and provide next steps.  Thank you.    Was an appointment offered for this call? No  If yes : Appointment type              Date    Preferred method for responding to this message: Telephone Call  What is your phone number ?  [phone]     If we cannot reach you directly, may we leave a detailed response at the number you provided? Yes    Can this message wait until your PCP/provider returns, if available today? Not applicable, provider is in     [place]ral[place]    
Detail Level: Detailed
Notified patient of new rx. Patient is curious why she was prescribed a generic zpack. Was a generic zpack prescribed or was it something the pharmacy gave due to her insurance?     Is going to go ahead and try the levaquin for now - just wanted to give Kim a heads up that for future would not like generic zpacks and okay with paying difference if it was an insurance issue.     Will call back if having any issues with the levaquin.     Andria Mckeon, CHIN      
Patient called in regards to earlier telephone call. Please advise.  
Please notify new prescription sent for Levaquin. Please discuss possible side effects of tendon problems. This is the next options for antibiotic due to her allergy to Augmentin.     Kim ARENAS FNP-BC  Family Nurse Practitioner    
Please see below. Please call patient to inform.  
Prescription was sent for the azithromycin and depending on her insurance they usually fill with generics if available.   
Spoke with sarina and relates only taking 1/2 of zpak prescription due to list of side effectsl  Wants future Rx's to elodia GREY.  Nataliia Wilson    
Detail Level: Zone
Detail Level: Simple

## 2023-09-27 DIAGNOSIS — F90.0 ATTENTION DEFICIT HYPERACTIVITY DISORDER (ADHD), PREDOMINANTLY INATTENTIVE TYPE: ICD-10-CM

## 2023-09-29 RX ORDER — METHYLPHENIDATE HYDROCHLORIDE 10 MG/1
10 TABLET ORAL 3 TIMES DAILY
Qty: 90 TABLET | Refills: 0 | Status: SHIPPED | OUTPATIENT
Start: 2023-09-29 | End: 2024-01-10

## 2023-09-29 NOTE — TELEPHONE ENCOUNTER
Ritalin      Last Written Prescription Date:  8.21.23  Last Fill Quantity: #90,   # refills: 0  Last Office Visit: 11.23.22  Future Office visit:       Routing refill request to provider for review/approval because:  Drug not on the G, P or [facility] refill protocol or controlled substance

## 2024-01-09 NOTE — PROGRESS NOTES
Assessment & Plan     Attention deficit hyperactivity disorder (ADHD), predominantly inattentive type  Stable with current treatment   Will continue with Ritalin as prescribed   - Comprehensive metabolic panel (BMP + Alb, Alk Phos, ALT, AST, Total. Bili, TP); Future  - Comprehensive metabolic panel (BMP + Alb, Alk Phos, ALT, AST, Total. Bili, TP)    Lipid screening  Slight elevation in cholesterol and LDL and high HDL - she continues to eat health, stays active and is maintaining a healthy weight - no statin needed at this time - repeat LIPIDs yearly  - Lipid Profile (Chol, Trig, HDL, LDL calc); Future  - Lipid Profile (Chol, Trig, HDL, LDL calc)    Asymptomatic menopause  Due for screening if she has osteoporosis she would like to discuss with endocrine to find out the best choice for her   - DX Hip/Pelvis/Spine; Future    Special screening for malignant neoplasms, colon  Due for screening   - COLOGUARD([employer]); Future    Anxiety  Normal thyroid level   Continue with current treatment for ADHD  - TSH with free T4 reflex; Future  - TSH with free T4 reflex    Snoring  Anne Marie states she snores, but sleeps all night  She does increase her caffeine intake during the day due to frequently feeling fatigued all day.  Discussed possible not getting quality sleep at night - referral for sleep study   - Adult ENT  Referral; Future  - Adult Sleep Eval & Management  Referral; Future    History of fracture of nose  Anne Marie feels like her nasal passage on the right side continues to get smaller and she is snoring.  She would like to know if she can have her nasal passage repaired   - Adult ENT  Referral; Future    Ordering of each unique test  I spent a total of 32 minutes on the day of the visit.   Time spent by me doing chart review, history and exam, documentation and further activities per the note       See Patient Instructions    No follow-ups on file.    Kim Wheat, APRN  [facility] Kenney is a 68 year old, presenting for the following health issues:  No chief complaint on file.      HPI     DEXA - plan to update - if needing treatment would like to discuss with endocrine   Colon screening - agreed to cologuard   Mammogram - will discuss with oncologist.  history of breast cancer     Medication Followup of [id number] mg  Taking Medication as prescribed: yes  Side Effects:  None  Medication Helping Symptoms:  yes      Review of Systems   CONSTITUTIONAL:NEGATIVE for fever, chills, change in weight  INTEGUMENTARY/SKIN: NEGATIVE for worrisome rashes, moles or lesions  EYES: NEGATIVE for vision changes or irritation  ENT/MOUTH: NEGATIVE for ear, mouth and throat problems  RESP:NEGATIVE for significant cough or SOB  CV: NEGATIVE for chest pain, palpitations or peripheral edema  GI: NEGATIVE for nausea, abdominal pain, heartburn, or change in bowel habits  : denies dysuria   MUSCULOSKELETAL: NEGATIVE for significant arthralgias or myalgia  NEURO: NEGATIVE for weakness, dizziness or paresthesias  ENDOCRINE: NEGATIVE for temperature intolerance, skin/hair changes  PSYCHIATRIC: ADHD      Objective    /80   Pulse 84   Temp 97  F (36.1  C) (Tympanic)   Wt 55.3 kg (122 lb)   SpO2 98%   BMI 21.61 kg/m    Body mass index is 21.61 kg/m .  Physical Exam   GENERAL: healthy, alert and no distress  NECK: no adenopathy, no asymmetry, masses, or scars and thyroid normal to palpation  RESP: lungs clear to auscultation - no rales, rhonchi or wheezes  CV: regular rate and rhythm, normal S1 S2, no S3 or S4, no murmur, click or rub, no peripheral edema and peripheral pulses strong  ABDOMEN: soft, nontender, no hepatosplenomegaly, no masses and bowel sounds normal  SKIN: no suspicious lesions or rashes  PSYCH: mentation appears normal and anxious  LYMPH: normal ant/post cervical, supraclavicular nodes    Results for orders placed or performed in visit on  01/10/24   Lipid Profile (Chol, Trig, HDL, LDL calc)     Status: Abnormal   Result Value Ref Range    Cholesterol 290 (H) <200 mg/dL    Triglycerides 74 <150 mg/dL    Direct Measure  >=50 mg/dL    LDL Cholesterol Calculated 161 (H) <=100 mg/dL    Non HDL Cholesterol 176 (H) <130 mg/dL    Patient Fasting > 8hrs? Yes     Narrative    Cholesterol  Desirable:  <200 mg/dL    Triglycerides  Normal:  Less than 150 mg/dL  Borderline High:  150-199 mg/dL  High:  200-499 mg/dL  Very High:  Greater than or equal to 500 mg/dL    Direct Measure HDL  Female:  Greater than or equal to 50 mg/dL   Male:  Greater than or equal to 40 mg/dL    LDL Cholesterol  Desirable:  <100mg/dL  Above Desirable:  100-129 mg/dL   Borderline High:  130-159 mg/dL   High:  160-189 mg/dL   Very High:  >= 190 mg/dL    Non HDL Cholesterol  Desirable:  130 mg/dL  Above Desirable:  130-159 mg/dL  Borderline High:  160-189 mg/dL  High:  190-219 mg/dL  Very High:  Greater than or equal to 220 mg/dL   Comprehensive metabolic panel (BMP + Alb, Alk Phos, ALT, AST, Total. Bili, TP)     Status: Abnormal   Result Value Ref Range    Sodium 138 135 - 145 mmol/L    Potassium 4.8 3.4 - 5.3 mmol/L    Carbon Dioxide (CO2) 22 22 - 29 mmol/L    Anion Gap 11 7 - 15 mmol/L    Urea Nitrogen 23.9 (H) 8.0 - 23.0 mg/dL    Creatinine 0.66 0.51 - 0.95 mg/dL    GFR Estimate >90 >60 mL/min/1.73m2    Calcium 9.4 8.8 - 10.2 mg/dL    Chloride 105 98 - 107 mmol/L    Glucose 98 70 - 99 mg/dL    Alkaline Phosphatase 64 40 - 150 U/L    AST 44 0 - 45 U/L    ALT 64 (H) 0 - 50 U/L    Protein Total 6.9 6.4 - 8.3 g/dL    Albumin 4.2 3.5 - 5.2 g/dL    Bilirubin Total 0.2 <=1.2 mg/dL   TSH with free T4 reflex     Status: Normal   Result Value Ref Range    TSH 3.34 0.30 - 4.20 uIU/mL

## 2024-01-10 ENCOUNTER — LAB (OUTPATIENT)
Dept: FAMILY MEDICINE | Facility: OTHER | Age: 69
End: 2024-01-10

## 2024-01-10 ENCOUNTER — OFFICE VISIT (OUTPATIENT)
Dept: FAMILY MEDICINE | Facility: OTHER | Age: 69
End: 2024-01-10
Attending: NURSE PRACTITIONER
Payer: COMMERCIAL

## 2024-01-10 VITALS
HEART RATE: 84 BPM | BODY MASS INDEX: 21.61 KG/M2 | OXYGEN SATURATION: 98 % | SYSTOLIC BLOOD PRESSURE: 138 MMHG | WEIGHT: 122 LBS | TEMPERATURE: 97 F | DIASTOLIC BLOOD PRESSURE: 80 MMHG

## 2024-01-10 DIAGNOSIS — F90.0 ATTENTION DEFICIT HYPERACTIVITY DISORDER (ADHD), PREDOMINANTLY INATTENTIVE TYPE: Primary | ICD-10-CM

## 2024-01-10 DIAGNOSIS — R06.83 SNORING: ICD-10-CM

## 2024-01-10 DIAGNOSIS — Z13.220 LIPID SCREENING: ICD-10-CM

## 2024-01-10 DIAGNOSIS — Z87.81 HISTORY OF FRACTURE OF NOSE: ICD-10-CM

## 2024-01-10 DIAGNOSIS — Z12.11 SPECIAL SCREENING FOR MALIGNANT NEOPLASMS, COLON: ICD-10-CM

## 2024-01-10 DIAGNOSIS — F90.0 ATTENTION DEFICIT HYPERACTIVITY DISORDER (ADHD), PREDOMINANTLY INATTENTIVE TYPE: ICD-10-CM

## 2024-01-10 DIAGNOSIS — F41.9 ANXIETY: ICD-10-CM

## 2024-01-10 DIAGNOSIS — Z78.0 ASYMPTOMATIC MENOPAUSE: ICD-10-CM

## 2024-01-10 LAB
ALBUMIN SERPL BCG-MCNC: 4.2 G/DL (ref 3.5–5.2)
ALP SERPL-CCNC: 64 U/L (ref 40–150)
ALT SERPL W P-5'-P-CCNC: 64 U/L (ref 0–50)
ANION GAP SERPL CALCULATED.3IONS-SCNC: 11 MMOL/L (ref 7–15)
AST SERPL W P-5'-P-CCNC: 44 U/L (ref 0–45)
BILIRUB SERPL-MCNC: 0.2 MG/DL
BUN SERPL-MCNC: 23.9 MG/DL (ref 8–23)
CALCIUM SERPL-MCNC: 9.4 MG/DL (ref 8.8–10.2)
CHLORIDE SERPL-SCNC: 105 MMOL/L (ref 98–107)
CHOLEST SERPL-MCNC: 290 MG/DL
CREAT SERPL-MCNC: 0.66 MG/DL (ref 0.51–0.95)
DEPRECATED HCO3 PLAS-SCNC: 22 MMOL/L (ref 22–29)
EGFRCR SERPLBLD CKD-EPI 2021: >90 ML/MIN/1.73M2
FASTING STATUS PATIENT QL REPORTED: YES
GLUCOSE SERPL-MCNC: 98 MG/DL (ref 70–99)
HDLC SERPL-MCNC: 114 MG/DL
LDLC SERPL CALC-MCNC: 161 MG/DL
NONHDLC SERPL-MCNC: 176 MG/DL
POTASSIUM SERPL-SCNC: 4.8 MMOL/L (ref 3.4–5.3)
PROT SERPL-MCNC: 6.9 G/DL (ref 6.4–8.3)
SODIUM SERPL-SCNC: 138 MMOL/L (ref 135–145)
TRIGL SERPL-MCNC: 74 MG/DL
TSH SERPL DL<=0.005 MIU/L-ACNC: 3.34 UIU/ML (ref 0.3–4.2)

## 2024-01-10 PROCEDURE — G0463 HOSPITAL OUTPT CLINIC VISIT: HCPCS

## 2024-01-10 PROCEDURE — 84443 ASSAY THYROID STIM HORMONE: CPT | Mod: ZL | Performed by: NURSE PRACTITIONER

## 2024-01-10 PROCEDURE — 99214 OFFICE O/P EST MOD 30 MIN: CPT | Performed by: NURSE PRACTITIONER

## 2024-01-10 PROCEDURE — 80053 COMPREHEN METABOLIC PANEL: CPT | Mod: ZL | Performed by: NURSE PRACTITIONER

## 2024-01-10 PROCEDURE — 36415 COLL VENOUS BLD VENIPUNCTURE: CPT | Mod: ZL | Performed by: NURSE PRACTITIONER

## 2024-01-10 PROCEDURE — 80061 LIPID PANEL: CPT | Mod: ZL | Performed by: NURSE PRACTITIONER

## 2024-01-10 RX ORDER — METHYLPHENIDATE HYDROCHLORIDE 10 MG/1
10 TABLET ORAL 3 TIMES DAILY
Qty: 90 TABLET | Refills: 0 | Status: SHIPPED | OUTPATIENT
Start: 2024-01-10 | End: 2024-02-15

## 2024-01-10 RX ORDER — VALACYCLOVIR HYDROCHLORIDE 500 MG/1
TABLET, FILM COATED ORAL
COMMUNITY
Start: 2023-12-06

## 2024-01-10 NOTE — TELEPHONE ENCOUNTER
Ritalin 10 mg       Last Written Prescription Date:  9/29/23  Last Fill Quantity: 90,   # refills: 0  Last Office Visit: 1/10/23  Future Office visit:       Routing refill request to provider for review/approval because:  Drug not on the [facility], P or [facility] refill protocol or controlled substance     Nasal bone fracture

## 2024-01-10 NOTE — PATIENT INSTRUCTIONS
Discuss mammogram with oncologist - may need US vs MRI due to expanders and history of breast cancer     Continue with Ritalin     Work on sleep hygiene - to helps with daytime fatigue    Referral for sleep study and ENT

## 2024-02-15 DIAGNOSIS — F90.0 ATTENTION DEFICIT HYPERACTIVITY DISORDER (ADHD), PREDOMINANTLY INATTENTIVE TYPE: ICD-10-CM

## 2024-02-15 RX ORDER — METHYLPHENIDATE HYDROCHLORIDE 10 MG/1
10 TABLET ORAL 3 TIMES DAILY
Qty: 90 TABLET | Refills: 0 | Status: SHIPPED | OUTPATIENT
Start: 2024-02-15 | End: 2024-04-03

## 2024-02-15 NOTE — TELEPHONE ENCOUNTER
Methylphenidate 10 mg       Last Written Prescription Date:  1/10/24  Last Fill Quantity: 90,   # refills: 0  Last Office Visit: 1/10/24  Future Office visit:    Next 5 appointments (look out 90 days)      Apr 10, 2024  9:00 AM  (Arrive by 8:45 AM)  SHORT with DEEPA Cobb CNP  [facility] [place] ([facility] [place] ) [address]  [place] [address]  [phone]             Routing refill request to provider for review/approval because:  Drug not on the [facility], P or [facility] refill protocol or controlled substance

## 2024-02-18 ENCOUNTER — HEALTH MAINTENANCE LETTER (OUTPATIENT)
Age: 69
End: 2024-02-18

## 2024-04-03 DIAGNOSIS — F90.0 ATTENTION DEFICIT HYPERACTIVITY DISORDER (ADHD), PREDOMINANTLY INATTENTIVE TYPE: ICD-10-CM

## 2024-04-03 RX ORDER — METHYLPHENIDATE HYDROCHLORIDE 10 MG/1
10 TABLET ORAL 3 TIMES DAILY
Qty: 90 TABLET | Refills: 0 | Status: SHIPPED | OUTPATIENT
Start: 2024-04-03 | End: 2024-05-08

## 2024-04-03 NOTE — TELEPHONE ENCOUNTER
Ritalin 10 MG      Last Written Prescription Date:  02/15/24  Last Fill Quantity: 90,   # refills: 0  Last Office Visit: 01/10/24  Future Office visit:    Next 5 appointments (look out 90 days)      Apr 10, 2024  9:00 AM  (Arrive by 8:45 AM)  SHORT with DEEPA Cobb CNP  [facility] ([facility] [place] ) [address]  [place] [address]  [phone]             Routing refill request to provider for review/approval because:  Drug not on the [facility], P or [facility] refill protocol or controlled substance

## 2024-05-08 DIAGNOSIS — F90.0 ATTENTION DEFICIT HYPERACTIVITY DISORDER (ADHD), PREDOMINANTLY INATTENTIVE TYPE: ICD-10-CM

## 2024-05-08 RX ORDER — METHYLPHENIDATE HYDROCHLORIDE 10 MG/1
10 TABLET ORAL 3 TIMES DAILY
Qty: 90 TABLET | Refills: 0 | Status: SHIPPED | OUTPATIENT
Start: 2024-05-08 | End: 2024-06-14

## 2024-05-08 NOTE — TELEPHONE ENCOUNTER
Ritalin 10 MG      Last Written Prescription Date:  04/03/24  Last Fill Quantity: 90,   # refills: 0  Last Office Visit: 01/10/24  Future Office visit:    Next 5 appointments (look out 90 days)      May 14, 2024  9:30 AM  (Arrive by 9:15 AM)  Provider Visit with DEEPA Cobb CNP  [facility] [place] ([facility] [place] ) [address]  [place] [address]  [phone]             Routing refill request to provider for review/approval because:  Drug not on the [facility], P or [facility] refill protocol or controlled substance

## 2024-06-07 NOTE — PROGRESS NOTES
Assessment & Plan     Attention deficit hyperactivity disorder (ADHD), predominantly inattentive type  Ritalin continues to work well for focus and energy   Continue current treatment     Bilateral low back pain without sciatica, unspecified chronicity  Anne Marie has history of intermittent back pain  She has self care and follows with chiropractor as needed  If she would like PT - she can let us know    Malignant neoplasm of lower-outer quadrant of right breast of female, estrogen receptor positive (H)  History of breast cancer - continues to follow up with oncology.  She did have bilateral breast removal   She feels she is doing well         The longitudinal plan of care for the diagnosis(es)/condition(s) as documented were addressed during this visit. Due to the added complexity in care, I will continue to support Anne Marie in the subsequent management and with ongoing continuity of care.      I spent a total of 17 minutes on the day of the visit.   Time spent by me doing chart review, history and exam, documentation and further activities per the note    See Patient Instructions    Return in about 6 months (around 12/10/2024) for adhd.    Subjective   Anne Marie is a 68 year old, presenting for the following health issues:  Recheck Medication        6/10/2024     9:00 AM   Additional Questions   Roomed by Hector Martinez   Accompanied by None         6/10/2024     9:00 AM   Patient Reported Additional Medications   Patient reports taking the following new medications Magnesium, B complex, D3-K2, and Vitamin C     HPI     DEXA - has not completed   Colon screening - has cologuard at home   Mammogram - history of breast cancer follows with oncology with bilateral mastectomy - will discuss with oncology     Medication Followup of Ritalin 10 mg  Taking Medication as prescribed: yes  Side Effects:  None  Medication Helping Symptoms:  yes      Back pain - chronic intermittent   Was recently -seen by chiropractor       Review of  Systems  CONSTITUTIONAL: NEGATIVE for fever, chills, change in weight  ENT/MOUTH: sinus congestion - seasonal allergies   RESP:slight cough   CV: NEGATIVE for chest pain, palpitations or peripheral edema  GI: NEGATIVE for nausea, abdominal pain, heartburn, or change in bowel habits  : denies dysuria   NEURO: some recent back pain  PSYCHIATRIC: NEGATIVE for changes in mood or affect      Objective    /70   Pulse 87   Temp 97.8  F (36.6  C) (Tympanic)   Resp 16   Wt 58.9 kg (129 lb 14.4 oz)   SpO2 99%   BMI 23.01 kg/m    Body mass index is 23.01 kg/m .  Physical Exam   GENERAL: alert and no distress  RESP: lungs clear to auscultation - no rales, rhonchi or wheezes  CV: regular rate and rhythm, normal S1 S2, no S3 or S4, no murmur, click or rub, no peripheral edema  ABDOMEN: soft, nontender, no hepatosplenomegaly, no masses and bowel sounds normal  PSYCH: mentation appears normal, affect normal/bright    Office Visit on 01/10/2024   Component Date Value Ref Range Status    Cholesterol 01/10/2024 290 (H)  <200 mg/dL Final    Triglycerides 01/10/2024 74  <150 mg/dL Final    Direct Measure HDL 01/10/2024 114  >=50 mg/dL Final    LDL Cholesterol Calculated 01/10/2024 161 (H)  <=100 mg/dL Final    Non HDL Cholesterol 01/10/2024 176 (H)  <130 mg/dL Final    Patient Fasting > 8hrs? 01/10/2024 Yes   Final    Sodium 01/10/2024 138  135 - 145 mmol/L Final    Reference intervals for this test were updated on 09/26/2023 to more accurately reflect our healthy population. There may be differences in the flagging of prior results with similar values performed with this method. Interpretation of those prior results can be made in the context of the updated reference intervals.     Potassium 01/10/2024 4.8  3.4 - 5.3 mmol/L Final    Carbon Dioxide (CO2) 01/10/2024 22  22 - 29 mmol/L Final    Anion Gap 01/10/2024 11  7 - 15 mmol/L Final    Urea Nitrogen 01/10/2024 23.9 (H)  8.0 - 23.0 mg/dL Final    Creatinine 01/10/2024  0.66  0.51 - 0.95 mg/dL Final    GFR Estimate 01/10/2024 >90  >60 mL/min/1.73m2 Final    Calcium 01/10/2024 9.4  8.8 - 10.2 mg/dL Final    Chloride 01/10/2024 105  98 - 107 mmol/L Final    Glucose 01/10/2024 98  70 - 99 mg/dL Final    Alkaline Phosphatase 01/10/2024 64  40 - 150 U/L Final    Reference intervals for this test were updated on 11/14/2023 to more accurately reflect our healthy population. There may be differences in the flagging of prior results with similar values performed with this method. Interpretation of those prior results can be made in the context of the updated reference intervals.    AST 01/10/2024 44  0 - 45 U/L Final    Reference intervals for this test were updated on 6/12/2023 to more accurately reflect our healthy population. There may be differences in the flagging of prior results with similar values performed with this method. Interpretation of those prior results can be made in the context of the updated reference intervals.    ALT 01/10/2024 64 (H)  0 - 50 U/L Final    Reference intervals for this test were updated on 6/12/2023 to more accurately reflect our healthy population. There may be differences in the flagging of prior results with similar values performed with this method. Interpretation of those prior results can be made in the context of the updated reference intervals.      Protein Total 01/10/2024 6.9  6.4 - 8.3 g/dL Final    Albumin 01/10/2024 4.2  3.5 - 5.2 g/dL Final    Bilirubin Total 01/10/2024 0.2  <=1.2 mg/dL Final    TSH 01/10/2024 3.34  0.30 - 4.20 uIU/mL Final           Signed Electronically by: DEEPA Jama CNP

## 2024-06-10 ENCOUNTER — OFFICE VISIT (OUTPATIENT)
Dept: FAMILY MEDICINE | Facility: OTHER | Age: 69
End: 2024-06-10
Attending: NURSE PRACTITIONER
Payer: COMMERCIAL

## 2024-06-10 VITALS
TEMPERATURE: 97.8 F | SYSTOLIC BLOOD PRESSURE: 118 MMHG | RESPIRATION RATE: 16 BRPM | OXYGEN SATURATION: 99 % | WEIGHT: 129.9 LBS | DIASTOLIC BLOOD PRESSURE: 70 MMHG | BODY MASS INDEX: 23.01 KG/M2 | HEART RATE: 87 BPM

## 2024-06-10 DIAGNOSIS — F90.0 ATTENTION DEFICIT HYPERACTIVITY DISORDER (ADHD), PREDOMINANTLY INATTENTIVE TYPE: Primary | ICD-10-CM

## 2024-06-10 DIAGNOSIS — Z17.0 MALIGNANT NEOPLASM OF LOWER-OUTER QUADRANT OF RIGHT BREAST OF FEMALE, ESTROGEN RECEPTOR POSITIVE (H): ICD-10-CM

## 2024-06-10 DIAGNOSIS — M54.50 BILATERAL LOW BACK PAIN WITHOUT SCIATICA, UNSPECIFIED CHRONICITY: ICD-10-CM

## 2024-06-10 DIAGNOSIS — C50.511 MALIGNANT NEOPLASM OF LOWER-OUTER QUADRANT OF RIGHT BREAST OF FEMALE, ESTROGEN RECEPTOR POSITIVE (H): ICD-10-CM

## 2024-06-10 PROBLEM — T45.1X5A CHEMOTHERAPY-INDUCED NEUTROPENIA (H): Status: RESOLVED | Noted: 2021-10-25 | Resolved: 2024-06-10

## 2024-06-10 PROBLEM — D70.1 CHEMOTHERAPY-INDUCED NEUTROPENIA (H): Status: RESOLVED | Noted: 2021-10-25 | Resolved: 2024-06-10

## 2024-06-10 PROCEDURE — G0463 HOSPITAL OUTPT CLINIC VISIT: HCPCS

## 2024-06-10 PROCEDURE — G2211 COMPLEX E/M VISIT ADD ON: HCPCS | Performed by: NURSE PRACTITIONER

## 2024-06-10 PROCEDURE — 99213 OFFICE O/P EST LOW 20 MIN: CPT | Performed by: NURSE PRACTITIONER

## 2024-06-10 ASSESSMENT — PAIN SCALES - GENERAL: PAINLEVEL: NO PAIN (1)

## 2024-06-12 DIAGNOSIS — F90.0 ATTENTION DEFICIT HYPERACTIVITY DISORDER (ADHD), PREDOMINANTLY INATTENTIVE TYPE: ICD-10-CM

## 2024-06-12 NOTE — TELEPHONE ENCOUNTER
Ritalin 10 mg       Last Written Prescription Date:  5/8/24  Last Fill Quantity: 90,   # refills: 0  Last Office Visit: 6/10/24  Future Office visit:       Routing refill request to provider for review/approval because:  Drug not on the [facility], P or [facility] refill protocol or controlled substance

## 2024-06-14 RX ORDER — METHYLPHENIDATE HYDROCHLORIDE 10 MG/1
10 TABLET ORAL 3 TIMES DAILY
Qty: 90 TABLET | Refills: 0 | Status: SHIPPED | OUTPATIENT
Start: 2024-06-14 | End: 2024-07-12

## 2024-07-11 DIAGNOSIS — F90.0 ATTENTION DEFICIT HYPERACTIVITY DISORDER (ADHD), PREDOMINANTLY INATTENTIVE TYPE: ICD-10-CM

## 2024-07-11 NOTE — TELEPHONE ENCOUNTER
Methylphenidate 10 mg       Last Written Prescription Date:  6/14/24  Last Fill Quantity: 90,   # refills: 0  Last Office Visit: 6/10/24  Future Office visit:       Routing refill request to provider for review/approval because:  Drug not on the [facility], P or [facility] refill protocol or controlled substance

## 2024-07-12 RX ORDER — METHYLPHENIDATE HYDROCHLORIDE 10 MG/1
10 TABLET ORAL 3 TIMES DAILY
Qty: 90 TABLET | Refills: 0 | Status: SHIPPED | OUTPATIENT
Start: 2024-07-12 | End: 2024-08-12

## 2024-08-12 DIAGNOSIS — F90.0 ATTENTION DEFICIT HYPERACTIVITY DISORDER (ADHD), PREDOMINANTLY INATTENTIVE TYPE: ICD-10-CM

## 2024-08-12 RX ORDER — METHYLPHENIDATE HYDROCHLORIDE 10 MG/1
10 TABLET ORAL 3 TIMES DAILY
Qty: 90 TABLET | Refills: 0 | Status: SHIPPED | OUTPATIENT
Start: 2024-08-12 | End: 2024-09-13

## 2024-08-12 NOTE — TELEPHONE ENCOUNTER
methylphenidate (RITALIN) 10 MG tablet       Last Written Prescription Date:  7/12/24  Last Fill Quantity: 90,   # refills: 0  Last Office Visit: 6/10/24  Future Office visit:       Routing refill request to provider for review/approval because:

## 2024-09-13 DIAGNOSIS — F90.0 ATTENTION DEFICIT HYPERACTIVITY DISORDER (ADHD), PREDOMINANTLY INATTENTIVE TYPE: ICD-10-CM

## 2024-09-13 RX ORDER — METHYLPHENIDATE HYDROCHLORIDE 10 MG/1
10 TABLET ORAL 3 TIMES DAILY
Qty: 90 TABLET | Refills: 0 | Status: SHIPPED | OUTPATIENT
Start: 2024-09-13

## 2024-09-13 NOTE — TELEPHONE ENCOUNTER
methylphenidate (RITALIN) 10 MG tablet       Last Written Prescription Date:  8/12/24  Last Fill Quantity: 90,   # refills: 0  Last Office Visit: 6/10/24  Future Office visit:       Routing refill request to provider for review/approval because:  Drug not on the [facility], P or [facility] refill protocol or controlled substance

## 2024-10-12 DIAGNOSIS — F90.0 ATTENTION DEFICIT HYPERACTIVITY DISORDER (ADHD), PREDOMINANTLY INATTENTIVE TYPE: ICD-10-CM

## 2024-10-14 RX ORDER — METHYLPHENIDATE HYDROCHLORIDE 10 MG/1
10 TABLET ORAL 3 TIMES DAILY
Qty: 90 TABLET | Refills: 0 | Status: SHIPPED | OUTPATIENT
Start: 2024-10-14

## 2024-10-14 NOTE — TELEPHONE ENCOUNTER
Ritalin 10 MG      Last Written Prescription Date:  09/13/24  Last Fill Quantity: 90,   # refills: 0  Last Office Visit: 06/10/24  Future Office visit:       Routing refill request to provider for review/approval because:  Drug not on the [facility], P or [facility] refill protocol or controlled substance

## 2024-11-16 DIAGNOSIS — F90.0 ATTENTION DEFICIT HYPERACTIVITY DISORDER (ADHD), PREDOMINANTLY INATTENTIVE TYPE: ICD-10-CM

## 2024-11-18 RX ORDER — METHYLPHENIDATE HYDROCHLORIDE 10 MG/1
10 TABLET ORAL 3 TIMES DAILY
Qty: 90 TABLET | Refills: 0 | Status: SHIPPED | OUTPATIENT
Start: 2024-11-18

## 2024-11-18 NOTE — TELEPHONE ENCOUNTER
Ritalin      Last Written Prescription Date:  10/14/24  Last Fill Quantity: 90,   # refills: 0  Last Office Visit: 6/10/24  Future Office visit:       Routing refill request to provider for review/approval because:

## 2024-11-20 NOTE — TELEPHONE ENCOUNTER
Attempt # 2  Outcome: Talked with Patient    Comment: Spoke with pt to schedule. Pt needs to look at her schedule and said she would call back today, 11/20 to Schedule the follow up.

## 2024-12-20 NOTE — PROGRESS NOTES
Assessment & Plan     Attention deficit hyperactivity disorder (ADHD), predominantly inattentive type  Current treatment for ADHD working well - ok to follow up in 6 months   Continue with Ritalin up to 3 times a day as needed  - Comprehensive metabolic panel (BMP + Alb, Alk Phos, ALT, AST, Total. Bili, TP); Future  - methylphenidate (RITALIN) 10 MG tablet; Take 1 tablet (10 mg) by mouth 2 times daily.  - methylphenidate (RITALIN) 10 MG tablet; Take 1 tablet (10 mg) by mouth 2 times daily.  - methylphenidate (RITALIN) 10 MG tablet; Take 1 tablet (10 mg) by mouth 2 times daily.    Lipid screening  HDL remains >100   Anne Marie eats very little to no processed foods, does not smoke or drinks alcohol  Try increasing fiber   - Lipid Profile (Chol, Trig, HDL, LDL calc); Future        Recurrent cold sores  Valtrex generic is not work would like to try an alternative or brand name.  Will try acyclovir and see if this work   - acyclovir (ZOVIRAX) 200 MG capsule; Take 1 capsule (200 mg) by mouth 5 times daily for 5 days.        The longitudinal plan of care for the diagnosis(es)/condition(s) as documented were addressed during this visit. Due to the added complexity in care, I will continue to support Anne Marie in the subsequent management and with ongoing continuity of care.    Ordering of each unique test  I spent a total of 21 minutes on the day of the visit.   Time spent by me today doing chart review, history and exam, documentation and further activities per the note      See Patient Instructions    No follow-ups on file.    Subjective   Anne Marie is a 69 year old, presenting for the following health issues:  Recheck Medication        12/23/2024     2:16 PM   Additional Questions   Roomed by coby maurer   Accompanied by self         12/23/2024     2:16 PM   Patient Reported Additional Medications   Patient reports taking the following new medications none     AGUS HOFFMAN wants to discuss it   Colon screening has the cologuard at  "home     Medication Followup of Ritalin 10 mg  Taking Medication as prescribed: yes  Side Effects:  None  Medication Helping Symptoms:  yes        Review of Systems  CONSTITUTIONAL: NEGATIVE for fever, chills, change in weight  INTEGUMENTARY/SKIN: intermittent cold sores   RESP: NEGATIVE for significant cough or SOB  CV: NEGATIVE for chest pain, palpitations or peripheral edema  PSYCHIATRIC: NEGATIVE for changes in mood or affect      Objective    /78 (BP Location: Left arm, Patient Position: Sitting, Cuff Size: Adult Regular)   Pulse 85   Temp 97.9  F (36.6  C) (Tympanic)   Ht 1.6 m (5' 3\")   Wt 58.1 kg (128 lb)   SpO2 99%   BMI 22.67 kg/m    Body mass index is 22.67 kg/m .  Physical Exam   GENERAL: alert and no distress  RESP: lungs clear to auscultation - no rales, rhonchi or wheezes  CV: regular rate and rhythm, normal S1 S2, no S3 or S4, no murmur, click or rub, no peripheral edema  ABDOMEN: soft, nontender, no hepatosplenomegaly, no masses and bowel sounds normal  PSYCH: mentation appears normal, affect normal/bright    Results for orders placed or performed in visit on 12/23/24   Comprehensive metabolic panel (BMP + Alb, Alk Phos, ALT, AST, Total. Bili, TP)     Status: None   Result Value Ref Range    Sodium 140 135 - 145 mmol/L    Potassium 4.9 3.4 - 5.3 mmol/L    Carbon Dioxide (CO2) 26 22 - 29 mmol/L    Anion Gap 10 7 - 15 mmol/L    Urea Nitrogen 15.9 8.0 - 23.0 mg/dL    Creatinine 0.69 0.51 - 0.95 mg/dL    GFR Estimate >90 >60 mL/min/1.73m2    Calcium 10.0 8.8 - 10.4 mg/dL    Chloride 104 98 - 107 mmol/L    Glucose 98 70 - 99 mg/dL    Alkaline Phosphatase 62 40 - 150 U/L    AST 33 0 - 45 U/L    ALT 31 0 - 50 U/L    Protein Total 7.4 6.4 - 8.3 g/dL    Albumin 4.4 3.5 - 5.2 g/dL    Bilirubin Total 0.5 <=1.2 mg/dL    Patient Fasting > 8hrs? No    Lipid Profile (Chol, Trig, HDL, LDL calc)     Status: Abnormal   Result Value Ref Range    Cholesterol 233 (H) <200 mg/dL    Triglycerides 41 <150 mg/dL "    Direct Measure  >=50 mg/dL    LDL Cholesterol Calculated 121 (H) <100 mg/dL    Non HDL Cholesterol 129 <130 mg/dL    Patient Fasting > 8hrs? No     Narrative    Cholesterol  Desirable: < 200 mg/dL  Borderline High: 200 - 239 mg/dL  High: >= 240 mg/dL    Triglycerides  Normal: < 150 mg/dL  Borderline High: 150 - 199 mg/dL  High: 200-499 mg/dL  Very High: >= 500 mg/dL    Direct Measure HDL  Female: >= 50 mg/dL   Male: >= 40 mg/dL    LDL Cholesterol  Desirable: < 100 mg/dL  Above Desirable: 100 - 129 mg/dL   Borderline High: 130 - 159 mg/dL   High:  160 - 189 mg/dL   Very High: >= 190 mg/dL    Non HDL Cholesterol  Desirable: < 130 mg/dL  Above Desirable: 130 - 159 mg/dL  Borderline High: 160 - 189 mg/dL  High: 190 - 219 mg/dL  Very High: >= 220 mg/dL           Signed Electronically by: DEEPA Jama CNP

## 2024-12-23 ENCOUNTER — LAB (OUTPATIENT)
Dept: LAB | Facility: OTHER | Age: 69
End: 2024-12-23
Attending: NURSE PRACTITIONER
Payer: COMMERCIAL

## 2024-12-23 ENCOUNTER — OFFICE VISIT (OUTPATIENT)
Dept: FAMILY MEDICINE | Facility: OTHER | Age: 69
End: 2024-12-23
Attending: NURSE PRACTITIONER
Payer: COMMERCIAL

## 2024-12-23 VITALS
BODY MASS INDEX: 22.68 KG/M2 | HEART RATE: 85 BPM | HEIGHT: 63 IN | WEIGHT: 128 LBS | OXYGEN SATURATION: 99 % | DIASTOLIC BLOOD PRESSURE: 78 MMHG | SYSTOLIC BLOOD PRESSURE: 116 MMHG | TEMPERATURE: 97.9 F

## 2024-12-23 DIAGNOSIS — Z13.220 LIPID SCREENING: ICD-10-CM

## 2024-12-23 DIAGNOSIS — F90.0 ATTENTION DEFICIT HYPERACTIVITY DISORDER (ADHD), PREDOMINANTLY INATTENTIVE TYPE: Primary | ICD-10-CM

## 2024-12-23 DIAGNOSIS — B00.1 RECURRENT COLD SORES: ICD-10-CM

## 2024-12-23 DIAGNOSIS — F90.0 ATTENTION DEFICIT HYPERACTIVITY DISORDER (ADHD), PREDOMINANTLY INATTENTIVE TYPE: ICD-10-CM

## 2024-12-23 LAB
ALBUMIN SERPL BCG-MCNC: 4.4 G/DL (ref 3.5–5.2)
ALP SERPL-CCNC: 62 U/L (ref 40–150)
ALT SERPL W P-5'-P-CCNC: 31 U/L (ref 0–50)
ANION GAP SERPL CALCULATED.3IONS-SCNC: 10 MMOL/L (ref 7–15)
AST SERPL W P-5'-P-CCNC: 33 U/L (ref 0–45)
BILIRUB SERPL-MCNC: 0.5 MG/DL
BUN SERPL-MCNC: 15.9 MG/DL (ref 8–23)
CALCIUM SERPL-MCNC: 10 MG/DL (ref 8.8–10.4)
CHLORIDE SERPL-SCNC: 104 MMOL/L (ref 98–107)
CHOLEST SERPL-MCNC: 233 MG/DL
CREAT SERPL-MCNC: 0.69 MG/DL (ref 0.51–0.95)
EGFRCR SERPLBLD CKD-EPI 2021: >90 ML/MIN/1.73M2
FASTING STATUS PATIENT QL REPORTED: NO
FASTING STATUS PATIENT QL REPORTED: NO
GLUCOSE SERPL-MCNC: 98 MG/DL (ref 70–99)
HCO3 SERPL-SCNC: 26 MMOL/L (ref 22–29)
HDLC SERPL-MCNC: 104 MG/DL
LDLC SERPL CALC-MCNC: 121 MG/DL
NONHDLC SERPL-MCNC: 129 MG/DL
POTASSIUM SERPL-SCNC: 4.9 MMOL/L (ref 3.4–5.3)
PROT SERPL-MCNC: 7.4 G/DL (ref 6.4–8.3)
SODIUM SERPL-SCNC: 140 MMOL/L (ref 135–145)
TRIGL SERPL-MCNC: 41 MG/DL

## 2024-12-23 PROCEDURE — 80061 LIPID PANEL: CPT | Mod: ZL

## 2024-12-23 PROCEDURE — G0463 HOSPITAL OUTPT CLINIC VISIT: HCPCS

## 2024-12-23 PROCEDURE — 82374 ASSAY BLOOD CARBON DIOXIDE: CPT | Mod: ZL

## 2024-12-23 PROCEDURE — 36415 COLL VENOUS BLD VENIPUNCTURE: CPT | Mod: ZL

## 2024-12-23 PROCEDURE — 82310 ASSAY OF CALCIUM: CPT | Mod: ZL

## 2024-12-23 RX ORDER — METHYLPHENIDATE HYDROCHLORIDE 10 MG/1
10 TABLET ORAL 2 TIMES DAILY
Qty: 60 TABLET | Refills: 0 | Status: SHIPPED | OUTPATIENT
Start: 2024-12-23 | End: 2025-01-22

## 2024-12-23 RX ORDER — VALACYCLOVIR HYDROCHLORIDE 500 MG/1
TABLET, FILM COATED ORAL
Status: CANCELLED | OUTPATIENT
Start: 2024-12-23

## 2024-12-23 RX ORDER — ACYCLOVIR 200 MG/1
200 CAPSULE ORAL
Qty: 25 CAPSULE | Refills: 0 | Status: SHIPPED | OUTPATIENT
Start: 2024-12-23 | End: 2024-12-28

## 2024-12-23 RX ORDER — METHYLPHENIDATE HYDROCHLORIDE 10 MG/1
10 TABLET ORAL 2 TIMES DAILY
Qty: 60 TABLET | Refills: 0 | Status: SHIPPED | OUTPATIENT
Start: 2025-02-23 | End: 2025-03-25

## 2024-12-23 RX ORDER — METHYLPHENIDATE HYDROCHLORIDE 10 MG/1
10 TABLET ORAL 2 TIMES DAILY
Qty: 60 TABLET | Refills: 0 | Status: SHIPPED | OUTPATIENT
Start: 2025-01-23 | End: 2025-02-22

## 2024-12-23 ASSESSMENT — PAIN SCALES - GENERAL: PAINLEVEL_OUTOF10: NO PAIN (0)

## 2025-01-10 ENCOUNTER — TELEPHONE (OUTPATIENT)
Dept: FAMILY MEDICINE | Facility: OTHER | Age: 70
End: 2025-01-10

## 2025-01-10 DIAGNOSIS — F90.0 ATTENTION DEFICIT HYPERACTIVITY DISORDER (ADHD), PREDOMINANTLY INATTENTIVE TYPE: ICD-10-CM

## 2025-01-10 RX ORDER — METHYLPHENIDATE HYDROCHLORIDE 10 MG/1
10 TABLET ORAL 3 TIMES DAILY
Qty: 90 TABLET | Refills: 0 | Status: SHIPPED | OUTPATIENT
Start: 2025-02-10 | End: 2025-03-12

## 2025-01-10 RX ORDER — METHYLPHENIDATE HYDROCHLORIDE 10 MG/1
10 TABLET ORAL 3 TIMES DAILY
Qty: 90 TABLET | Refills: 0 | Status: CANCELLED | OUTPATIENT
Start: 2025-01-10

## 2025-01-10 RX ORDER — METHYLPHENIDATE HYDROCHLORIDE 10 MG/1
10 TABLET ORAL 3 TIMES DAILY
Qty: 90 TABLET | Refills: 0 | Status: SHIPPED | OUTPATIENT
Start: 2025-03-13 | End: 2025-04-12

## 2025-01-10 RX ORDER — METHYLPHENIDATE HYDROCHLORIDE 10 MG/1
10 TABLET ORAL 3 TIMES DAILY
Qty: 90 TABLET | Refills: 0 | Status: SHIPPED | OUTPATIENT
Start: 2025-01-10 | End: 2025-02-09

## 2025-01-10 NOTE — TELEPHONE ENCOUNTER
12:38 PM    Reason for Call: Phone Call / Medication Question    Description: Pt called, states that her medication (methylphenidate (RITALIN) 10 MG tablet) bottle says to take 2 times a day but pt states that she has been prescribed to 3 times a day for years. She said there was no discussion of changing the dosage during last appointment. Pt states she's unsure how this mistake happened.   Pt states that she is leaving on vacation Wednesday, Please call patient back before trip.         Preferred method for responding to this message: Telephone Call  What is your phone number ? [phone]    If we cannot reach you directly, may we leave a detailed response at the number you provided? Yes    Can this message wait until your PCP/provider returns, if unavailable today? Not applicable    Ryann Velasquez

## 2025-03-09 ENCOUNTER — HEALTH MAINTENANCE LETTER (OUTPATIENT)
Age: 70
End: 2025-03-09

## 2025-04-15 DIAGNOSIS — F90.0 ATTENTION DEFICIT HYPERACTIVITY DISORDER (ADHD), PREDOMINANTLY INATTENTIVE TYPE: ICD-10-CM

## 2025-04-15 RX ORDER — METHYLPHENIDATE HYDROCHLORIDE 10 MG/1
10 TABLET ORAL 3 TIMES DAILY
Qty: 90 TABLET | Refills: 0 | Status: SHIPPED | OUTPATIENT
Start: 2025-04-15

## 2025-04-15 NOTE — TELEPHONE ENCOUNTER
Ritalin       Last Written Prescription Date:  11/18/2024  Last Fill Quantity: 90,   # refills: 0  Last Office Visit: 12/23/2024  Future Office visit:    Next 5 appointments (look out 90 days)      Jun 23, 2025 2:30 PM  (Arrive by 2:15 PM)  Provider Visit with DEEPA Cobb CNP  [facility] [place] ([facility] [place] ) [address]  [place] [address]  [phone]

## 2025-04-15 NOTE — TELEPHONE ENCOUNTER
methylphenidate (RITALIN) 10 MG tablet       Last Written Prescription Date:  3/18/25  Last Fill Quantity: 90,   # refills: 0  Last Office Visit: 12/23/24  Future Office visit:    Next 5 appointments (look out 90 days)      Jun 23, 2025 2:30 PM  (Arrive by 2:15 PM)  Provider Visit with DEEPA Cobb CNP  [facility] [place] ([facility] [place] ) [address] [place] [address] AVE  [place] [address]  [phone]                 Rx Protocol Controlled Substance Failed      Urine drug screeen results on file in past 12 months    [unfilled]     Controlled Substance Agreement on file in last 12 months    Please review last Controlled Substance Pain agreement document.   CSA -- Encounter Level:    CSA: None found at the encounter level.      CSA -- Patient Level:    CSA: None found at the patient level.          Auto Fail - Please forward to Provider

## 2025-05-12 DIAGNOSIS — F90.0 ATTENTION DEFICIT HYPERACTIVITY DISORDER (ADHD), PREDOMINANTLY INATTENTIVE TYPE: ICD-10-CM

## 2025-05-12 RX ORDER — METHYLPHENIDATE HYDROCHLORIDE 10 MG/1
10 TABLET ORAL 3 TIMES DAILY
Qty: 90 TABLET | Refills: 0 | Status: SHIPPED | OUTPATIENT
Start: 2025-05-12

## 2025-05-12 NOTE — TELEPHONE ENCOUNTER
methylphenidate (RITALIN) 10 MG tablet       Last Written Prescription Date:  4/15/2025  Last Fill Quantity: 90,   # refills: 0  Last Office Visit: 12/23/2024  Future Office visit:    Next 5 appointments (look out 90 days)      Jun 23, 2025 2:30 PM  (Arrive by 2:15 PM)  Provider Visit with DEEPA Cobb CNP  [facility] [place] ([facility] [place] ) [address] [place] [address] AVE  [place] [address]  [phone]             Routing refill request to provider for review/approval because:  Drug not on the [facility], P or  Health refill protocol or controlled substance

## 2025-06-11 DIAGNOSIS — F90.0 ATTENTION DEFICIT HYPERACTIVITY DISORDER (ADHD), PREDOMINANTLY INATTENTIVE TYPE: ICD-10-CM

## 2025-06-11 RX ORDER — METHYLPHENIDATE HYDROCHLORIDE 10 MG/1
10 TABLET ORAL 3 TIMES DAILY
Qty: 90 TABLET | Refills: 0 | Status: SHIPPED | OUTPATIENT
Start: 2025-06-11

## 2025-06-11 NOTE — TELEPHONE ENCOUNTER
Ritalin 10 MG      Last Written Prescription Date:  05/12/25  Last Fill Quantity: 90,   # refills: 0  Last Office Visit: 12/23/24  Future Office visit:    Next 5 appointments (look out 90 days)      Jun 23, 2025 2:30 PM  (Arrive by 2:15 PM)  Provider Visit with DEEPA Cobb CNP  [facility] [place] ([facility] [place] ) [address]  [place] [address]  [phone]             Routing refill request to provider for review/approval because:  Drug not on the [facility], P or  Health refill protocol or controlled substance

## 2025-07-15 DIAGNOSIS — F90.0 ATTENTION DEFICIT HYPERACTIVITY DISORDER (ADHD), PREDOMINANTLY INATTENTIVE TYPE: ICD-10-CM

## 2025-07-15 RX ORDER — METHYLPHENIDATE HYDROCHLORIDE 10 MG/1
10 TABLET ORAL 3 TIMES DAILY
Qty: 90 TABLET | Refills: 0 | Status: SHIPPED | OUTPATIENT
Start: 2025-07-15

## 2025-07-15 NOTE — TELEPHONE ENCOUNTER
Ritalin  Last Written Prescription Date: 6/11/25  Last Fill Quantity: 90 # of Refills: 0  Last Office Visit: 12/23/24

## 2025-07-21 NOTE — PROGRESS NOTES
Assessment & Plan     Attention deficit hyperactivity disorder (ADHD), predominantly inattentive type  Ritalin continue to help Anne Marie focus and complete tasks   Continue with Ritalin as prescribed 2-3 times a day as needed  Follow up in 5-6  months.  Has used current treatment for years     Malignant neoplasm of lower-outer quadrant of right breast of female, estrogen receptor positive (H)  No current treatment   Continues to follow up with oncology at [facility] about every 6 months     Recurrent cold sores  Requesting refill of valtrex to have available if having cold sore   Medication refilled to use as needed   - valACYclovir (VALTREX) 1000 mg tablet; Take 2 grams and repeat in 12 hours for cold sore break out    The longitudinal plan of care for the diagnosis(es)/condition(s) as documented were addressed during this visit. Due to the added complexity in care, I will continue to support Anne Marie in the subsequent management and with ongoing continuity of care.    Follow-up   Return in about 6 months (around 1/22/2026) for ADHD.        Continues to follow up with oncology at [facility] for breast cancer history     Cori Kenney is a 69 year old, presenting for the following health issues:  A.D.H.D    History of Present Illness       Reason for visit:  Medication   She is taking medications regularly.        DEXA declined   Colon screening declined     Medication Followup of Ritalin 10 MG   2-3 times a day as needed   Taking Medication as prescribed: yes  Side Effects:  None  Medication Helping Symptoms:  yes  Helps with focus       Review of Systems  CONSTITUTIONAL: NEGATIVE for fever, chills, change in weight  RESP: NEGATIVE for significant cough or SOB  CV: NEGATIVE for chest pain, palpitations or peripheral edema  GI: NEGATIVE for nausea, abdominal pain, heartburn, or change in bowel habits  : NEGATIVE for dysuria, frequency, or urgency  PSYCHIATRIC: NEGATIVE for changes in mood or affect      Objective   "  /78 (BP Location: Left arm, Patient Position: Sitting, Cuff Size: Adult Regular)   Pulse 73   Temp 97  F (36.1  C) (Tympanic)   Resp 16   Ht 1.6 m (5' 3\")   Wt 57.9 kg (127 lb 11.2 oz)   SpO2 98%   BMI 22.62 kg/m    Body mass index is 22.62 kg/m .  Physical Exam   GENERAL: alert and no distress  RESP: lungs clear to auscultation - no rales, rhonchi or wheezes  CV: regular rate and rhythm, normal S1 S2, no S3 or S4, no murmur, click or rub, no peripheral edema  PSYCH: mentation appears normal, affect normal/bright    Reviewed in Care Everywhere         Signed Electronically by: DEEPA Jama CNP    "

## 2025-07-22 ENCOUNTER — OFFICE VISIT (OUTPATIENT)
Dept: FAMILY MEDICINE | Facility: OTHER | Age: 70
End: 2025-07-22
Attending: NURSE PRACTITIONER
Payer: COMMERCIAL

## 2025-07-22 VITALS
HEART RATE: 73 BPM | HEIGHT: 63 IN | RESPIRATION RATE: 16 BRPM | SYSTOLIC BLOOD PRESSURE: 124 MMHG | BODY MASS INDEX: 22.62 KG/M2 | WEIGHT: 127.7 LBS | TEMPERATURE: 97 F | OXYGEN SATURATION: 98 % | DIASTOLIC BLOOD PRESSURE: 78 MMHG

## 2025-07-22 DIAGNOSIS — Z17.0 MALIGNANT NEOPLASM OF LOWER-OUTER QUADRANT OF RIGHT BREAST OF FEMALE, ESTROGEN RECEPTOR POSITIVE (H): ICD-10-CM

## 2025-07-22 DIAGNOSIS — C50.511 MALIGNANT NEOPLASM OF LOWER-OUTER QUADRANT OF RIGHT BREAST OF FEMALE, ESTROGEN RECEPTOR POSITIVE (H): ICD-10-CM

## 2025-07-22 DIAGNOSIS — F90.0 ATTENTION DEFICIT HYPERACTIVITY DISORDER (ADHD), PREDOMINANTLY INATTENTIVE TYPE: Primary | ICD-10-CM

## 2025-07-22 DIAGNOSIS — B00.1 RECURRENT COLD SORES: ICD-10-CM

## 2025-07-22 PROCEDURE — G0463 HOSPITAL OUTPT CLINIC VISIT: HCPCS

## 2025-07-22 RX ORDER — VALACYCLOVIR HYDROCHLORIDE 1 G/1
TABLET, FILM COATED ORAL
Qty: 12 TABLET | Refills: 3 | Status: SHIPPED | OUTPATIENT
Start: 2025-07-22

## 2025-07-22 ASSESSMENT — PAIN SCALES - GENERAL: PAINLEVEL_OUTOF10: NO PAIN (0)

## 2025-07-22 NOTE — PATIENT INSTRUCTIONS
LIFESTYLE CHANGES  Mediterranean style eating/plant based diet     Increase fiber intake   Protein goal (weight/2.2)  X  0.8-1.2     Eat a Healthy diet  Eat more vegetables/plants in your diet (1/2 your food should be vegetables)  Eat health fats  [place] oil  avocados  Eat healthy proteins  Poultry without the skin  Fish  Limit red meat  Nuts - limit to 1/4 cup per day   Increase physical activity  Slowly work up to 30 minutes of physical activity most days of the week  Aerobic activity 150 minute a week  2 days of resistance exercising   Move every 30-60 minutes         Try daily yoga and meditation and relaxation breathing

## (undated) DEVICE — APPLICATOR-CHLORAPREP 26ML TINTED CHG 2%+ 70% IPA-SURGICAL

## (undated) DEVICE — LIGHT HANDLE COVER FOR SKYTRON LIGHTS

## (undated) DEVICE — SCD SLEEVE-KNEE REG.

## (undated) DEVICE — COVER-IVAS TRANSDUCER SLEEVE (6X58')

## (undated) DEVICE — SUTURE-VICRYL 3-0 SH J416H

## (undated) DEVICE — SYRINGE-10CC SLIP TIP

## (undated) DEVICE — SUTURE-MONOCRYL 4-0 PS-2 Y496G

## (undated) DEVICE — CAUTERY PAD-POLYHESIVE II ADULT

## (undated) DEVICE — CANISTER-SUCTION 2000CC

## (undated) DEVICE — PACK-BASIN SET-UP

## (undated) DEVICE — DRAPE-C-ARM

## (undated) DEVICE — LABEL-STERILE PREPRINTED FOR OR

## (undated) DEVICE — BLADE-SCALPEL #15

## (undated) DEVICE — SUTURE-PROLENE 3-0 SH DOUBLE ARMED 8534H

## (undated) DEVICE — IRRIGATION-NACL 1000ML

## (undated) DEVICE — IRRIGATION-H2O 1000ML

## (undated) DEVICE — TOPICAL SKIN ADHESIVE EXOFIN

## (undated) DEVICE — SUTURE BOOTS-STANDARD

## (undated) DEVICE — GLV-7.5 PROTEXIS PI CLASSIC LF/PF

## (undated) DEVICE — PACK-LAPAROTOMY-CUSTOM

## (undated) DEVICE — BAG-DECANTER

## (undated) RX ORDER — LIDOCAINE HYDROCHLORIDE 20 MG/ML
INJECTION, SOLUTION EPIDURAL; INFILTRATION; INTRACAUDAL; PERINEURAL
Status: DISPENSED
Start: 2021-10-19

## (undated) RX ORDER — ONDANSETRON 2 MG/ML
INJECTION INTRAMUSCULAR; INTRAVENOUS
Status: DISPENSED
Start: 2021-10-19